# Patient Record
Sex: MALE | Race: WHITE | Employment: OTHER | URBAN - METROPOLITAN AREA
[De-identification: names, ages, dates, MRNs, and addresses within clinical notes are randomized per-mention and may not be internally consistent; named-entity substitution may affect disease eponyms.]

---

## 2023-08-16 ENCOUNTER — APPOINTMENT (OUTPATIENT)
Dept: GENERAL RADIOLOGY | Facility: HOSPITAL | Age: 73
End: 2023-08-16
Attending: EMERGENCY MEDICINE
Payer: MEDICARE

## 2023-08-16 ENCOUNTER — HOSPITAL ENCOUNTER (INPATIENT)
Facility: HOSPITAL | Age: 73
LOS: 9 days | Discharge: NURSING, REHAB OR LONG-TERM CARE | End: 2023-08-26
Attending: EMERGENCY MEDICINE | Admitting: INTERNAL MEDICINE
Payer: MEDICARE

## 2023-08-16 ENCOUNTER — APPOINTMENT (OUTPATIENT)
Dept: MRI IMAGING | Facility: HOSPITAL | Age: 73
End: 2023-08-16
Attending: EMERGENCY MEDICINE
Payer: MEDICARE

## 2023-08-16 DIAGNOSIS — Z98.890 S/P CRANIOTOMY: ICD-10-CM

## 2023-08-16 DIAGNOSIS — I62.03 CHRONIC SUBDURAL HEMATOMA (HCC): Primary | ICD-10-CM

## 2023-08-16 LAB
BASOPHILS # BLD AUTO: 0.04 X10(3) UL (ref 0–0.2)
BASOPHILS NFR BLD AUTO: 0.7 %
DEPRECATED RDW RBC AUTO: 55.2 FL (ref 35.1–46.3)
EOSINOPHIL # BLD AUTO: 0.28 X10(3) UL (ref 0–0.7)
EOSINOPHIL NFR BLD AUTO: 4.6 %
ERYTHROCYTE [DISTWIDTH] IN BLOOD BY AUTOMATED COUNT: 16 % (ref 11–15)
HCT VFR BLD AUTO: 27.5 %
HGB BLD-MCNC: 8.8 G/DL
IMM GRANULOCYTES # BLD AUTO: 0.06 X10(3) UL (ref 0–1)
IMM GRANULOCYTES NFR BLD: 1 %
LYMPHOCYTES # BLD AUTO: 1.79 X10(3) UL (ref 1–4)
LYMPHOCYTES NFR BLD AUTO: 29.2 %
MCH RBC QN AUTO: 30.6 PG (ref 26–34)
MCHC RBC AUTO-ENTMCNC: 32 G/DL (ref 31–37)
MCV RBC AUTO: 95.5 FL
MONOCYTES # BLD AUTO: 0.49 X10(3) UL (ref 0.1–1)
MONOCYTES NFR BLD AUTO: 8 %
NEUTROPHILS # BLD AUTO: 3.47 X10 (3) UL (ref 1.5–7.7)
NEUTROPHILS # BLD AUTO: 3.47 X10(3) UL (ref 1.5–7.7)
NEUTROPHILS NFR BLD AUTO: 56.5 %
PLATELET # BLD AUTO: 261 10(3)UL (ref 150–450)
RBC # BLD AUTO: 2.88 X10(6)UL
WBC # BLD AUTO: 6.1 X10(3) UL (ref 4–11)

## 2023-08-16 PROCEDURE — 71045 X-RAY EXAM CHEST 1 VIEW: CPT | Performed by: EMERGENCY MEDICINE

## 2023-08-16 PROCEDURE — 70551 MRI BRAIN STEM W/O DYE: CPT | Performed by: EMERGENCY MEDICINE

## 2023-08-17 ENCOUNTER — ANESTHESIA (OUTPATIENT)
Dept: SURGERY | Facility: HOSPITAL | Age: 73
End: 2023-08-17
Payer: MEDICARE

## 2023-08-17 ENCOUNTER — ANESTHESIA EVENT (OUTPATIENT)
Dept: SURGERY | Facility: HOSPITAL | Age: 73
End: 2023-08-17
Payer: MEDICARE

## 2023-08-17 ENCOUNTER — APPOINTMENT (OUTPATIENT)
Dept: CT IMAGING | Facility: HOSPITAL | Age: 73
End: 2023-08-17
Attending: EMERGENCY MEDICINE
Payer: MEDICARE

## 2023-08-17 PROBLEM — N18.4 ANEMIA IN STAGE 4 CHRONIC KIDNEY DISEASE  (HCC): Status: ACTIVE | Noted: 2023-08-17

## 2023-08-17 PROBLEM — I62.03 CHRONIC SUBDURAL HEMATOMA (HCC): Status: ACTIVE | Noted: 2023-08-17

## 2023-08-17 PROBLEM — D63.1 ANEMIA IN STAGE 4 CHRONIC KIDNEY DISEASE: Status: ACTIVE | Noted: 2023-08-17

## 2023-08-17 PROBLEM — N18.4 CKD (CHRONIC KIDNEY DISEASE) STAGE 4, GFR 15-29 ML/MIN (HCC): Status: ACTIVE | Noted: 2023-08-17

## 2023-08-17 PROBLEM — N18.4 ANEMIA IN STAGE 4 CHRONIC KIDNEY DISEASE (HCC): Status: ACTIVE | Noted: 2023-08-17

## 2023-08-17 PROBLEM — D63.1 ANEMIA IN STAGE 4 CHRONIC KIDNEY DISEASE  (HCC): Status: ACTIVE | Noted: 2023-08-17

## 2023-08-17 PROBLEM — N18.4 ANEMIA IN STAGE 4 CHRONIC KIDNEY DISEASE: Status: ACTIVE | Noted: 2023-08-17

## 2023-08-17 PROBLEM — D63.1 ANEMIA IN STAGE 4 CHRONIC KIDNEY DISEASE (HCC): Status: ACTIVE | Noted: 2023-08-17

## 2023-08-17 LAB
ALBUMIN SERPL-MCNC: 3 G/DL (ref 3.4–5)
ALP LIVER SERPL-CCNC: 85 U/L
ALT SERPL-CCNC: 24 U/L
AMMONIA PLAS-MCNC: 47 UMOL/L (ref 11–32)
ANION GAP SERPL CALC-SCNC: 10 MMOL/L (ref 0–18)
ANION GAP SERPL CALC-SCNC: 6 MMOL/L (ref 0–18)
ANTIBODY SCREEN: NEGATIVE
APTT PPP: 29.9 SECONDS (ref 23.3–35.6)
AST SERPL-CCNC: 29 U/L (ref 15–37)
ATRIAL RATE: 71 BPM
BASE EXCESS BLD CALC-SCNC: -0.7 MMOL/L (ref ?–2)
BASOPHILS # BLD AUTO: 0.05 X10(3) UL (ref 0–0.2)
BASOPHILS NFR BLD AUTO: 1 %
BILIRUB DIRECT SERPL-MCNC: <0.1 MG/DL (ref 0–0.2)
BILIRUB SERPL-MCNC: 0.5 MG/DL (ref 0.1–2)
BUN BLD-MCNC: 53 MG/DL (ref 7–18)
BUN BLD-MCNC: 53 MG/DL (ref 7–18)
BUN/CREAT SERPL: 15.5 (ref 10–20)
BUN/CREAT SERPL: 16.7 (ref 10–20)
CA-I BLD-SCNC: 1.08 MMOL/L (ref 0.95–1.32)
CALCIUM BLD-MCNC: 7.7 MG/DL (ref 8.5–10.1)
CALCIUM BLD-MCNC: 8.2 MG/DL (ref 8.5–10.1)
CHLORIDE SERPL-SCNC: 108 MMOL/L (ref 98–112)
CHLORIDE SERPL-SCNC: 113 MMOL/L (ref 98–112)
CO2 SERPL-SCNC: 23 MMOL/L (ref 21–32)
CO2 SERPL-SCNC: 26 MMOL/L (ref 21–32)
CREAT BLD-MCNC: 3.17 MG/DL
CREAT BLD-MCNC: 3.42 MG/DL
DEPRECATED RDW RBC AUTO: 55.1 FL (ref 35.1–46.3)
EGFRCR SERPLBLD CKD-EPI 2021: 18 ML/MIN/1.73M2 (ref 60–?)
EGFRCR SERPLBLD CKD-EPI 2021: 20 ML/MIN/1.73M2 (ref 60–?)
EOSINOPHIL # BLD AUTO: 0.29 X10(3) UL (ref 0–0.7)
EOSINOPHIL NFR BLD AUTO: 5.8 %
ERYTHROCYTE [DISTWIDTH] IN BLOOD BY AUTOMATED COUNT: 15.8 % (ref 11–15)
EST. AVERAGE GLUCOSE BLD GHB EST-MCNC: 166 MG/DL (ref 68–126)
GLUCOSE BLD-MCNC: 127 MG/DL (ref 70–99)
GLUCOSE BLD-MCNC: 241 MG/DL (ref 70–99)
GLUCOSE BLDC GLUCOMTR-MCNC: 100 MG/DL (ref 70–99)
GLUCOSE BLDC GLUCOMTR-MCNC: 122 MG/DL (ref 70–99)
GLUCOSE BLDC GLUCOMTR-MCNC: 124 MG/DL (ref 70–99)
GLUCOSE BLDC GLUCOMTR-MCNC: 159 MG/DL (ref 70–99)
GLUCOSE BLDC GLUCOMTR-MCNC: 217 MG/DL (ref 70–99)
GLUCOSE BLDC GLUCOMTR-MCNC: 99 MG/DL (ref 70–99)
HBA1C MFR BLD: 7.4 % (ref ?–5.7)
HCO3 BLDA-SCNC: 24.4 MEQ/L (ref 21–27)
HCT VFR BLD AUTO: 23.6 %
HGB BLD-MCNC: 6.8 G/DL
HGB BLD-MCNC: 7.5 G/DL
IMM GRANULOCYTES # BLD AUTO: 0.04 X10(3) UL (ref 0–1)
IMM GRANULOCYTES NFR BLD: 0.8 %
INR BLD: 0.99 (ref 0.85–1.16)
IRON SATN MFR SERPL: 19 %
IRON SERPL-MCNC: 43 UG/DL
LACTATE BLD-SCNC: 0.9 MMOL/L (ref 0.5–2)
LYMPHOCYTES # BLD AUTO: 1.65 X10(3) UL (ref 1–4)
LYMPHOCYTES NFR BLD AUTO: 33.1 %
MAGNESIUM SERPL-MCNC: 2.1 MG/DL (ref 1.6–2.6)
MCH RBC QN AUTO: 30.7 PG (ref 26–34)
MCHC RBC AUTO-ENTMCNC: 31.8 G/DL (ref 31–37)
MCV RBC AUTO: 96.7 FL
MONOCYTES # BLD AUTO: 0.51 X10(3) UL (ref 0.1–1)
MONOCYTES NFR BLD AUTO: 10.2 %
NEUTROPHILS # BLD AUTO: 2.45 X10 (3) UL (ref 1.5–7.7)
NEUTROPHILS # BLD AUTO: 2.45 X10(3) UL (ref 1.5–7.7)
NEUTROPHILS NFR BLD AUTO: 49.1 %
NT-PROBNP SERPL-MCNC: 1606 PG/ML (ref ?–125)
OSMOLALITY SERPL CALC.SUM OF ELEC: 314 MOSM/KG (ref 275–295)
OSMOLALITY SERPL CALC.SUM OF ELEC: 316 MOSM/KG (ref 275–295)
P AXIS: 62 DEGREES
P-R INTERVAL: 178 MS
PCO2 BLDA: 40 MM HG (ref 35–45)
PH BLDA: 7.39 [PH] (ref 7.35–7.45)
PLATELET # BLD AUTO: 214 10(3)UL (ref 150–450)
PO2 BLDA: 248 MM HG (ref 80–100)
POTASSIUM BLD-SCNC: 3.8 MMOL/L (ref 3.6–5.1)
POTASSIUM SERPL-SCNC: 3.8 MMOL/L (ref 3.5–5.1)
POTASSIUM SERPL-SCNC: 4.6 MMOL/L (ref 3.5–5.1)
PROT SERPL-MCNC: 7.3 G/DL (ref 6.4–8.2)
PROTHROMBIN TIME: 13 SECONDS (ref 11.6–14.8)
PUNCTURE CHARGE: NO
Q-T INTERVAL: 404 MS
QRS DURATION: 86 MS
QTC CALCULATION (BEZET): 439 MS
R AXIS: 8 DEGREES
RBC # BLD AUTO: 2.44 X10(6)UL
RH BLOOD TYPE: POSITIVE
RH BLOOD TYPE: POSITIVE
SODIUM BLD-SCNC: 140 MMOL/L (ref 135–145)
SODIUM SERPL-SCNC: 141 MMOL/L (ref 136–145)
SODIUM SERPL-SCNC: 145 MMOL/L (ref 136–145)
T AXIS: 36 DEGREES
TIBC SERPL-MCNC: 225 UG/DL (ref 240–450)
TRANSFERRIN SERPL-MCNC: 151 MG/DL (ref 200–360)
TROPONIN I HIGH SENSITIVITY: 32 NG/L
VALPROATE SERPL-MCNC: 85.6 UG/ML (ref 50–100)
VENTRICULAR RATE: 71 BPM
WBC # BLD AUTO: 5 X10(3) UL (ref 4–11)

## 2023-08-17 PROCEDURE — 36410 VNPNXR 3YR/> PHY/QHP DX/THER: CPT | Performed by: NURSE PRACTITIONER

## 2023-08-17 PROCEDURE — 76937 US GUIDE VASCULAR ACCESS: CPT | Performed by: NURSE PRACTITIONER

## 2023-08-17 PROCEDURE — 009400Z DRAINAGE OF INTRACRANIAL SUBDURAL SPACE WITH DRAINAGE DEVICE, OPEN APPROACH: ICD-10-PCS | Performed by: NEUROLOGICAL SURGERY

## 2023-08-17 PROCEDURE — 70450 CT HEAD/BRAIN W/O DYE: CPT | Performed by: EMERGENCY MEDICINE

## 2023-08-17 PROCEDURE — 99222 1ST HOSP IP/OBS MODERATE 55: CPT | Performed by: STUDENT IN AN ORGANIZED HEALTH CARE EDUCATION/TRAINING PROGRAM

## 2023-08-17 PROCEDURE — 99222 1ST HOSP IP/OBS MODERATE 55: CPT | Performed by: INTERNAL MEDICINE

## 2023-08-17 PROCEDURE — 36430 TRANSFUSION BLD/BLD COMPNT: CPT | Performed by: ANESTHESIOLOGY

## 2023-08-17 PROCEDURE — 99223 1ST HOSP IP/OBS HIGH 75: CPT | Performed by: INTERNAL MEDICINE

## 2023-08-17 PROCEDURE — 76942 ECHO GUIDE FOR BIOPSY: CPT | Performed by: ANESTHESIOLOGY

## 2023-08-17 RX ORDER — HYDROMORPHONE HYDROCHLORIDE 1 MG/ML
0.6 INJECTION, SOLUTION INTRAMUSCULAR; INTRAVENOUS; SUBCUTANEOUS EVERY 5 MIN PRN
Status: ACTIVE | OUTPATIENT
Start: 2023-08-17 | End: 2023-08-17

## 2023-08-17 RX ORDER — BISACODYL 10 MG
10 SUPPOSITORY, RECTAL RECTAL
Status: DISCONTINUED | OUTPATIENT
Start: 2023-08-17 | End: 2023-08-17 | Stop reason: SDUPTHER

## 2023-08-17 RX ORDER — LIDOCAINE HYDROCHLORIDE 10 MG/ML
INJECTION, SOLUTION EPIDURAL; INFILTRATION; INTRACAUDAL; PERINEURAL AS NEEDED
Status: DISCONTINUED | OUTPATIENT
Start: 2023-08-17 | End: 2023-08-17 | Stop reason: SURG

## 2023-08-17 RX ORDER — SODIUM CHLORIDE, SODIUM LACTATE, POTASSIUM CHLORIDE, CALCIUM CHLORIDE 600; 310; 30; 20 MG/100ML; MG/100ML; MG/100ML; MG/100ML
INJECTION, SOLUTION INTRAVENOUS CONTINUOUS PRN
Status: DISCONTINUED | OUTPATIENT
Start: 2023-08-17 | End: 2023-08-17 | Stop reason: SURG

## 2023-08-17 RX ORDER — ONDANSETRON 2 MG/ML
INJECTION INTRAMUSCULAR; INTRAVENOUS AS NEEDED
Status: DISCONTINUED | OUTPATIENT
Start: 2023-08-17 | End: 2023-08-17 | Stop reason: SURG

## 2023-08-17 RX ORDER — OXYCODONE HYDROCHLORIDE 5 MG/1
2.5 TABLET ORAL EVERY 4 HOURS PRN
Status: DISCONTINUED | OUTPATIENT
Start: 2023-08-17 | End: 2023-08-26

## 2023-08-17 RX ORDER — PHENYLEPHRINE HCL 10 MG/ML
VIAL (ML) INJECTION AS NEEDED
Status: DISCONTINUED | OUTPATIENT
Start: 2023-08-17 | End: 2023-08-17 | Stop reason: SURG

## 2023-08-17 RX ORDER — SENNOSIDES 8.6 MG
17.2 TABLET ORAL NIGHTLY PRN
Status: DISCONTINUED | OUTPATIENT
Start: 2023-08-17 | End: 2023-08-17

## 2023-08-17 RX ORDER — MORPHINE SULFATE 4 MG/ML
4 INJECTION, SOLUTION INTRAMUSCULAR; INTRAVENOUS EVERY 10 MIN PRN
Status: ACTIVE | OUTPATIENT
Start: 2023-08-17 | End: 2023-08-17

## 2023-08-17 RX ORDER — SODIUM CHLORIDE 9 MG/ML
INJECTION, SOLUTION INTRAVENOUS CONTINUOUS PRN
Status: DISCONTINUED | OUTPATIENT
Start: 2023-08-17 | End: 2023-08-17 | Stop reason: SURG

## 2023-08-17 RX ORDER — ONDANSETRON 2 MG/ML
4 INJECTION INTRAMUSCULAR; INTRAVENOUS EVERY 6 HOURS PRN
Status: DISCONTINUED | OUTPATIENT
Start: 2023-08-17 | End: 2023-08-26

## 2023-08-17 RX ORDER — DEXTROSE MONOHYDRATE 25 G/50ML
50 INJECTION, SOLUTION INTRAVENOUS
Status: DISCONTINUED | OUTPATIENT
Start: 2023-08-17 | End: 2023-08-26

## 2023-08-17 RX ORDER — BISACODYL 10 MG
10 SUPPOSITORY, RECTAL RECTAL
Status: DISCONTINUED | OUTPATIENT
Start: 2023-08-17 | End: 2023-08-26

## 2023-08-17 RX ORDER — NALOXONE HYDROCHLORIDE 0.4 MG/ML
80 INJECTION, SOLUTION INTRAMUSCULAR; INTRAVENOUS; SUBCUTANEOUS AS NEEDED
Status: DISCONTINUED | OUTPATIENT
Start: 2023-08-17 | End: 2023-08-18

## 2023-08-17 RX ORDER — HYDROMORPHONE HYDROCHLORIDE 1 MG/ML
0.4 INJECTION, SOLUTION INTRAMUSCULAR; INTRAVENOUS; SUBCUTANEOUS EVERY 5 MIN PRN
Status: DISCONTINUED | OUTPATIENT
Start: 2023-08-17 | End: 2023-08-23 | Stop reason: ALTCHOICE

## 2023-08-17 RX ORDER — PROCHLORPERAZINE EDISYLATE 5 MG/ML
5 INJECTION INTRAMUSCULAR; INTRAVENOUS ONCE AS NEEDED
Status: ACTIVE | OUTPATIENT
Start: 2023-08-17 | End: 2023-08-17

## 2023-08-17 RX ORDER — INSULIN GLARGINE 100 [IU]/ML
INJECTION, SOLUTION SUBCUTANEOUS NIGHTLY
COMMUNITY
End: 2023-08-26

## 2023-08-17 RX ORDER — HYDROMORPHONE HYDROCHLORIDE 1 MG/ML
0.4 INJECTION, SOLUTION INTRAMUSCULAR; INTRAVENOUS; SUBCUTANEOUS EVERY 2 HOUR PRN
Status: DISCONTINUED | OUTPATIENT
Start: 2023-08-17 | End: 2023-08-26

## 2023-08-17 RX ORDER — POLYETHYLENE GLYCOL 3350 17 G/17G
17 POWDER, FOR SOLUTION ORAL DAILY PRN
Status: DISCONTINUED | OUTPATIENT
Start: 2023-08-17 | End: 2023-08-23 | Stop reason: ALTCHOICE

## 2023-08-17 RX ORDER — SODIUM CHLORIDE, SODIUM LACTATE, POTASSIUM CHLORIDE, CALCIUM CHLORIDE 600; 310; 30; 20 MG/100ML; MG/100ML; MG/100ML; MG/100ML
INJECTION, SOLUTION INTRAVENOUS CONTINUOUS
Status: DISCONTINUED | OUTPATIENT
Start: 2023-08-17 | End: 2023-08-18

## 2023-08-17 RX ORDER — HALOPERIDOL 5 MG/ML
0.25 INJECTION INTRAMUSCULAR ONCE AS NEEDED
Status: ACTIVE | OUTPATIENT
Start: 2023-08-17 | End: 2023-08-17

## 2023-08-17 RX ORDER — ROCURONIUM BROMIDE 10 MG/ML
INJECTION, SOLUTION INTRAVENOUS AS NEEDED
Status: DISCONTINUED | OUTPATIENT
Start: 2023-08-17 | End: 2023-08-17 | Stop reason: SURG

## 2023-08-17 RX ORDER — GLYCOPYRROLATE 0.2 MG/ML
INJECTION, SOLUTION INTRAMUSCULAR; INTRAVENOUS AS NEEDED
Status: DISCONTINUED | OUTPATIENT
Start: 2023-08-17 | End: 2023-08-17 | Stop reason: SURG

## 2023-08-17 RX ORDER — LEVETIRACETAM 500 MG/5ML
500 INJECTION, SOLUTION, CONCENTRATE INTRAVENOUS EVERY 12 HOURS
Status: DISCONTINUED | OUTPATIENT
Start: 2023-08-17 | End: 2023-08-23

## 2023-08-17 RX ORDER — LIDOCAINE HYDROCHLORIDE AND EPINEPHRINE 10; 10 MG/ML; UG/ML
INJECTION, SOLUTION INFILTRATION; PERINEURAL AS NEEDED
Status: DISCONTINUED | OUTPATIENT
Start: 2023-08-17 | End: 2023-08-17 | Stop reason: HOSPADM

## 2023-08-17 RX ORDER — NICOTINE POLACRILEX 4 MG
30 LOZENGE BUCCAL
Status: DISCONTINUED | OUTPATIENT
Start: 2023-08-17 | End: 2023-08-26

## 2023-08-17 RX ORDER — NALOXONE HYDROCHLORIDE 0.4 MG/ML
80 INJECTION, SOLUTION INTRAMUSCULAR; INTRAVENOUS; SUBCUTANEOUS AS NEEDED
Status: DISCONTINUED | OUTPATIENT
Start: 2023-08-17 | End: 2023-08-17

## 2023-08-17 RX ORDER — SENNOSIDES 8.6 MG
17.2 TABLET ORAL NIGHTLY PRN
Status: DISCONTINUED | OUTPATIENT
Start: 2023-08-17 | End: 2023-08-26

## 2023-08-17 RX ORDER — FERROUS SULFATE TAB EC 324 MG (65 MG FE EQUIVALENT) 324 (65 FE) MG
1 TABLET DELAYED RESPONSE ORAL DAILY
COMMUNITY

## 2023-08-17 RX ORDER — NICOTINE POLACRILEX 4 MG
15 LOZENGE BUCCAL
Status: DISCONTINUED | OUTPATIENT
Start: 2023-08-17 | End: 2023-08-26

## 2023-08-17 RX ORDER — POLYETHYLENE GLYCOL 3350 17 G/17G
17 POWDER, FOR SOLUTION ORAL DAILY PRN
Status: DISCONTINUED | OUTPATIENT
Start: 2023-08-17 | End: 2023-08-26

## 2023-08-17 RX ORDER — HYDROMORPHONE HYDROCHLORIDE 1 MG/ML
0.2 INJECTION, SOLUTION INTRAMUSCULAR; INTRAVENOUS; SUBCUTANEOUS EVERY 5 MIN PRN
Status: ACTIVE | OUTPATIENT
Start: 2023-08-17 | End: 2023-08-17

## 2023-08-17 RX ORDER — SODIUM CHLORIDE 9 MG/ML
INJECTION, SOLUTION INTRAVENOUS CONTINUOUS
Status: DISCONTINUED | OUTPATIENT
Start: 2023-08-17 | End: 2023-08-18

## 2023-08-17 RX ORDER — MORPHINE SULFATE 2 MG/ML
2 INJECTION, SOLUTION INTRAMUSCULAR; INTRAVENOUS EVERY 10 MIN PRN
Status: ACTIVE | OUTPATIENT
Start: 2023-08-17 | End: 2023-08-17

## 2023-08-17 RX ORDER — HYDROMORPHONE HYDROCHLORIDE 1 MG/ML
0.2 INJECTION, SOLUTION INTRAMUSCULAR; INTRAVENOUS; SUBCUTANEOUS EVERY 5 MIN PRN
Status: DISCONTINUED | OUTPATIENT
Start: 2023-08-17 | End: 2023-08-23 | Stop reason: ALTCHOICE

## 2023-08-17 RX ORDER — HYDROMORPHONE HYDROCHLORIDE 1 MG/ML
0.4 INJECTION, SOLUTION INTRAMUSCULAR; INTRAVENOUS; SUBCUTANEOUS EVERY 5 MIN PRN
Status: ACTIVE | OUTPATIENT
Start: 2023-08-17 | End: 2023-08-17

## 2023-08-17 RX ORDER — LEVOCARNITINE 330 MG/1
330 TABLET ORAL 3 TIMES DAILY
COMMUNITY
End: 2023-08-26

## 2023-08-17 RX ORDER — LABETALOL HYDROCHLORIDE 5 MG/ML
10 INJECTION, SOLUTION INTRAVENOUS AS NEEDED
Status: DISCONTINUED | OUTPATIENT
Start: 2023-08-17 | End: 2023-08-26

## 2023-08-17 RX ORDER — CEFAZOLIN SODIUM/WATER 2 G/20 ML
SYRINGE (ML) INTRAVENOUS AS NEEDED
Status: DISCONTINUED | OUTPATIENT
Start: 2023-08-17 | End: 2023-08-17 | Stop reason: SURG

## 2023-08-17 RX ORDER — POTASSIUM CHLORIDE 14.9 MG/ML
20 INJECTION INTRAVENOUS ONCE
Status: COMPLETED | OUTPATIENT
Start: 2023-08-17 | End: 2023-08-17

## 2023-08-17 RX ORDER — MELATONIN
3 NIGHTLY PRN
Status: DISCONTINUED | OUTPATIENT
Start: 2023-08-17 | End: 2023-08-26

## 2023-08-17 RX ORDER — MORPHINE SULFATE 4 MG/ML
4 INJECTION, SOLUTION INTRAMUSCULAR; INTRAVENOUS EVERY 10 MIN PRN
Status: DISCONTINUED | OUTPATIENT
Start: 2023-08-17 | End: 2023-08-23 | Stop reason: ALTCHOICE

## 2023-08-17 RX ORDER — SENNOSIDES 8.6 MG
8.6 TABLET ORAL 2 TIMES DAILY
COMMUNITY

## 2023-08-17 RX ORDER — HYDROMORPHONE HYDROCHLORIDE 1 MG/ML
0.6 INJECTION, SOLUTION INTRAMUSCULAR; INTRAVENOUS; SUBCUTANEOUS EVERY 5 MIN PRN
Status: DISCONTINUED | OUTPATIENT
Start: 2023-08-17 | End: 2023-08-23 | Stop reason: ALTCHOICE

## 2023-08-17 RX ORDER — MORPHINE SULFATE 2 MG/ML
2 INJECTION, SOLUTION INTRAMUSCULAR; INTRAVENOUS EVERY 10 MIN PRN
Status: DISCONTINUED | OUTPATIENT
Start: 2023-08-17 | End: 2023-08-23 | Stop reason: ALTCHOICE

## 2023-08-17 RX ORDER — DIVALPROEX SODIUM 500 MG/1
500 TABLET, DELAYED RELEASE ORAL 3 TIMES DAILY
COMMUNITY

## 2023-08-17 RX ORDER — ERGOCALCIFEROL 1.25 MG/1
1 CAPSULE ORAL WEEKLY
COMMUNITY
End: 2023-08-26

## 2023-08-17 RX ORDER — ONDANSETRON 2 MG/ML
4 INJECTION INTRAMUSCULAR; INTRAVENOUS EVERY 6 HOURS PRN
Status: DISCONTINUED | OUTPATIENT
Start: 2023-08-17 | End: 2023-08-17 | Stop reason: SDUPTHER

## 2023-08-17 RX ORDER — METOCLOPRAMIDE HYDROCHLORIDE 5 MG/ML
5 INJECTION INTRAMUSCULAR; INTRAVENOUS EVERY 8 HOURS PRN
Status: DISCONTINUED | OUTPATIENT
Start: 2023-08-17 | End: 2023-08-26

## 2023-08-17 RX ORDER — ACETAMINOPHEN 500 MG
500 TABLET ORAL EVERY 4 HOURS PRN
Status: DISCONTINUED | OUTPATIENT
Start: 2023-08-17 | End: 2023-08-26

## 2023-08-17 RX ORDER — BENZONATATE 100 MG/1
200 CAPSULE ORAL 3 TIMES DAILY PRN
Status: DISCONTINUED | OUTPATIENT
Start: 2023-08-17 | End: 2023-08-26

## 2023-08-17 RX ORDER — ONDANSETRON 2 MG/ML
4 INJECTION INTRAMUSCULAR; INTRAVENOUS ONCE AS NEEDED
Status: ACTIVE | OUTPATIENT
Start: 2023-08-17 | End: 2023-08-17

## 2023-08-17 RX ORDER — MORPHINE SULFATE 10 MG/ML
6 INJECTION, SOLUTION INTRAMUSCULAR; INTRAVENOUS EVERY 10 MIN PRN
Status: DISCONTINUED | OUTPATIENT
Start: 2023-08-17 | End: 2023-08-23 | Stop reason: ALTCHOICE

## 2023-08-17 RX ORDER — OXYCODONE HYDROCHLORIDE 5 MG/1
5 TABLET ORAL EVERY 4 HOURS PRN
Status: DISCONTINUED | OUTPATIENT
Start: 2023-08-17 | End: 2023-08-26

## 2023-08-17 RX ORDER — FUROSEMIDE 40 MG/1
40 TABLET ORAL DAILY
COMMUNITY
End: 2023-08-26

## 2023-08-17 RX ORDER — EPHEDRINE SULFATE 50 MG/ML
INJECTION, SOLUTION INTRAVENOUS AS NEEDED
Status: DISCONTINUED | OUTPATIENT
Start: 2023-08-17 | End: 2023-08-17 | Stop reason: SURG

## 2023-08-17 RX ORDER — LIDOCAINE HYDROCHLORIDE 10 MG/ML
INJECTION, SOLUTION INFILTRATION; PERINEURAL
Status: COMPLETED | OUTPATIENT
Start: 2023-08-17 | End: 2023-08-17

## 2023-08-17 RX ORDER — HYDROMORPHONE HYDROCHLORIDE 1 MG/ML
0.2 INJECTION, SOLUTION INTRAMUSCULAR; INTRAVENOUS; SUBCUTANEOUS EVERY 2 HOUR PRN
Status: DISCONTINUED | OUTPATIENT
Start: 2023-08-17 | End: 2023-08-26

## 2023-08-17 RX ORDER — CALCITRIOL 0.25 UG/1
0.25 CAPSULE, LIQUID FILLED ORAL
COMMUNITY

## 2023-08-17 RX ORDER — MORPHINE SULFATE 4 MG/ML
6 INJECTION, SOLUTION INTRAMUSCULAR; INTRAVENOUS EVERY 10 MIN PRN
Status: ACTIVE | OUTPATIENT
Start: 2023-08-17 | End: 2023-08-17

## 2023-08-17 RX ADMIN — LIDOCAINE HYDROCHLORIDE 1 ML: 10 INJECTION, SOLUTION INFILTRATION; PERINEURAL at 14:25:00

## 2023-08-17 RX ADMIN — PHENYLEPHRINE HCL 100 MCG: 10 MG/ML VIAL (ML) INJECTION at 14:49:00

## 2023-08-17 RX ADMIN — ONDANSETRON 4 MG: 2 INJECTION INTRAMUSCULAR; INTRAVENOUS at 14:20:00

## 2023-08-17 RX ADMIN — PHENYLEPHRINE HCL 100 MCG: 10 MG/ML VIAL (ML) INJECTION at 15:08:00

## 2023-08-17 RX ADMIN — GLYCOPYRROLATE 0.2 MG: 0.2 INJECTION, SOLUTION INTRAMUSCULAR; INTRAVENOUS at 14:20:00

## 2023-08-17 RX ADMIN — EPHEDRINE SULFATE 5 MG: 50 INJECTION, SOLUTION INTRAVENOUS at 15:16:00

## 2023-08-17 RX ADMIN — ROCURONIUM BROMIDE 50 MG: 10 INJECTION, SOLUTION INTRAVENOUS at 14:27:00

## 2023-08-17 RX ADMIN — PHENYLEPHRINE HCL 100 MCG: 10 MG/ML VIAL (ML) INJECTION at 15:16:00

## 2023-08-17 RX ADMIN — SODIUM CHLORIDE: 9 INJECTION, SOLUTION INTRAVENOUS at 14:20:00

## 2023-08-17 RX ADMIN — LIDOCAINE HYDROCHLORIDE 50 MG: 10 INJECTION, SOLUTION EPIDURAL; INFILTRATION; INTRACAUDAL; PERINEURAL at 14:20:00

## 2023-08-17 RX ADMIN — SODIUM CHLORIDE, SODIUM LACTATE, POTASSIUM CHLORIDE, CALCIUM CHLORIDE: 600; 310; 30; 20 INJECTION, SOLUTION INTRAVENOUS at 14:20:00

## 2023-08-17 RX ADMIN — SODIUM CHLORIDE: 9 INJECTION, SOLUTION INTRAVENOUS at 16:12:00

## 2023-08-17 RX ADMIN — SODIUM CHLORIDE, SODIUM LACTATE, POTASSIUM CHLORIDE, CALCIUM CHLORIDE: 600; 310; 30; 20 INJECTION, SOLUTION INTRAVENOUS at 16:12:00

## 2023-08-17 RX ADMIN — EPHEDRINE SULFATE 10 MG: 50 INJECTION, SOLUTION INTRAVENOUS at 14:40:00

## 2023-08-17 RX ADMIN — CEFAZOLIN SODIUM/WATER 2 G: 2 G/20 ML SYRINGE (ML) INTRAVENOUS at 14:23:00

## 2023-08-17 NOTE — BRIEF OP NOTE
Pre-Operative Diagnosis: Subdural hematoma ([address] [S06. 5XAA]     Post-Operative Diagnosis: Subdural hematoma ([address] [S06. 5XAA]      Procedure Performed:   BILATERAL CRANIOTOMY, SUBDURAL HEMATOMA    Surgeon(s) and Role:     Zurdo Sexton MD - Primary    Assistant(s):  PA: Sabrina Garcia PA-C     Surgical Findings: see op report      Specimen: none     Estimated Blood Loss: 50 cc    Patient extubated in OR. Brought to ICU. Responding to questions and commands appropriately. Neurologic exam stable compared to preop.     Dorothy Lord PA-C  Physician Assistant- Neurosurgery   [employer]  8/17/2023, 4:18 PM

## 2023-08-17 NOTE — ED INITIAL ASSESSMENT (HPI)
Patient, with a hx of TBI in June 2023, brought in by family for concerns of tremors, R sided weakness that began today around 1230, tremors and confusion. Family states patient is at rehab three days a week, and states patient has been improving, but today was worst of his symptoms. Patient also experiencing generalizing edema on body, takes lasix daily. Denies CP/SOB.

## 2023-08-17 NOTE — ANESTHESIA PROCEDURE NOTES
Arterial Line    Date/Time: 8/17/2023 2:25 PM    Performed by: Barbara Clark MD  Authorized by: Barbara Clark MD    General Information and Staff    Procedure Start:  8/17/2023 2:25 PM  Procedure End:  8/17/2023 2:28 PM  Anesthesiologist:  Barbara Clark MD  Performed By:  Anesthesiologist  Patient Location:  OR  Indication: continuous blood pressure monitoring and blood sampling needed    Site Identification: real time ultrasound guided and surface landmarks    Preanesthetic Checklist: 2 patient identifiers, IV checked, risks and benefits discussed, monitors and equipment checked, pre-op evaluation, timeout performed, anesthesia consent and sterile technique used    Procedure Details    Catheter Size:  20 G  Catheter Length:  1 and 3/4 inch  Catheter Type:  Arrow  Seldinger Technique?: Yes    Laterality:  Left  Site:  Radial artery  Site Prep: chlorhexidine    Line Secured:  Wrist Brace, tape and Tegaderm    Assessment    Events: patient tolerated procedure well with no complications      Medications  8/17/2023 2:25 PM  lidocaine injection 1% - Intradermal   1 mL - 8/17/2023 2:25:00 PM    Additional Comments    First pass with ultrasound

## 2023-08-17 NOTE — PHYSICAL THERAPY NOTE
Therapy orders received, chart reviewed. Discussed with OT/RN who reports pt to OR this date per neurosurgery. Will discharge, when cleared by neurosurgery for activity please place new orders and activity orders as indicated. Thank you.        Jorge He, PT

## 2023-08-17 NOTE — ED QUICK NOTES
Orders for admission, patient is aware of plan and ready to go upstairs. Any questions, please call ED RN Matthias Bird at extension [phone]. Patient Covid vaccination status: Unvaccinated     COVID Test Ordered in ED: None    COVID Suspicion at Admission: N/A    Running Infusions:  None    Mental Status/LOC at time of transport: A&Ox4    Other pertinent information: hx of strokes and seizures with mild residual R side weakness, high fall risk.   CIWA score: N/A   NIH score:  N/A

## 2023-08-17 NOTE — OCCUPATIONAL THERAPY NOTE
OT orders rcvd from the hospitalist; pt presents with chronic SDH- per discussion with RN- pt is likely going to OR; pending neurosurgery consult. Will need new orders from neurosurgery and updated activity guidelines if applicable. Will continue to follow peripherally. If patient does not go to OR, please place new orders for therapy consult.      Thank you,    Ruthy Estevez, Occupational Therapist, OTR/L ext [phone]

## 2023-08-17 NOTE — OPERATIVE REPORT
Neurosurgery operative report        Preoperative diagnosis:  Bilateral subdural hematomas with brain compression      Postoperative diagnosis:  Bilateral subacute on chronic subdural hematomas with brain compression      Procedure performed:  1. Left craniotomy for evacuation of subdural hematoma  2. Right craniotomy for evacuation of subdural hematoma          Surgeon: Angie Sierra MD        Assistant: Geralynn Hamman, PA-C        Anesthesia: General endotracheal        Estimated blood loss: 50 cc        Indications for procedure:    Please also see the relevant progress notes for further information. Briefly, this patient was admitted immediately prior to the operation. He had a history of fall with head injury back in July. He had been treated nonoperatively and transferred to acute inpatient rehabilitation. During the week prior to this operation, the patient developed ongoing worsening symptoms, including fatigue and weakness. Imaging work-up was undertaken. This demonstrated substantial bilateral subdural hematomas. He was counseled regarding available surgical and nonsurgical treatment options. Following this discussion, he chose to proceed with this operation. Procedure in detail:    The patient was reevaluated in the preoperative holding area. The patient was reexamined. The operation was reviewed, including risks, benefits, and alternatives. Informed consent was verified. The patient was then brought back to the operating theater. A timeout was performed per protocol. General endotracheal anesthesia was induced. Lines and monitors were started by the anesthesiologist.    The patient was placed in supine position on a standard operating table. Hair was removed with surgical clippers. The scalp was cleansed with isopropyl alcohol. Skin incisions were marked out. DuraPrep was now applied followed by standard sterile surgical draping.     Lidocaine with epinephrine was injected. Another timeout was performed per protocol. Bilateral skin incisions were made with a #10 scalpel blade. Retractors were placed. Bovie electrocautery was used to maintain hemostasis and incised paracranium. Bur holes were placed bilaterally at the posterior aspect of the exposed bone. Craniotome was then used to fashion bilateral craniotomies. A #15 scalpel blade was used to open the dura in a cruciform fashion bilaterally. This led to the brisk return of subacute blood followed by more chronic appearing blood on both sides. Copious irrigation was used to evacuate the subdural hematomas. Hemostasis was assured with bipolar electrocautery. FREDDY drains were now brought out through separate stab incisions and placed in the subdural space bilaterally. Dura was reapproximated with interrupted 4-0 Nurolon sutures. The bone flaps were prefabricated at the back table. The bone flaps were then affixed the patient's skull once again. The incisions were irrigated once again. Avis Vermilion was closed with interrupted inverted 3-0 Vicryl. Skin was closed with staples. The drains were sutured in position. A dressing was applied. Drapes removed. The patient was now returned to a neutral, supine position. Disposition: Critical care unit    Condition: Stable, extubated, and neurologically at baseline    Counts: All needle, sponge, and cottonoid counts were reported correct at the end of the operation. Complications: None    Wound classification: 1, clean    Implants: John cranial plating system    Specimen: None          This report was dictated using voice recognition technology. Errors in syntax or recognition may occur, and should not be construed to change the meaning of a sentence.

## 2023-08-17 NOTE — PLAN OF CARE
Pt arrived to unit , family updated on plan of care, neurochecks checked q2 throughout shift .        Problem: Patient Centered Care  Goal: Patient preferences are identified and integrated in the patient's plan of care  Description: Interventions:  - What would you like us to know as we care for you?   - Provide timely, complete, and accurate information to patient/family  - Incorporate patient and family knowledge, values, beliefs, and cultural backgrounds into the planning and delivery of care  - Encourage patient/family to participate in care and decision-making at the level they choose  - Honor patient and family perspectives and choices  Outcome: Progressing     Problem: Patient/Family Goals  Goal: Patient/Family Long Term Goal  Description: Patient's Long Term Goal:     Interventions:  -   - See additional Care Plan goals for specific interventions  Outcome: Progressing  Goal: Patient/Family Short Term Goal  Description: Patient's Short Term Goal:     Interventions:   -  - See additional Care Plan goals for specific interventions  Outcome: Progressing

## 2023-08-17 NOTE — ANESTHESIA PROCEDURE NOTES
Airway  Date/Time: 8/17/2023 2:30 PM  Urgency: elective      General Information and Staff    Patient location during procedure: OR  Anesthesiologist: Guru Longoria MD  Performed: anesthesiologist   Performed by: Guru Longoria MD  Authorized by: Guru Longoria MD      Indications and Patient Condition  Indications for airway management: anesthesia  Sedation level: deep  Preoxygenated: yes  Patient position: sniffing  Mask difficulty assessment: 2 - vent by mask + OA or adjuvant +/- NMBA    Final Airway Details  Final airway type: endotracheal airway      Successful airway: ETT  Cuffed: yes   Successful intubation technique: Video laryngoscopy  Facilitating devices/methods: intubating stylet  Endotracheal tube insertion site: oral  Blade: GlideScope  Blade size: #3  ETT size (mm): 7.0    Cormack-Lehane Classification: grade I - full view of glottis  Placement verified by: capnometry   Measured from: lips  ETT to lips (cm): 21  Number of attempts at approach: 1    Additional Comments  Atraumatic x1, soft bite block  Mask with 90 OA

## 2023-08-18 ENCOUNTER — APPOINTMENT (OUTPATIENT)
Dept: CT IMAGING | Facility: HOSPITAL | Age: 73
End: 2023-08-18
Attending: STUDENT IN AN ORGANIZED HEALTH CARE EDUCATION/TRAINING PROGRAM
Payer: MEDICARE

## 2023-08-18 ENCOUNTER — APPOINTMENT (OUTPATIENT)
Dept: GENERAL RADIOLOGY | Facility: HOSPITAL | Age: 73
End: 2023-08-18
Payer: MEDICARE

## 2023-08-18 LAB
ALBUMIN SERPL-MCNC: 2.5 G/DL (ref 3.4–5)
ALBUMIN/GLOB SERPL: 0.8 {RATIO} (ref 1–2)
ALP LIVER SERPL-CCNC: 49 U/L
ALT SERPL-CCNC: 14 U/L
ANION GAP SERPL CALC-SCNC: 8 MMOL/L (ref 0–18)
AST SERPL-CCNC: 16 U/L (ref 15–37)
BILIRUB SERPL-MCNC: 0.2 MG/DL (ref 0.1–2)
BLOOD TYPE BARCODE: 5100
BUN BLD-MCNC: 51 MG/DL (ref 7–18)
BUN/CREAT SERPL: 17.1 (ref 10–20)
CALCIUM BLD-MCNC: 7.3 MG/DL (ref 8.5–10.1)
CHLORIDE SERPL-SCNC: 114 MMOL/L (ref 98–112)
CO2 SERPL-SCNC: 20 MMOL/L (ref 21–32)
CREAT BLD-MCNC: 2.99 MG/DL
DEPRECATED RDW RBC AUTO: 54.8 FL (ref 35.1–46.3)
EGFRCR SERPLBLD CKD-EPI 2021: 21 ML/MIN/1.73M2 (ref 60–?)
ERYTHROCYTE [DISTWIDTH] IN BLOOD BY AUTOMATED COUNT: 16.7 % (ref 11–15)
GLOBULIN PLAS-MCNC: 3.2 G/DL (ref 2.8–4.4)
GLUCOSE BLD-MCNC: 236 MG/DL (ref 70–99)
GLUCOSE BLDC GLUCOMTR-MCNC: 189 MG/DL (ref 70–99)
GLUCOSE BLDC GLUCOMTR-MCNC: 228 MG/DL (ref 70–99)
GLUCOSE BLDC GLUCOMTR-MCNC: 238 MG/DL (ref 70–99)
GLUCOSE BLDC GLUCOMTR-MCNC: 264 MG/DL (ref 70–99)
HCT VFR BLD AUTO: 30.6 %
HGB BLD-MCNC: 9.8 G/DL
MCH RBC QN AUTO: 29.1 PG (ref 26–34)
MCHC RBC AUTO-ENTMCNC: 32 G/DL (ref 31–37)
MCV RBC AUTO: 90.8 FL
OSMOLALITY SERPL CALC.SUM OF ELEC: 315 MOSM/KG (ref 275–295)
PHOSPHATE SERPL-MCNC: 4.5 MG/DL (ref 2.5–4.9)
PLATELET # BLD AUTO: 187 10(3)UL (ref 150–450)
POTASSIUM SERPL-SCNC: 5 MMOL/L (ref 3.5–5.1)
POTASSIUM SERPL-SCNC: 5.1 MMOL/L (ref 3.5–5.1)
PROT SERPL-MCNC: 5.7 G/DL (ref 6.4–8.2)
PTH-INTACT SERPL-MCNC: 223.9 PG/ML (ref 18.5–88)
RBC # BLD AUTO: 3.37 X10(6)UL
SODIUM SERPL-SCNC: 142 MMOL/L (ref 136–145)
UNIT VOLUME: 350 ML
VIT D+METAB SERPL-MCNC: 77.1 NG/ML (ref 30–100)
WBC # BLD AUTO: 9.5 X10(3) UL (ref 4–11)

## 2023-08-18 PROCEDURE — 71045 X-RAY EXAM CHEST 1 VIEW: CPT

## 2023-08-18 PROCEDURE — 99233 SBSQ HOSP IP/OBS HIGH 50: CPT | Performed by: INTERNAL MEDICINE

## 2023-08-18 PROCEDURE — 99232 SBSQ HOSP IP/OBS MODERATE 35: CPT | Performed by: INTERNAL MEDICINE

## 2023-08-18 PROCEDURE — 70450 CT HEAD/BRAIN W/O DYE: CPT | Performed by: STUDENT IN AN ORGANIZED HEALTH CARE EDUCATION/TRAINING PROGRAM

## 2023-08-18 PROCEDURE — 99223 1ST HOSP IP/OBS HIGH 75: CPT | Performed by: INTERNAL MEDICINE

## 2023-08-18 RX ORDER — ACETAMINOPHEN 10 MG/ML
1000 INJECTION, SOLUTION INTRAVENOUS EVERY 6 HOURS
Status: DISCONTINUED | OUTPATIENT
Start: 2023-08-18 | End: 2023-08-23

## 2023-08-18 RX ORDER — HYDRALAZINE HYDROCHLORIDE 20 MG/ML
10 INJECTION INTRAMUSCULAR; INTRAVENOUS EVERY 4 HOURS PRN
Status: DISCONTINUED | OUTPATIENT
Start: 2023-08-18 | End: 2023-08-26

## 2023-08-18 RX ORDER — MORPHINE SULFATE 2 MG/ML
2 INJECTION, SOLUTION INTRAMUSCULAR; INTRAVENOUS EVERY 2 HOUR PRN
Status: DISCONTINUED | OUTPATIENT
Start: 2023-08-18 | End: 2023-08-26

## 2023-08-18 NOTE — PLAN OF CARE
Problem: Patient Centered Care  Goal: Patient preferences are identified and integrated in the patient's plan of care  Description: Interventions:  - What would you like us to know as we care for you?   - Provide timely, complete, and accurate information to patient/family  - Incorporate patient and family knowledge, values, beliefs, and cultural backgrounds into the planning and delivery of care  - Encourage patient/family to participate in care and decision-making at the level they choose  - Honor patient and family perspectives and choices  Outcome: Progressing    Problem: Diabetes/Glucose Control  Goal: Glucose maintained within prescribed range  Description: INTERVENTIONS:  - Monitor Blood Glucose as ordered  - Assess for signs and symptoms of hyperglycemia and hypoglycemia  - Administer ordered medications to maintain glucose within target range  - Assess barriers to adequate nutritional intake and initiate nutrition consult as needed  - Instruct patient on self management of diabetes  Outcome: Progressing     Problem: RISK FOR INFECTION - ADULT  Goal: Absence of fever/infection during anticipated neutropenic period  Description: INTERVENTIONS  - Monitor WBC  - Administer growth factors as ordered  - Implement neutropenic guidelines  Outcome: Progressing     Problem: DISCHARGE PLANNING  Goal: Discharge to home or other facility with appropriate resources  Description: INTERVENTIONS:  - Identify barriers to discharge w/pt and caregiver  - Include patient/family/discharge partner in discharge planning  - Arrange for needed discharge resources and transportation as appropriate  - Identify discharge learning needs (meds, wound care, etc)  - Arrange for interpreters to assist at discharge as needed  - Consider post-discharge preferences of patient/family/discharge partner  - Complete POLST form as appropriate  - Assess patient's ability to be responsible for managing their own health  - Refer to Case Management Department for coordinating discharge planning if the patient needs post-hospital services based on physician/LIP order or complex needs related to functional status, cognitive ability or social support system  Outcome: Progressing     Problem: Altered Communication/Language Barrier  Goal: Patient/Family is able to understand and participate in their care  Description: Interventions:  - Assess communication ability and preferred communication style  - Implement communication aides and strategies  - Use visual cues when possible  - Listen attentively, be patient, do not interrupt  - Minimize distractions  - Allow time for understanding and response  - Establish method for patient to ask for assistance (call light)  - Provide an  as needed  - Communicate barriers and strategies to overcome with those who interact with patient  Outcome: Progressing      B/L craniotomies with Dr Vida Hernandez. Pt oriented x4. Neuro checks stable and vitals stable on cardene gtt. Repeat CT in the morning. Pt's family updated about plan of care.

## 2023-08-18 NOTE — PLAN OF CARE
Patient is A&Ox4, on 2L NC, HOB kept below 20 degrees, Q1H neurochecks done. Weaned off of cardene gtt. Both FREDDY drain site dressings has old and new drainage. Dr. Ankush Sue aware. Patient vomited x3, zofran given x1. Reglan given x1. Dilauded given x1 for pain. Bed is locked and in lowest position. Safety measures maintained. Call light is within reach.    Problem: Patient Centered Care  Goal: Patient preferences are identified and integrated in the patient's plan of care  Description: Interventions:  - What would you like us to know as we care for you?   - Provide timely, complete, and accurate information to patient/family  - Incorporate patient and family knowledge, values, beliefs, and cultural backgrounds into the planning and delivery of care  - Encourage patient/family to participate in care and decision-making at the level they choose  - Honor patient and family perspectives and choices  Outcome: Progressing     Problem: Patient/Family Goals  Goal: Patient/Family Long Term Goal  Description: Patient's Long Term Goal: to go home    Interventions:  - follow MD orders  - See additional Care Plan goals for specific interventions  Outcome: Progressing  Goal: Patient/Family Short Term Goal  Description: Patient's Short Term Goal: to be pain free    Interventions:   - follow medication regimen  - follow MD orders  - See additional Care Plan goals for specific interventions  Outcome: Progressing     Problem: Diabetes/Glucose Control  Goal: Glucose maintained within prescribed range  Description: INTERVENTIONS:  - Monitor Blood Glucose as ordered  - Assess for signs and symptoms of hyperglycemia and hypoglycemia  - Administer ordered medications to maintain glucose within target range  - Assess barriers to adequate nutritional intake and initiate nutrition consult as needed  - Instruct patient on self management of diabetes  Outcome: Progressing     Problem: RISK FOR INFECTION - ADULT  Goal: Absence of fever/infection during anticipated neutropenic period  Description: INTERVENTIONS  - Monitor WBC  - Administer growth factors as ordered  - Implement neutropenic guidelines  Outcome: Progressing     Problem: DISCHARGE PLANNING  Goal: Discharge to home or other facility with appropriate resources  Description: INTERVENTIONS:  - Identify barriers to discharge w/pt and caregiver  - Include patient/family/discharge partner in discharge planning  - Arrange for needed discharge resources and transportation as appropriate  - Identify discharge learning needs (meds, wound care, etc)  - Arrange for interpreters to assist at discharge as needed  - Consider post-discharge preferences of patient/family/discharge partner  - Complete POLST form as appropriate  - Assess patient's ability to be responsible for managing their own health  - Refer to Case Management Department for coordinating discharge planning if the patient needs post-hospital services based on physician/LIP order or complex needs related to functional status, cognitive ability or social support system  Outcome: Progressing     Problem: Altered Communication/Language Barrier  Goal: Patient/Family is able to understand and participate in their care  Description: Interventions:  - Assess communication ability and preferred communication style  - Implement communication aides and strategies  - Use visual cues when possible  - Listen attentively, be patient, do not interrupt  - Minimize distractions  - Allow time for understanding and response  - Establish method for patient to ask for assistance (call light)  - Provide an  as needed  - Communicate barriers and strategies to overcome with those who interact with patient  Outcome: Progressing

## 2023-08-18 NOTE — SLP NOTE
ADULT SWALLOWING EVALUATION    ASSESSMENT    ASSESSMENT/OVERALL IMPRESSION:    Proper PPE worn. Surgical mask and gloves. Hands sanitized upon entrance/exit Pt room. This BSE was ordered 2/2 Pt S/P (L) & (R) craniotomy for evacuation of subdural hematoma on 8/17/23. Pt admitted 8/16/23 with chronic subdural hematoma. Pt on solid/thin liquids at home, with spouse. PMH includes TBI, CKD Stage 4, HTN, DM. No PMH of dysphagia at [facility]. Pt denies any past swallowing difficulty. Current diet clear liquids. CT Brain 8/18/23:  CONCLUSION:    Postsurgical changes of bilateral frontal craniotomies with insertion of bilateral subdural drains. There is significant decrease in size of bilateral subdural hematomas on the left measuring 2.0 cm previously 2.5 cm and on the right measuring 1.0 cm previously 2.3 cm. New hyperdense blood products and a small volume of pneumocephalus presumably postsurgical.      Stable to mildly increased rightward midline shift measuring 4 mm. Question minimal subfalcine herniation. CXR 8/18/23:  CONCLUSION: No significant lung edema     Pt limited to HOB at 20 degrees in a.m. Received message from RN that [facility] to be raised to 45 degrees to complete swallow evaluation. Pt alert, on 2L/min 02 NC, afebrile and assessed sitting in bed at 45 degrees (after consulting with RN). Spouse present. Pt agreed to participate. Vocal quality/intensity weak. Volitional swallow and cough present; considered functional. Oral motor exam revealed grossly intact labial and lingual skills for rate, ROM and strength. Functional dentition. Pt was fed pureed (applesauce) and thin liquids via straw and pinched straw. Bilabial seal adequate; no anterior loss. Lingual skills adequate for bolus formation and preparation of both consistencies. Oral cavity clear post swallows.      Pharyngeal response appeared slightly delayed per hyolaryngeal elevation to completion (considered weak in strength/rise to palpation). No overt clinical signs/symptoms of aspiration on pureed nor thin liquids (no throat clearing, no coughing, no SOB and clear vocal quality), including swallows of thin liquids via straw/pinched straw. Overall, swallow function appeared coordinated. Sp02 ~97% during this assessment. IMPRESSIONS:    Pt presents with functional oral swallow (for consistencies assessed) and possible pharyngeal dysfunction. Collaborated with RN regarding Pt's swallowing plan of care. BSE results/recommendations discussed with Pt/spouse. Pt/spouse v/u; would benefit from additional reinforcement as diet is upgraded. Swallowing precautions written on white board in room for reinforcement/carry-over of skills for Pt, family and staff. Call light within Pt's reach upon SLP discharge from room. PLAN:    To f/u x3 sessions to ensure safe intake of diet and reinforce swallowing/aspiration precautions. To monitor for new CXR results. Diet upgrade as tolerated- with MD approval and HOB in upright position. Family education to be continued as available. RECOMMENDATIONS   Diet Recommendations - Solids:  (Pt on clear liquids)  Diet Recommendations - Liquids:  Thin Liquids    Compensatory Strategies Recommended: Pinched straw sips  Aspiration Precautions: Slow rate;Upright position (for HOB as permitted by MD)  Medication Administration Recommendations: Whole in puree  Treatment Plan/Recommendations: Aspiration precautions  Discharge Recommendations/Plan: Undetermined    HISTORY   MEDICAL HISTORY  Reason for Referral: Altered diet consistency (MD requesting be completed with HOB 45 degrees)    Problem List  Principal Problem:    Chronic subdural hematoma (HCC)  Active Problems:    CKD (chronic kidney disease) stage 4, GFR 15-29 ml/min (HCC)    Anemia in stage 4 chronic kidney disease       Past Medical History  Past Medical History:   Diagnosis Date    Chronic kidney disease, stage 4 (severe) ([address]     Diabetes ([address] Essential hypertension     TBI (traumatic brain injury) [address] [address] [facility]        Prior Living Situation: Home with spouse  Diet Prior to Admission: Regular; Thin liquids  Precautions: Aspiration    Patient/Family Goals: to eat    SWALLOWING HISTORY  Current Diet Consistency:  (clear liquids)    OBJECTIVE   ORAL MOTOR EXAMINATION  Dentition: Natural;Functional  Symmetry: Within Functional Limits  Strength: Within Functional Limits  Range of Motion: Within Functional Limits  Rate of Motion: Within Functional Limits    Voice Quality: Weak  Respiratory Status: Supplemental O2;Nasal cannula  Consistencies Trialed: Thin liquids;Puree  Method of Presentation: Staff/Clinician assistance;Cup;Straw;Single sips  Patient Positioning: Reclined (45 degrees)    Oral Phase of Swallow: Within Functional Limits (for consistencies assessed)  Pharyngeal Phase of Swallow: Impaired  Laryngeal Elevation Timing: Appears impaired    (Please note: Silent aspiration cannot be evaluated clinically. Videofluoroscopic Swallow Study is required to rule-out silent aspiration.)    GOALS  Goal #1 The patient will tolerate clear liquids  consistency without overt signs or symptoms of aspiration with 100 % accuracy over one session(s). In Progress   Goal #2 The patient will utilize compensatory strategies as outlined by  BSSE (clinical evaluation) including Slow rate, Small bites, Small sips, Upright 90 degrees (as approved by MD) with PRN feeding assistance 90 % of the time across 3 sessions. In Progress   Goal #3 The patient will tolerate trial upgrade of SOFT/SOLID (with MD approval) consistency and thin liquids without overt signs or symptoms of aspiration with 100 % accuracy over 2 session(s).   In Progress   FOLLOW UP  Treatment Plan/Recommendations: Aspiration precautions  Number of Visits to Meet Established Goals: 3  Follow Up Needed (Documentation Required): Yes  SLP Follow-up Date: 08/19/23    Thank you for your referral.   If you have any questions, please contact   Jorge A Parsons M.S. RENETTA/SLP  Speech-Language Pathologist  [facility]  #[phone]

## 2023-08-18 NOTE — PROGRESS NOTES
08/18/23 1100   VISIT TYPE   SLP Inpatient Visit Type (Documentation Required) Attempted Evaluation   FOLLOW UP/PLAN   Follow Up Needed (Documentation Required) Yes   SLP Follow-up Date 08/19/23     BSE orders received/acknowledged. At this time, HOB limited to 20 degrees. Pt considered unsafe for oral trials at this position. Will complete BSE on 8/19/23 if able to raise HOB. RN in agreement with this plan of care.

## 2023-08-18 NOTE — PROGRESS NOTES
08/17/23 1918   Clinical Encounter Type   Visited With Patient not available;Health care provider   Routine Visit Introduction   Continue Visiting Yes          responded to a consult for support. Pt wasn't available.     Amarilys Bradshaw, Chaplain Resident

## 2023-08-18 NOTE — CM/SW NOTE
PT/OT evaluations pending. Met with patient's brother [place] & wife at bedside, patient asleep. Patient and wife live in a 2 level home w/ 15 stairs. Patient is independent with ADLs, he does not own DME. Denies current home services, but goes to OP therapy. Patient went to [facility] following previous stroke 3 earlier this year. Discussed discharge plan. Family open to team recommendations but hopeful patient can return home w/ continued OP therapy.     Bushra Christina, [address]

## 2023-08-19 ENCOUNTER — APPOINTMENT (OUTPATIENT)
Dept: CT IMAGING | Facility: HOSPITAL | Age: 73
End: 2023-08-19
Attending: STUDENT IN AN ORGANIZED HEALTH CARE EDUCATION/TRAINING PROGRAM
Payer: MEDICARE

## 2023-08-19 ENCOUNTER — APPOINTMENT (OUTPATIENT)
Dept: CV DIAGNOSTICS | Facility: HOSPITAL | Age: 73
End: 2023-08-19
Attending: NURSE PRACTITIONER
Payer: MEDICARE

## 2023-08-19 LAB
ANION GAP SERPL CALC-SCNC: 7 MMOL/L (ref 0–18)
BASOPHILS # BLD AUTO: 0.01 X10(3) UL (ref 0–0.2)
BASOPHILS NFR BLD AUTO: 0.1 %
BUN BLD-MCNC: 48 MG/DL (ref 7–18)
BUN/CREAT SERPL: 17.2 (ref 10–20)
CALCIUM BLD-MCNC: 7.4 MG/DL (ref 8.5–10.1)
CHLORIDE SERPL-SCNC: 117 MMOL/L (ref 98–112)
CO2 SERPL-SCNC: 21 MMOL/L (ref 21–32)
CREAT BLD-MCNC: 2.79 MG/DL
DEPRECATED RDW RBC AUTO: 58.5 FL (ref 35.1–46.3)
EGFRCR SERPLBLD CKD-EPI 2021: 23 ML/MIN/1.73M2 (ref 60–?)
EOSINOPHIL # BLD AUTO: 0.01 X10(3) UL (ref 0–0.7)
EOSINOPHIL NFR BLD AUTO: 0.1 %
ERYTHROCYTE [DISTWIDTH] IN BLOOD BY AUTOMATED COUNT: 17.7 % (ref 11–15)
GLUCOSE BLD-MCNC: 188 MG/DL (ref 70–99)
GLUCOSE BLDC GLUCOMTR-MCNC: 184 MG/DL (ref 70–99)
GLUCOSE BLDC GLUCOMTR-MCNC: 191 MG/DL (ref 70–99)
GLUCOSE BLDC GLUCOMTR-MCNC: 194 MG/DL (ref 70–99)
HCT VFR BLD AUTO: 29.6 %
HGB BLD-MCNC: 9.4 G/DL
IMM GRANULOCYTES # BLD AUTO: 0.09 X10(3) UL (ref 0–1)
IMM GRANULOCYTES NFR BLD: 0.9 %
LYMPHOCYTES # BLD AUTO: 0.96 X10(3) UL (ref 1–4)
LYMPHOCYTES NFR BLD AUTO: 9.1 %
MCH RBC QN AUTO: 29.1 PG (ref 26–34)
MCHC RBC AUTO-ENTMCNC: 31.8 G/DL (ref 31–37)
MCV RBC AUTO: 91.6 FL
MONOCYTES # BLD AUTO: 0.84 X10(3) UL (ref 0.1–1)
MONOCYTES NFR BLD AUTO: 8 %
NEUTROPHILS # BLD AUTO: 8.61 X10 (3) UL (ref 1.5–7.7)
NEUTROPHILS # BLD AUTO: 8.61 X10(3) UL (ref 1.5–7.7)
NEUTROPHILS NFR BLD AUTO: 81.8 %
OSMOLALITY SERPL CALC.SUM OF ELEC: 318 MOSM/KG (ref 275–295)
PLATELET # BLD AUTO: 184 10(3)UL (ref 150–450)
POTASSIUM SERPL-SCNC: 4.7 MMOL/L (ref 3.5–5.1)
Q-T INTERVAL: 264 MS
QRS DURATION: 72 MS
QTC CALCULATION (BEZET): 398 MS
R AXIS: 7 DEGREES
RBC # BLD AUTO: 3.23 X10(6)UL
SODIUM SERPL-SCNC: 145 MMOL/L (ref 136–145)
T AXIS: 180 DEGREES
VENTRICULAR RATE: 137 BPM
WBC # BLD AUTO: 10.5 X10(3) UL (ref 4–11)

## 2023-08-19 PROCEDURE — 99233 SBSQ HOSP IP/OBS HIGH 50: CPT | Performed by: INTERNAL MEDICINE

## 2023-08-19 PROCEDURE — 99232 SBSQ HOSP IP/OBS MODERATE 35: CPT | Performed by: INTERNAL MEDICINE

## 2023-08-19 PROCEDURE — 93306 TTE W/DOPPLER COMPLETE: CPT | Performed by: NURSE PRACTITIONER

## 2023-08-19 PROCEDURE — 70450 CT HEAD/BRAIN W/O DYE: CPT | Performed by: STUDENT IN AN ORGANIZED HEALTH CARE EDUCATION/TRAINING PROGRAM

## 2023-08-19 RX ORDER — PROCHLORPERAZINE EDISYLATE 5 MG/ML
10 INJECTION INTRAMUSCULAR; INTRAVENOUS EVERY 6 HOURS PRN
Status: DISCONTINUED | OUTPATIENT
Start: 2023-08-19 | End: 2023-08-26

## 2023-08-19 RX ORDER — HALOPERIDOL 5 MG/ML
INJECTION INTRAMUSCULAR
Status: COMPLETED
Start: 2023-08-19 | End: 2023-08-19

## 2023-08-19 RX ORDER — HALOPERIDOL 5 MG/ML
1 INJECTION INTRAMUSCULAR EVERY 6 HOURS PRN
Status: DISCONTINUED | OUTPATIENT
Start: 2023-08-19 | End: 2023-08-26

## 2023-08-19 RX ORDER — HALOPERIDOL 2 MG/ML
2 SOLUTION ORAL EVERY 6 HOURS PRN
Status: DISCONTINUED | OUTPATIENT
Start: 2023-08-19 | End: 2023-08-19

## 2023-08-19 NOTE — PLAN OF CARE
CT of the head this am, see results. Q1 neuro checks implemented, see flowsheets. X1 episode of emisis w/ complaints of nausea, attending and neurosurgery PA notified, no new interventions implemented, see manage orders for in place orders for interventions. Frequent monitoring implemented. Bed placed in lowest position with call light within reach. Pt's x3 daughters and wife at bedside, updated on plan of care.       Problem: Diabetes/Glucose Control  Goal: Glucose maintained within prescribed range  Description: INTERVENTIONS:  - Monitor Blood Glucose as ordered  - Assess for signs and symptoms of hyperglycemia and hypoglycemia  - Administer ordered medications to maintain glucose within target range  - Assess barriers to adequate nutritional intake and initiate nutrition consult as needed  - Instruct patient on self management of diabetes  Outcome: Not Progressing     Problem: GASTROINTESTINAL - ADULT  Goal: Minimal or absence of nausea and vomiting  Description: INTERVENTIONS:  - Maintain adequate hydration with IV or PO as ordered and tolerated  - Nasogastric tube to low intermittent suction as ordered  - Evaluate effectiveness of ordered antiemetic medications  - Provide nonpharmacologic comfort measures as appropriate  - Advance diet as tolerated, if ordered  - Obtain nutritional consult as needed  - Evaluate fluid balance  Outcome: Not Progressing     Problem: METABOLIC/FLUID AND ELECTROLYTES - ADULT  Goal: Glucose maintained within prescribed range  Description: INTERVENTIONS:  - Monitor Blood Glucose as ordered  - Assess for signs and symptoms of hyperglycemia and hypoglycemia  - Administer ordered medications to maintain glucose within target range  - Assess barriers to adequate nutritional intake and initiate nutrition consult as needed  - Instruct patient on self management of diabetes  Outcome: Not Progressing     Problem: RISK FOR INFECTION - ADULT  Goal: Absence of fever/infection during anticipated neutropenic period  Description: INTERVENTIONS  - Monitor WBC  - Administer growth factors as ordered  - Implement neutropenic guidelines  Outcome: Progressing     Problem: METABOLIC/FLUID AND ELECTROLYTES - ADULT  Goal: Electrolytes maintained within normal limits  Description: INTERVENTIONS:  - Monitor labs and rhythm and assess patient for signs and symptoms of electrolyte imbalances  - Administer electrolyte replacement as ordered  - Monitor response to electrolyte replacements, including rhythm and repeat lab results as appropriate  - Fluid restriction as ordered  - Instruct patient on fluid and nutrition restrictions as appropriate  Outcome: Progressing     Problem: HEMATOLOGIC - ADULT  Goal: Maintains hematologic stability  Description: INTERVENTIONS  - Assess for signs and symptoms of bleeding or hemorrhage  - Monitor labs and vital signs for trends  - Administer supportive blood products/factors, fluids and medications as ordered and appropriate  - Administer supportive blood products/factors as ordered and appropriate  Outcome: Progressing  Goal: Free from bleeding injury  Description: (Example usage: patient with low platelets)  INTERVENTIONS:  - Avoid intramuscular injections, enemas and rectal medication administration  - Ensure safe mobilization of patient  - Hold pressure on venipuncture sites to achieve adequate hemostasis  - Assess for signs and symptoms of internal bleeding  - Monitor lab trends  - Patient is to report abnormal signs of bleeding to staff  - Avoid use of toothpicks and dental floss  - Use electric shaver for shaving  - Use soft bristle tooth brush  - Limit straining and forceful nose blowing  Outcome: Progressing     Problem: NEUROLOGICAL - ADULT  Goal: Achieves stable or improved neurological status  Description: INTERVENTIONS  - Assess for and report changes in neurological status  - Initiate measures to prevent increased intracranial pressure  - Maintain blood pressure and fluid volume within ordered parameters to optimize cerebral perfusion and minimize risk of hemorrhage  - Monitor temperature, glucose, and sodium.  Initiate appropriate interventions as ordered  Outcome: Progressing  Goal: Absence of seizures  Description: INTERVENTIONS  - Monitor for seizure activity  - Administer anti-seizure medications as ordered  - Monitor neurological status  Outcome: Progressing     Problem: Impaired Swallowing  Goal: Minimize aspiration risk  Description: Interventions:  - Patient should be alert and upright for all feedings (90 degrees preferred)  - Offer food and liquids at a slow rate  - No straws  - Encourage small bites of food and small sips of liquid  - Offer pills one at a time, crush or deliver with applesauce as needed  - Discontinue feeding and notify MD (or speech pathologist) if coughing or persistent throat clearing or wet/gurgly vocal quality is noted  Outcome: Progressing

## 2023-08-20 ENCOUNTER — APPOINTMENT (OUTPATIENT)
Dept: CT IMAGING | Facility: HOSPITAL | Age: 73
End: 2023-08-20
Attending: NEUROLOGICAL SURGERY
Payer: MEDICARE

## 2023-08-20 LAB
ANION GAP SERPL CALC-SCNC: 7 MMOL/L (ref 0–18)
BASOPHILS # BLD AUTO: 0.02 X10(3) UL (ref 0–0.2)
BASOPHILS NFR BLD AUTO: 0.2 %
BLOOD TYPE BARCODE: 5100
BUN BLD-MCNC: 53 MG/DL (ref 7–18)
BUN/CREAT SERPL: 16.3 (ref 10–20)
CALCIUM BLD-MCNC: 7.6 MG/DL (ref 8.5–10.1)
CHLORIDE SERPL-SCNC: 117 MMOL/L (ref 98–112)
CO2 SERPL-SCNC: 21 MMOL/L (ref 21–32)
CREAT BLD-MCNC: 3.26 MG/DL
DEPRECATED RDW RBC AUTO: 61.1 FL (ref 35.1–46.3)
EGFRCR SERPLBLD CKD-EPI 2021: 19 ML/MIN/1.73M2 (ref 60–?)
EOSINOPHIL # BLD AUTO: 0.01 X10(3) UL (ref 0–0.7)
EOSINOPHIL NFR BLD AUTO: 0.1 %
ERYTHROCYTE [DISTWIDTH] IN BLOOD BY AUTOMATED COUNT: 18.4 % (ref 11–15)
GLUCOSE BLD-MCNC: 208 MG/DL (ref 70–99)
GLUCOSE BLDC GLUCOMTR-MCNC: 142 MG/DL (ref 70–99)
GLUCOSE BLDC GLUCOMTR-MCNC: 149 MG/DL (ref 70–99)
GLUCOSE BLDC GLUCOMTR-MCNC: 180 MG/DL (ref 70–99)
GLUCOSE BLDC GLUCOMTR-MCNC: 204 MG/DL (ref 70–99)
HCT VFR BLD AUTO: 29.4 %
HGB BLD-MCNC: 9.2 G/DL
IMM GRANULOCYTES # BLD AUTO: 0.11 X10(3) UL (ref 0–1)
IMM GRANULOCYTES NFR BLD: 0.8 %
LYMPHOCYTES # BLD AUTO: 0.78 X10(3) UL (ref 1–4)
LYMPHOCYTES NFR BLD AUTO: 6 %
MCH RBC QN AUTO: 29 PG (ref 26–34)
MCHC RBC AUTO-ENTMCNC: 31.3 G/DL (ref 31–37)
MCV RBC AUTO: 92.7 FL
MONOCYTES # BLD AUTO: 1.29 X10(3) UL (ref 0.1–1)
MONOCYTES NFR BLD AUTO: 10 %
NEUTROPHILS # BLD AUTO: 10.75 X10 (3) UL (ref 1.5–7.7)
NEUTROPHILS # BLD AUTO: 10.75 X10(3) UL (ref 1.5–7.7)
NEUTROPHILS NFR BLD AUTO: 82.9 %
OSMOLALITY SERPL CALC.SUM OF ELEC: 320 MOSM/KG (ref 275–295)
PLATELET # BLD AUTO: 210 10(3)UL (ref 150–450)
POTASSIUM SERPL-SCNC: 4.7 MMOL/L (ref 3.5–5.1)
RBC # BLD AUTO: 3.17 X10(6)UL
SODIUM SERPL-SCNC: 145 MMOL/L (ref 136–145)
UNIT VOLUME: 350 ML
WBC # BLD AUTO: 13 X10(3) UL (ref 4–11)

## 2023-08-20 PROCEDURE — 99233 SBSQ HOSP IP/OBS HIGH 50: CPT | Performed by: HOSPITALIST

## 2023-08-20 PROCEDURE — 99232 SBSQ HOSP IP/OBS MODERATE 35: CPT | Performed by: INTERNAL MEDICINE

## 2023-08-20 PROCEDURE — 99231 SBSQ HOSP IP/OBS SF/LOW 25: CPT | Performed by: NEUROLOGICAL SURGERY

## 2023-08-20 PROCEDURE — 70450 CT HEAD/BRAIN W/O DYE: CPT | Performed by: NEUROLOGICAL SURGERY

## 2023-08-20 NOTE — PROGRESS NOTES
At 0000 neuro check pt was having expressive aphasia. Delayed response. Hard time responding orientation questions. Unsure why he was at hospital at moment. Also, stated \" I am having a hard time to express HIMSELF\" took pt to [facility] sooner. Results reviewed no changes. 0200:  pt speech more clear. Intermittently expressive aphasia. Slightly slow response. Drowsy but Oriented x4.     FREDDY L > Rt output charted. Serosanguineous. Sexton in place. Cont neuro chack q1h.

## 2023-08-21 LAB
BASOPHILS # BLD AUTO: 0.02 X10(3) UL (ref 0–0.2)
BASOPHILS NFR BLD AUTO: 0.2 %
DEPRECATED RDW RBC AUTO: 62.5 FL (ref 35.1–46.3)
EOSINOPHIL # BLD AUTO: 0.03 X10(3) UL (ref 0–0.7)
EOSINOPHIL NFR BLD AUTO: 0.2 %
ERYTHROCYTE [DISTWIDTH] IN BLOOD BY AUTOMATED COUNT: 18.1 % (ref 11–15)
GLUCOSE BLDC GLUCOMTR-MCNC: 150 MG/DL (ref 70–99)
GLUCOSE BLDC GLUCOMTR-MCNC: 209 MG/DL (ref 70–99)
GLUCOSE BLDC GLUCOMTR-MCNC: 219 MG/DL (ref 70–99)
GLUCOSE BLDC GLUCOMTR-MCNC: 235 MG/DL (ref 70–99)
HCT VFR BLD AUTO: 28 %
HGB BLD-MCNC: 8.6 G/DL
IMM GRANULOCYTES # BLD AUTO: 0.07 X10(3) UL (ref 0–1)
IMM GRANULOCYTES NFR BLD: 0.6 %
LYMPHOCYTES # BLD AUTO: 1.01 X10(3) UL (ref 1–4)
LYMPHOCYTES NFR BLD AUTO: 8.3 %
MAGNESIUM SERPL-MCNC: 2.7 MG/DL (ref 1.6–2.6)
MCH RBC QN AUTO: 29.1 PG (ref 26–34)
MCHC RBC AUTO-ENTMCNC: 30.7 G/DL (ref 31–37)
MCV RBC AUTO: 94.6 FL
MONOCYTES # BLD AUTO: 0.82 X10(3) UL (ref 0.1–1)
MONOCYTES NFR BLD AUTO: 6.8 %
NEUTROPHILS # BLD AUTO: 10.19 X10 (3) UL (ref 1.5–7.7)
NEUTROPHILS # BLD AUTO: 10.19 X10(3) UL (ref 1.5–7.7)
NEUTROPHILS NFR BLD AUTO: 83.9 %
PHOSPHATE SERPL-MCNC: 3.1 MG/DL (ref 2.5–4.9)
PLATELET # BLD AUTO: 192 10(3)UL (ref 150–450)
POTASSIUM SERPL-SCNC: 4.8 MMOL/L (ref 3.5–5.1)
RBC # BLD AUTO: 2.96 X10(6)UL
WBC # BLD AUTO: 12.1 X10(3) UL (ref 4–11)

## 2023-08-21 PROCEDURE — 99233 SBSQ HOSP IP/OBS HIGH 50: CPT | Performed by: HOSPITALIST

## 2023-08-21 PROCEDURE — 99233 SBSQ HOSP IP/OBS HIGH 50: CPT | Performed by: INTERNAL MEDICINE

## 2023-08-21 RX ORDER — AMLODIPINE BESYLATE 5 MG/1
5 TABLET ORAL DAILY
Status: DISCONTINUED | OUTPATIENT
Start: 2023-08-21 | End: 2023-08-24

## 2023-08-21 RX ORDER — HYDRALAZINE HYDROCHLORIDE 10 MG/1
10 TABLET, FILM COATED ORAL EVERY 8 HOURS SCHEDULED
Status: DISCONTINUED | OUTPATIENT
Start: 2023-08-21 | End: 2023-08-25

## 2023-08-21 RX ORDER — AMIODARONE HYDROCHLORIDE 200 MG/1
400 TABLET ORAL 2 TIMES DAILY WITH MEALS
Status: COMPLETED | OUTPATIENT
Start: 2023-08-21 | End: 2023-08-25

## 2023-08-21 NOTE — PLAN OF CARE
Problem: Patient Centered Care  Goal: Patient preferences are identified and integrated in the patient's plan of care  Description: Interventions:  - What would you like us to know as we care for you? I need help to eat.  Please note patient needs set up and assist with eating   - Provide timely, complete, and accurate information to patient/family  - Incorporate patient and family knowledge, values, beliefs, and cultural backgrounds into the planning and delivery of care  - Encourage patient/family to participate in care and decision-making at the level they choose  - Honor patient and family perspectives and choices  Outcome: Progressing     Problem: Patient/Family Goals  Goal: Patient/Family Long Term Goal  Description: Patient's Long Term Goal: to be discharged home    Interventions:  - See additional Care Plan goals for specific interventions  Outcome: Progressing  Goal: Patient/Family Short Term Goal  Description: Patient's Short Term Goal: to have less pain    Interventions:   - Monitor/assess pain Q4HR and PRN  - Administer pain medications, provide teaching on nonpharmacologic pain methods, relaxation techniques  - See additional Care Plan goals for specific interventions  Outcome: Progressing     Problem: Diabetes/Glucose Control  Goal: Glucose maintained within prescribed range  Description: INTERVENTIONS:  - Monitor Blood Glucose as ordered  - Assess for signs and symptoms of hyperglycemia and hypoglycemia  - Administer ordered medications to maintain glucose within target range  - Assess barriers to adequate nutritional intake and initiate nutrition consult as needed  - Instruct patient on self management of diabetes  Outcome: Progressing     Problem: RISK FOR INFECTION - ADULT  Goal: Absence of fever/infection during anticipated neutropenic period  Description: INTERVENTIONS  - Monitor WBC  - Administer growth factors as ordered  - Implement neutropenic guidelines  Outcome: Progressing     Problem: DISCHARGE PLANNING  Goal: Discharge to home or other facility with appropriate resources  Description: INTERVENTIONS:  - Identify barriers to discharge w/pt and caregiver  - Include patient/family/discharge partner in discharge planning  - Arrange for needed discharge resources and transportation as appropriate  - Identify discharge learning needs (meds, wound care, etc)  - Arrange for interpreters to assist at discharge as needed  - Consider post-discharge preferences of patient/family/discharge partner  - Complete POLST form as appropriate  - Assess patient's ability to be responsible for managing their own health  - Refer to Case Management Department for coordinating discharge planning if the patient needs post-hospital services based on physician/LIP order or complex needs related to functional status, cognitive ability or social support system  Outcome: Progressing     Problem: Altered Communication/Language Barrier  Goal: Patient/Family is able to understand and participate in their care  Description: Interventions:  - Assess communication ability and preferred communication style  - Implement communication aides and strategies  - Use visual cues when possible  - Listen attentively, be patient, do not interrupt  - Minimize distractions  - Allow time for understanding and response  - Establish method for patient to ask for assistance (call light)  - Provide an  as needed  - Communicate barriers and strategies to overcome with those who interact with patient  Outcome: Progressing     Problem: GASTROINTESTINAL - ADULT  Goal: Minimal or absence of nausea and vomiting  Description: INTERVENTIONS:  - Maintain adequate hydration with IV or PO as ordered and tolerated  - Nasogastric tube to low intermittent suction as ordered  - Evaluate effectiveness of ordered antiemetic medications  - Provide nonpharmacologic comfort measures as appropriate  - Advance diet as tolerated, if ordered  - Obtain nutritional consult as needed  - Evaluate fluid balance  Outcome: Progressing  Goal: Maintains or returns to baseline bowel function  Description: INTERVENTIONS:  - Assess bowel function  - Maintain adequate hydration with IV or PO as ordered and tolerated  - Evaluate effectiveness of GI medications  - Encourage mobilization and activity  - Obtain nutritional consult as needed  - Establish a toileting routine/schedule  - Consider collaborating with pharmacy to review patient's medication profile  Outcome: Progressing     Problem: SKIN/TISSUE INTEGRITY - ADULT  Goal: Skin integrity remains intact  Description: INTERVENTIONS  - Assess and document risk factors for pressure ulcer development  - Assess and document skin integrity  - Monitor for areas of redness and/or skin breakdown  - Initiate interventions, skin care algorithm/standards of care as needed  Outcome: Progressing  Goal: Incision(s), wounds(s) or drain site(s) healing without S/S of infection  Description: INTERVENTIONS:  - Assess and document risk factors for pressure ulcer development  - Assess and document skin integrity  - Assess and document dressing/incision, wound bed, drain sites and surrounding tissue  - Implement wound care per orders  - Initiate isolation precautions as appropriate  - Initiate Pressure Ulcer prevention bundle as indicated  Outcome: Progressing     Problem: HEMATOLOGIC - ADULT  Goal: Maintains hematologic stability  Description: INTERVENTIONS  - Assess for signs and symptoms of bleeding or hemorrhage  - Monitor labs and vital signs for trends  - Administer supportive blood products/factors, fluids and medications as ordered and appropriate  - Administer supportive blood products/factors as ordered and appropriate  Outcome: Progressing  Goal: Free from bleeding injury  Description: (Example usage: patient with low platelets)  INTERVENTIONS:  - Avoid intramuscular injections, enemas and rectal medication administration  - Ensure safe mobilization of patient  - Hold pressure on venipuncture sites to achieve adequate hemostasis  - Assess for signs and symptoms of internal bleeding  - Monitor lab trends  - Patient is to report abnormal signs of bleeding to staff  - Avoid use of toothpicks and dental floss  - Use electric shaver for shaving  - Use soft bristle tooth brush  - Limit straining and forceful nose blowing  Outcome: Progressing     Problem: NEUROLOGICAL - ADULT  Goal: Achieves stable or improved neurological status  Description: INTERVENTIONS  - Assess for and report changes in neurological status  - Initiate measures to prevent increased intracranial pressure  - Maintain blood pressure and fluid volume within ordered parameters to optimize cerebral perfusion and minimize risk of hemorrhage  - Monitor temperature, glucose, and sodium.  Initiate appropriate interventions as ordered  Outcome: Progressing  Goal: Absence of seizures  Description: INTERVENTIONS  - Monitor for seizure activity  - Administer anti-seizure medications as ordered  - Monitor neurological status  Outcome: Progressing  Goal: Remains free of injury related to seizure activity  Description: INTERVENTIONS:  - Maintain airway, patient safety  and administer oxygen as ordered  - Monitor patient for seizure activity, document and report duration and description of seizure to MD/LIP  - If seizure occurs, turn patient to side and suction secretions as needed  - Reorient patient post seizure  - Seizure pads on all 4 side rails  - Instruct patient/family to notify RN of any seizure activity  - Instruct patient/family to call for assistance with activity based on assessment  Outcome: Progressing     Problem: Impaired Swallowing  Goal: Minimize aspiration risk  Description: Interventions:  - Patient should be alert and upright for all feedings (90 degrees preferred)  - Offer food and liquids at a slow rate  - No straws  - Encourage small bites of food and small sips of liquid  - Offer pills one at a time, crush or deliver with applesauce as needed  - Discontinue feeding and notify MD (or speech pathologist) if coughing or persistent throat clearing or wet/gurgly vocal quality is noted  Outcome: Progressing

## 2023-08-21 NOTE — OCCUPATIONAL THERAPY NOTE
Attempt made for occupational therapy treatment. Spoke with ERYN Hernandez. Patient is not medically appropriate to participate in skilled occupational therapy evaluation. ERYN Bhardwaj reported that patient is pending further neurosurgery plan. Also, updated neurosurgery note from BOWEN Camacho from this AM states \"continue head of bed restrictions. HOB 30 degrees or less\". Will continue to follow.

## 2023-08-21 NOTE — SLP NOTE
SPEECH DAILY NOTE - INPATIENT    ASSESSMENT & PLAN   ASSESSMENT  PPE REQUIRED. THIS THERAPIST WORE GLOVES, DROPLET MASK, AND GOGGLES FOR DURATION OF EVALUATION. HANDS WASHED UPON ENTRANCE/EXIT. SLP f/u for ongoing dysphagia tx/meal assessment per recommendations of CLD (thin liquids) per BSE. RN reports pt upgraded to regular/thin consistencies today by MD. Pt denies any swallowing challenges, however, pt with intermittent LINNEA. Pt positioned upright in bed (cleared to [facility] by RN for solids trials). Pt afebrile, tolerating 4L/Min O2NC with oxygen status 93 prior to the start of oral trials. SLP reviewed aspiration precautions and safe swallowing compensatory strategies with the patient. Safe swallow guidelines remain written on the white board in purple. Diet recommendations remain on the whiteboard in the room. Patient v/u but would benefit from continued education due to fluctuating LINNEA. Provided 1:1 feed assistance, pt tolerates thin liquids via pinched straws with no overt clinical signs/symptoms of aspiration. Pt trialed with puree and soft solids. Pt with intact bite but increased SARAH on advanced solids. No CSA observed. Oxygen status remained >91 t/o the entire session. Respiratory Rate WFL. Oral/buccal cavities clear of residue following liquid rinse. MOST RECENT CXR 8/18/23:  CONCLUSION: No significant lung edema      Dictated by (CST): Mary Sims MD on 8/18/2023 at 2:02 PM       Finalized by (CST): Mary Sims MD on 8/18/2023 at 2:02 PM     PLAN: SLP to f/u x2 meal assessments, monitor CXR, and VFSS if clinically warranted. Diet Recommendations - Solids: Mechanical soft ground/ Minced & Moist  Diet Recommendations - Liquids:  Thin Liquids    Compensatory Strategies Recommended: Pinched straw sips  Aspiration Precautions: Slow rate;Upright position (for HOB as permitted by MD)  Medication Administration Recommendations: Whole in puree    Patient Experiencing Pain: No      Discharge Recommendations  Discharge Recommendations/Plan: Undetermined    Treatment Plan  Treatment Plan/Recommendations: Aspiration precautions    Interdisciplinary Communication: Discussed with RN  Recommendations posted at bedside            GOALS  Goal #1 The patient will tolerate clear liquids  consistency without overt signs or symptoms of aspiration with 100 % accuracy over one session(s). Pt tolerates thin liquids with no CSA observed. Upgraded to Minced and moist and thin liquids via pinched straws. The patient will tolerate minced and moist  consistency without overt signs or symptoms of aspiration with 100 % accuracy over one session(s). Initiated 8/21   Goal met/modified   Goal #2 The patient will utilize compensatory strategies as outlined by  BSSE (clinical evaluation) including Slow rate, Small bites, Small sips, Upright 90 degrees (as approved by MD) with PRN feeding assistance 90 % of the time across 3 sessions. Pt upright in bed (cleared by RN for meals) for slow/small bites/sips with 1:1 feed by SLP. Pinched straws utilized. In Progress   Goal #3 The patient will tolerate trial upgrade of SOFT/SOLID (with MD approval) consistency and thin liquids without overt signs or symptoms of aspiration with 100 % accuracy over 2 session(s). MD advanced pt to regular/thin consistencies. Pt not safe for regular solids and LINNEA fluctuating and increased SARAH. Recommend MINCED AND MOIST solids. In Progress     FOLLOW UP  Follow Up Needed (Documentation Required): Yes  SLP Follow-up Date: 08/22/23  Number of Visits to Meet Established Goals: 2    Session: 1 following BSE    If you have any questions, please contact Jazmin Gan, CHARLENE Farooq  Speech Language Pathologist  Phone Number Ext. [phone]

## 2023-08-21 NOTE — PROGRESS NOTES
PT evaluation orders received and chart reviewed. Per RN- hold therapy at this time until medical  care plan in place. Possible transfer? Will re-attempt this p.m.  2nd attempt in p.m. Updated neurosurgery note from BOWEN Guerrero from this AM states \"continue head of bed restrictions. HOB 30 degrees or less\".      Thank you,  Karina Tamez, PT, DPT

## 2023-08-22 LAB
ANION GAP SERPL CALC-SCNC: 6 MMOL/L (ref 0–18)
BASOPHILS # BLD AUTO: 0.03 X10(3) UL (ref 0–0.2)
BASOPHILS NFR BLD AUTO: 0.3 %
BUN BLD-MCNC: 50 MG/DL (ref 7–18)
BUN/CREAT SERPL: 16.1 (ref 10–20)
C DIFF TOX B STL QL: POSITIVE
CALCIUM BLD-MCNC: 7.4 MG/DL (ref 8.5–10.1)
CHLORIDE SERPL-SCNC: 114 MMOL/L (ref 98–112)
CO2 SERPL-SCNC: 21 MMOL/L (ref 21–32)
CREAT BLD-MCNC: 3.11 MG/DL
DEPRECATED RDW RBC AUTO: 58 FL (ref 35.1–46.3)
EGFRCR SERPLBLD CKD-EPI 2021: 20 ML/MIN/1.73M2 (ref 60–?)
EOSINOPHIL # BLD AUTO: 0.08 X10(3) UL (ref 0–0.7)
EOSINOPHIL NFR BLD AUTO: 0.7 %
ERYTHROCYTE [DISTWIDTH] IN BLOOD BY AUTOMATED COUNT: 17.5 % (ref 11–15)
GLUCOSE BLD-MCNC: 222 MG/DL (ref 70–99)
GLUCOSE BLDC GLUCOMTR-MCNC: 171 MG/DL (ref 70–99)
GLUCOSE BLDC GLUCOMTR-MCNC: 187 MG/DL (ref 70–99)
GLUCOSE BLDC GLUCOMTR-MCNC: 202 MG/DL (ref 70–99)
HCT VFR BLD AUTO: 29.6 %
HGB BLD-MCNC: 9.4 G/DL
IMM GRANULOCYTES # BLD AUTO: 0.05 X10(3) UL (ref 0–1)
IMM GRANULOCYTES NFR BLD: 0.4 %
LYMPHOCYTES # BLD AUTO: 0.93 X10(3) UL (ref 1–4)
LYMPHOCYTES NFR BLD AUTO: 8.1 %
MAGNESIUM SERPL-MCNC: 2.5 MG/DL (ref 1.6–2.6)
MCH RBC QN AUTO: 29.4 PG (ref 26–34)
MCHC RBC AUTO-ENTMCNC: 31.8 G/DL (ref 31–37)
MCV RBC AUTO: 92.5 FL
MONOCYTES # BLD AUTO: 1.13 X10(3) UL (ref 0.1–1)
MONOCYTES NFR BLD AUTO: 9.9 %
NEUTROPHILS # BLD AUTO: 9.25 X10 (3) UL (ref 1.5–7.7)
NEUTROPHILS # BLD AUTO: 9.25 X10(3) UL (ref 1.5–7.7)
NEUTROPHILS NFR BLD AUTO: 80.6 %
OSMOLALITY SERPL CALC.SUM OF ELEC: 312 MOSM/KG (ref 275–295)
PHOSPHATE SERPL-MCNC: 2.9 MG/DL (ref 2.5–4.9)
PLATELET # BLD AUTO: 196 10(3)UL (ref 150–450)
POTASSIUM SERPL-SCNC: 4.8 MMOL/L (ref 3.5–5.1)
RBC # BLD AUTO: 3.2 X10(6)UL
SODIUM SERPL-SCNC: 141 MMOL/L (ref 136–145)
WBC # BLD AUTO: 11.5 X10(3) UL (ref 4–11)

## 2023-08-22 PROCEDURE — 99233 SBSQ HOSP IP/OBS HIGH 50: CPT | Performed by: HOSPITALIST

## 2023-08-22 PROCEDURE — 99233 SBSQ HOSP IP/OBS HIGH 50: CPT | Performed by: INTERNAL MEDICINE

## 2023-08-22 RX ORDER — CALCITRIOL 0.25 UG/1
0.25 CAPSULE, LIQUID FILLED ORAL DAILY
Status: DISCONTINUED | OUTPATIENT
Start: 2023-08-22 | End: 2023-08-26

## 2023-08-22 NOTE — OCCUPATIONAL THERAPY NOTE
Orders received, chart reviewed for OT evaluation. Communicated via EPIC secure chat with neuro PA Bailee Metz - to please place patient on hold as pt continues to be maintained on bed rest. Plan is for neurosx team to re-order therapy when drains are out. Will place on hold.

## 2023-08-22 NOTE — PROGRESS NOTES
Neurosurgery Update:   Right subdural drain removed without complication. Drain intact. Drain stitch tied securely to patient's scalp. Asked patient to cough multiple times for valsalva. No appreciable drainage from drain site with valsalva. Patient tolerated removal well. Continue plan of care as outlined in my progress note from this morning. Discussed with RN. Asked RN to monitor for drainage from drain site. If present, advised her to contact our team as a staple may need to be placed at the drain site if the stitch is not sufficient. Reviewed the plan of care with Anastasiya Patel, daughter at bedside.     Amanda Hobbs PA-C  Physician Assistant- Neurosurgery   [address] [facility]  8/22/2023, 1:03 PM

## 2023-08-22 NOTE — CM/SW NOTE
08/22/23 1500   CM/SW Referral Data   Referral Source    Reason for Referral Discharge planning   Informant Daughter   Medical Hx   Does patient have an established PCP? Yes  (Susan Carrillo- Dr. Kezia Rajput)   Significant Past Medical/Mental Health Hx recent hospitalization and rehab stay   Patient Info   Advanced directives? Yes   Patient's [address]   Number of Stair in Home 13 stairs to bedroom   Patient lives with Spouse/Significant other  (live house next daughter)   Patient Status Prior to Admission   Services in place prior to admission Other (comment)  (day rehab at [facility] after completing IP stay)   Discharge Needs   Anticipated D/C needs   (TBD)     Spoke to [employer] daughter today re: FMLA and dc planning needs. Patient was in day rehab at [facility] site prior to his admission. Carmita Ding had completed a prior IP acute stay. Described process of obtaining therapy recommendations prior to sending referrals. Pt and spouse live in a house next door to their daughter. Discussed HCPOA- CM will place order for spiritual care to meet with Carmita Ding on [facility] choice. PLAN:  DC plan still to be determined. Awaiting PT/OT recommendations. Yomi Dennis, RN Case Manager.

## 2023-08-23 ENCOUNTER — APPOINTMENT (OUTPATIENT)
Dept: GENERAL RADIOLOGY | Facility: HOSPITAL | Age: 73
End: 2023-08-23
Attending: INTERNAL MEDICINE
Payer: MEDICARE

## 2023-08-23 LAB
ANION GAP SERPL CALC-SCNC: 5 MMOL/L (ref 0–18)
BASOPHILS # BLD AUTO: 0.03 X10(3) UL (ref 0–0.2)
BASOPHILS NFR BLD AUTO: 0.3 %
BUN BLD-MCNC: 54 MG/DL (ref 7–18)
BUN/CREAT SERPL: 16.4 (ref 10–20)
C DIFF GDH + TOXINS A+B STL QL IA.RAPID: DETECTED
CALCIUM BLD-MCNC: 7.5 MG/DL (ref 8.5–10.1)
CHLORIDE SERPL-SCNC: 115 MMOL/L (ref 98–112)
CO2 SERPL-SCNC: 21 MMOL/L (ref 21–32)
CREAT BLD-MCNC: 3.3 MG/DL
DEPRECATED RDW RBC AUTO: 58.7 FL (ref 35.1–46.3)
EGFRCR SERPLBLD CKD-EPI 2021: 19 ML/MIN/1.73M2 (ref 60–?)
EOSINOPHIL # BLD AUTO: 0.16 X10(3) UL (ref 0–0.7)
EOSINOPHIL NFR BLD AUTO: 1.7 %
ERYTHROCYTE [DISTWIDTH] IN BLOOD BY AUTOMATED COUNT: 17.3 % (ref 11–15)
GLUCOSE BLD-MCNC: 103 MG/DL (ref 70–99)
GLUCOSE BLDC GLUCOMTR-MCNC: 133 MG/DL (ref 70–99)
GLUCOSE BLDC GLUCOMTR-MCNC: 143 MG/DL (ref 70–99)
GLUCOSE BLDC GLUCOMTR-MCNC: 155 MG/DL (ref 70–99)
GLUCOSE BLDC GLUCOMTR-MCNC: 159 MG/DL (ref 70–99)
GLUCOSE BLDC GLUCOMTR-MCNC: 92 MG/DL (ref 70–99)
HCT VFR BLD AUTO: 27.7 %
HGB BLD-MCNC: 8.6 G/DL
IMM GRANULOCYTES # BLD AUTO: 0.04 X10(3) UL (ref 0–1)
IMM GRANULOCYTES NFR BLD: 0.4 %
LYMPHOCYTES # BLD AUTO: 1.22 X10(3) UL (ref 1–4)
LYMPHOCYTES NFR BLD AUTO: 12.9 %
MAGNESIUM SERPL-MCNC: 2.7 MG/DL (ref 1.6–2.6)
MCH RBC QN AUTO: 28.9 PG (ref 26–34)
MCHC RBC AUTO-ENTMCNC: 31 G/DL (ref 31–37)
MCV RBC AUTO: 93 FL
MONOCYTES # BLD AUTO: 1.09 X10(3) UL (ref 0.1–1)
MONOCYTES NFR BLD AUTO: 11.5 %
NEUTROPHILS # BLD AUTO: 6.93 X10 (3) UL (ref 1.5–7.7)
NEUTROPHILS # BLD AUTO: 6.93 X10(3) UL (ref 1.5–7.7)
NEUTROPHILS NFR BLD AUTO: 73.2 %
OSMOLALITY SERPL CALC.SUM OF ELEC: 307 MOSM/KG (ref 275–295)
PHOSPHATE SERPL-MCNC: 2.7 MG/DL (ref 2.5–4.9)
PLATELET # BLD AUTO: 212 10(3)UL (ref 150–450)
POTASSIUM SERPL-SCNC: 4.8 MMOL/L (ref 3.5–5.1)
RBC # BLD AUTO: 2.98 X10(6)UL
SODIUM SERPL-SCNC: 141 MMOL/L (ref 136–145)
WBC # BLD AUTO: 9.5 X10(3) UL (ref 4–11)

## 2023-08-23 PROCEDURE — 99233 SBSQ HOSP IP/OBS HIGH 50: CPT | Performed by: INTERNAL MEDICINE

## 2023-08-23 PROCEDURE — 71045 X-RAY EXAM CHEST 1 VIEW: CPT | Performed by: INTERNAL MEDICINE

## 2023-08-23 RX ORDER — LEVETIRACETAM 500 MG/1
500 TABLET ORAL 2 TIMES DAILY
Status: DISCONTINUED | OUTPATIENT
Start: 2023-08-24 | End: 2023-08-26

## 2023-08-23 RX ORDER — VANCOMYCIN HYDROCHLORIDE 50 MG/ML
125 KIT ORAL 4 TIMES DAILY
Status: DISCONTINUED | OUTPATIENT
Start: 2023-08-23 | End: 2023-08-26

## 2023-08-23 RX ORDER — ACETAMINOPHEN 500 MG
1000 TABLET ORAL EVERY 6 HOURS
Status: DISCONTINUED | OUTPATIENT
Start: 2023-08-23 | End: 2023-08-26

## 2023-08-23 NOTE — CM/SW NOTE
Per Gordo Simpson, Dr. Sanchez Latif is unable to give recommendations for acute rehab at this time. She wishes to continue to follow [facility]. Request is for a psych consult, VENANCIO to reach out to Dr. Oscar Hess to see if he is in agreement for this request.    PLAN:  DC plan still to be determined. Dr. Sanchez Latif to continue to follow. CM to start acute rehab referrals.     Maxime Pike MBA BSN RN [address]  RN Case Manager  [phone]

## 2023-08-23 NOTE — PROGRESS NOTES
[place] [facility] - [facility]     Hospitalist Progress Note     Torrey Ornelas Patient Status:  Inpatient    3/21/1950 MRN [id number]   Location [facility] 2W/SW Attending Prema Hale MD   Hosp Day # 6 PCP No primary care provider on file. Chief Complaint: Patient presents with:  Edema  Altered Mental Status  Weakness       Subjective:     Patient seen lying in bed. No family at bedside. Patient denies acute events overnight. Patient reports being able to sleep better. Denies new changes, headaches or vision changes. Objective:      Vital signs:   23  0700 23  0800 23  0900 23  1000   BP: 119/54 124/64 130/55 123/68   BP Location: Right arm Right arm Left arm Left arm   Pulse: 68 69 80 78   Resp:    Temp:       TempSrc:       SpO2: 90% 95% 94% 92%   Weight:       Height:           Intake/Output Summary (Last 24 hours) at 2023 1123  Last data filed at 2023 6586  Gross per 24 hour   Intake 2077.1 ml   Output 2005 ml   Net 72.1 ml           Physical Exam:    GENERAL:  Awake and alert, in no acute distress. HEENT: Left-sided drain in place. HEART:  S1 and S2 heard. RRR   LUNGS:  Air entry was good. No crackles or wheezes   ABDOMEN: Soft and non-tender. PSYCHIATRIC: Normal mood    Diagnostic Data:    Labs:    Recent Labs   Lab 23  2332 23  0350 23  0449 23  0341 23  0421   WBC 6.1   < > 12.1* 11.5* 9.5   HGB 8.8*   < > 8.6* 9.4* 8.6*   MCV 95.5   < > 94.6 92.5 93.0   .0   < > 192.0 196.0 212.0   INR 0.99  --   --   --   --     < > = values in this interval not displayed.        Recent Labs   Lab 23  2332 23  0350 23  0519 23  0550 23  0538 23  0449 23  0341 23  0421   *   < > 236*   < > 208*  --  222* 103*   BUN 53*   < > 51*   < > 53*  --  50* 54*   CREATSERUM 3.42*   < > 2.99*   < > 3.26*  --  3.11* 3.30*   CA 8.2*   < > 7.3*   < > 7.6*  --  7.4* 7.5* ALB 3.0*  --  2.5*  --   --   --   --   --       < > 142   < > 145  --  141 141   K 4.6   < > 5.1   < > 4.7 4.8 4.8 4.8      < > 114*   < > 117*  --  114* 115*   CO2 23.0   < > 20.0*   < > 21.0  --  21.0 21.0   ALKPHO 85  --  49  --   --   --   --   --    AST 29  --  16  --   --   --   --   --    ALT 24  --  14*  --   --   --   --   --    BILT 0.5  --  0.2  --   --   --   --   --    TP 7.3  --  5.7*  --   --   --   --   --     < > = values in this interval not displayed. Estimated Creatinine Clearance: 15.2 mL/min (A) (based on SCr of 3.3 mg/dL (H)). Recent Labs   Lab 08/16/23 2332   PTP 13.0   INR 0.99            COVID-19  No results found for: COVID19    Pro-Calcitonin  No results for input(s): PCT in the last 168 hours. Cardiac  Recent Labs   Lab 08/16/23 2332   PBNP 1,606*       Inflammatory Markers  No results for input(s): CRP, AMADOR, LDH, DDIMER in the last 168 hours. Culture:  No results found for this visit on 08/16/23. XR CHEST AP PORTABLE  (CPT=71045)    Result Date: 8/23/2023  CONCLUSION: Clear lungs    Dictated by (CST): Anatoliy Barnes MD on 8/23/2023 at 7:14 AM     Finalized by (CST): Anatoliy Barnes MD on 8/23/2023 at 7:16 AM                 Medications:    insulin detemir  8 Units Subcutaneous Noon    calcitriol  0.25 mcg Oral Daily    amiodarone  400 mg Oral BID with meals    amLODIPine  5 mg Oral Daily    hydrALAZINE  10 mg Oral Q8H [address]    acetaminophen  1,000 mg Intravenous Q6H    epoetin manasa  5,000 Units Subcutaneous Once per day on Mon Wed Fri    insulin aspart  1-7 Units Subcutaneous TID CC    levETIRAcetam  500 mg Intravenous Q12H       Assessment & Plan:      Acute on chronic subdural hematoma ([address]  -recent traumatic brain injury and episode of expressive aphasia  -Neurosurgery surgery consulted, s/p b/l craniotomy with drain placement on 8/17.   -appreciate further neurosurgery recs.    -repeat CT head (-)   -Continue avoiding anticoagulation  -Continue BP control.    -drain management per neurosurgery.  -Continue Keppra for seizure prophylaxis   -PT/OT as allowed per neurosurgery     New onset Afib with RVR  -In setting of acute illness as above  -Spontaneous converted to sinus rhythm  -currently remains in sinus rhythm  -Continue on amiodarone  -Neurology on consult, appreciate further recommendations  -will monitor closely     Acute renal failure on CKD (chronic kidney disease) stage 4, GFR 15-29 ml/min ([facility])  -Creatinine stabilizing  - Avoiding Nephrotoxic agents  -Nephrology on consult, appreciate further recommendations  - Cont to monitor        Hyperglycemia  -will monitor  -continue accuchecks qachs  -supplement with sliding scale. Anemia due to CKD  -currently hgb is stable.  -will monitor. Plan of care discussed with patient at bedside and with Rn. Discussed management/test result(s) with neurosurgery consultant    Quality:  DVT Prophylaxis: SCDs  CODE status: Full  Estimated date of discharge: TBD  Discharge is dependent on: clinical stability      Alonso Zafar MD              This note was prepared using [employer] voice recognition dictation software. As a result errors may occur. When identified these errors have been corrected.  While every attempt is made to correct errors during dictation discrepancies may still exist

## 2023-08-23 NOTE — PROGRESS NOTES
Left subdural drain removed without complication. Catheter tip intact. Drain stitch tied securely to the patient's scalp. Patient asked to cough for Valsalva. No apparent drainage from the drain site. Patient tolerated well. Discussed with RN. Monitor for drainage at the incision site. If present, please advise our neurosurgical team.  Stable will be placed. As drain has been discontinued, no restrictions on head of bed. Patient may be up and out of bed. May work with PT/OT. Plan to transfer to the floor tomorrow. Dragon speech recognition software was used to prepare this note. If a word or phrase is confusing, it is likely due to a failure of recognition. Please contact me with any questions or clarifications.

## 2023-08-23 NOTE — PROGRESS NOTES
[place] [facility] - [facility]    Progress Note    Bert Mata Patient Status:  Inpatient    3/21/1950 MRN [id number]   Location [facility] 2W/SW Attending Tommy Zaragoza MD   Hosp Day # 6 PCP No primary care provider on file.        Subjective:   Bert Mata is a(n) 75year old male     ROS:     Constitutional: Comfortable  ENMT:  Negative for ear drainage, hearing loss and nasal drainage  Eyes:  Negative for eye discharge and vision loss  Cardiovascular:  Negative for chest pain, sob, irregular heartbeat/palpitations  Respiratory:  Negative for cough, dyspnea and wheezing  Gastrointestinal:  Negative for abdominal pain, constipation, decreased appetite, diarrhea and vomiting  Genitourinary:  Negative for dysuria and hematuria  Endocrine:  Negative for abnormal sleep patterns, increased activity, polydipsia and polyphagia  Hema/Lymph:  Negative for easy bleeding and easy bruising  Integumentary:  Negative for pruritus and rash  Musculoskeletal:  Negative for bone/joint symptoms  Neurological:  Negative for gait disturbance  Psychiatric:  Negative for inappropriate interaction and psychiatric symptoms       23  1600   BP: 145/60   Pulse: 74   Resp: 15   Temp:            PHYSICAL EXAM:   Constitutional: appears well hydrated alert and responsive no acute distress noted  Head/Face: normocephalic  Eyes/Vision: normal extraocular motion is intact  Nose/Mouth/Throat:mucous membranes are moist and no oral lesions are noted  Neck/Thyroid: neck is supple without adenopathy  Lymphatic: no abnormal cervical, supraclavicular adenopathy is noted  Respiratory:  lungs are clear to auscultation bilaterally, normal respiratory effort  Cardiovascular: regular rate and rhythm no murmurs, gallups, or rubs  Abdomen: soft, non-tender, non-distended, BS normal  Vascular: well perfused femoral, and pedal pulses normal  Skin/Hair: no unusual rashes present, no abnormal bruising noted  Back/Spine: no abnormalities noted  Musculoskeletal: full ROM all extremities good strength  no deformities  Extremities: Trace edema  Neurological:  Grossly normal    Results:     Laboratory Data:  Lab Results   Component Value Date    WBC 9.5 08/23/2023    HGB 8.6 (L) 08/23/2023    HCT 27.7 (L) 08/23/2023    .0 08/23/2023    CREATSERUM 3.30 (H) 08/23/2023    BUN 54 (H) 08/23/2023     08/23/2023    K 4.8 08/23/2023     (H) 08/23/2023    CO2 21.0 08/23/2023     (H) 08/23/2023    CA 7.5 (L) 08/23/2023    ALB 2.5 (L) 08/18/2023    ALKPHO 49 08/18/2023    BILT 0.2 08/18/2023    TP 5.7 (L) 08/18/2023    AST 16 08/18/2023    ALT 14 (L) 08/18/2023    PTT 29.9 08/16/2023    INR 0.99 08/16/2023    MG 2.7 (H) 08/23/2023    PHOS 2.7 08/23/2023       Imaging:  [unfilled]   XR CHEST AP PORTABLE  (CPT=71045)    Result Date: 8/23/2023  CONCLUSION: Clear lungs    Dictated by (CST): Sonia Chambers MD on 8/23/2023 at 7:14 AM     Finalized by (CST): Sonia Chambers MD on 8/23/2023 at 7:16 AM             ASSESSMENT/PLAN:   Assessment       #1 subdural bleed still has 1 drain in doing okay    #2 CKD 4 creatinine up a little bit today 3.3    #3 anemia on Epogen     #4 C. difficile on oral Vanco  Continue present management discussed with daughter  8/23/2023  Marlon Chi.  Shweta Cordova MD

## 2023-08-23 NOTE — PROGRESS NOTES
08/23/23 1848   Clinical Encounter Type   Visited With Health care provider   Routine Visit Introduction   Continue Visiting No         PRN Pt not decisional to complete a POA at this time.  arrived and Pt was sleeping.    Rodriguez Pool, [place] Resident

## 2023-08-23 NOTE — PHYSICAL THERAPY NOTE
PHYSICAL THERAPY EVALUATION - INPATIENT     Room Number: 202/202-A  Evaluation Date: 8/23/2023  Type of Evaluation: Initial   Physician Order: PT Eval and Treat    Presenting Problem: s/p left and right craniotomy for evacuation of SDH 8/17/23, drain removed 8/23/23 1318. Co-Morbidities : TBI 6/2023, seizures, ckd, htn  Reason for Therapy: Mobility Dysfunction and Discharge Planning    PHYSICAL THERAPY ASSESSMENT     Patient is a 75year old male admitted 8/16/2023 for weakness, tremors, diagnosed with bilat SHD. Pt is s/p craniotomy for evacuation of SDH 8/17/23, drain removed this date 5. Patient's current impairments include dec strength, dec balance, impaired coordination, impaired safety awareness, impaired insight into his deficits, with functional deficits including impaired bed mobility, transfers, ambulation and stair negotiation, which are below the patient's pre-admission status. Patient will benefit from continued inpatient physical therapy to address above issues so that patient may achieve highest functional mobility level. The patient's Approx Degree of Impairment: 54.16% has been calculated based on documentation in the [facility] '6 clicks' Inpatient Basic Mobility Short Form. Research supports that patients with this level of impairment may benefit from [address] [place] [address] Dr. Pt is able to follow commands, is able to actively participate with therapies, and has good family support, all making him an excellent acute rehab candidate. Per notes in EMR and confirmed by patient, he went to [facility] in [facility] following his TBI, and just prior to admit had been going to day rehab, pt reports 3 hours/day, 3 days/week. Pt ok to see per rn, drain removed and pt no longer on bedrest. Pt recd in his bathroom with pct, rn reports patient had attempted to get out of bed by himself, Rn and pct present. Pt educated in role of PT, goals for session.      Pt is alert, able to follow all commands, but demonstrates impaired safety awareness. Pt also presents with flat affect throughout session. Pt stood from toilet with min a of two, gait training with bilat hand held assist of two. Pt ambulation is ataxic with narrow THIEN, cueing to improve. Pt appeared unaware of this when discussed. Pt returned to supine at rn request due to his recent attempt at getting out of bed alone and high fall risk. Pt returned to supine with min a of two, bed alarm activated, all 4 bed rails up per rn. Discussed with pt POC, dc recommendations, with extensive education in call light use, high fall risk. Pt expressed good understanding. DISCHARGE RECOMMENDATIONS  PT Discharge Recommendations: Acute rehabilitation    PLAN  PT Treatment Plan: Endurance; Energy conservation;Patient education;Gait training;Balance training;Transfer training  Rehab Potential : Good  Frequency (Obs): 5x/week       PHYSICAL THERAPY MEDICAL/SOCIAL HISTORY        Problem List  Principal Problem:    Chronic subdural hematoma (HCC)  Active Problems:    CKD (chronic kidney disease) stage 4, GFR 15-29 ml/min (HCC)    Anemia in stage 4 chronic kidney disease       HOME SITUATION  Home Situation  Type of Home: House  Home Layout: Two level  Stairs to Bedroom: 16  Railing: Yes  Lives With: Spouse  Drives: No  Patient Owned Equipment: None  Patient Regularly Uses: None     Prior Level of [place]: Pt reports he lives with spouse in two level home, did not use assistive device pta, he states that he went to [facility] following his TBI 6/2023, and just prior to admit had been going to day rehab, pt reports 3 hours/day, 3 days/week. Pt reports he is a retired . PHYSICAL THERAPY EXAMINATION     OBJECTIVE  Precautions: Bed/chair alarm;Seizure  Fall Risk: High fall risk    WEIGHT BEARING RESTRICTION                PAIN ASSESSMENT  Rating: Unable to rate  Location: surgical  Management Techniques:  Body mechanics    COGNITION  Orientation Level:  oriented to situation and oriented to person  Following Commands:  follows one step commands consistently  Safety Judgement:  decreased awareness of need for assistance and decreased awareness of need for safety  Awareness of Errors:  decreased awareness of errors   Awareness of Deficits:  not aware of deficits    RANGE OF MOTION AND STRENGTH ASSESSMENT  Upper extremity ROM and strength are within functional limits   Lower extremity ROM is within functional limits   Lower extremity strength is within functional limits     BALANCE  Static Sitting: Good  Dynamic Sitting: Fair  Static Standing: Poor +  Dynamic Standing: Poor      NEUROLOGICAL FINDINGS     Coordination - Heel to [place]: Left decreased speed;Left decreased accuracy; Right decreased speed;Right decreased accuracy                ACTIVITY TOLERANCE  Pulse: 84  Heart Rate Source: Monitor     BP: 145/66  BP Location: Right arm  BP Method: Automatic  Patient Position: Lying    O2 WALK  Oxygen Therapy  SPO2% on Room Air at Rest: 95    AM-PAC '6-Clicks' INPATIENT SHORT FORM - BASIC MOBILITY  How much difficulty does the patient currently have. .. Patient Difficulty: Turning over in bed (including adjusting bedclothes, sheets and blankets)?: A Little   Patient Difficulty: Sitting down on and standing up from a chair with arms (e.g., wheelchair, bedside commode, etc.): A Little   Patient Difficulty: Moving from lying on back to sitting on the side of the bed?: A Little   How much help from another person does the patient currently need. ..    Help from Another: Moving to and from a bed to a chair (including a wheelchair)?: A Little   Help from Another: Need to walk in hospital room?: A Lot   Help from Another: Climbing 3-5 steps with a railing?: A Lot     AM-PAC Score:  Raw Score: 16   Approx Degree of Impairment: 54.16%   Standardized Score (AM-PAC Scale): 40.78   CMS Modifier (G-Code): CK    FUNCTIONAL ABILITY STATUS  Functional Mobility/Gait Assessment  Gait Assistance: Moderate assistance  Distance (ft): 10  Assistive Device: Other (Comment) (bilat hand held assist)  Pattern: Ataxic (narrow THIEN)      Exercise/Education Provided:  Bed mobility  Energy conservation  Functional activity tolerated  Gait training  Posture    Patient End of Session: In bed;Needs met;Call light within reach;RN aware of session/findings; All patient questions and concerns addressed; Alarm set (all 4 bed rails up with bed alarm per rn who was present)    CURRENT GOALS    Goals to be met by: 8/31/23  Patient Goal Patient's self-stated goal is: pt in agreement with Acute rehab   Goal #1 Patient is able to demonstrate supine - sit EOB @ level: supervision     Goal #1   Current Status    Goal #2 Patient is able to demonstrate transfers Sit to/from Stand at assistance level: supervision with walker - rolling     Goal #2  Current Status    Goal #3 Patient is able to ambulate 100 feet with assist device: walker - rolling at assistance level: minimum assistance   Goal #3   Current Status    Goal #4 Patient will negotiate  stairs/one curb w/ assistive device and supervision   Goal #4   Current Status    Goal #5 Patient to demonstrate independence with home activity/exercise instructions provided to patient in preparation for discharge.    Goal #5   Current Status    Goal #6    Goal #6  Current Status    Patient Evaluation Complexity Level:  History Moderate - 1 or 2 personal factors and/or co-morbidities   Examination of body systems Moderate - addressing a total of 3 or more elements   Clinical Presentation Moderate - Evolving   Clinical Decision Making Moderate Complexity       Gait Training: 10 minutes  Therapeutic Activity: 12 minutes

## 2023-08-24 LAB
ANION GAP SERPL CALC-SCNC: 4 MMOL/L (ref 0–18)
BASOPHILS # BLD AUTO: 0.04 X10(3) UL (ref 0–0.2)
BASOPHILS NFR BLD AUTO: 0.4 %
BUN BLD-MCNC: 58 MG/DL (ref 7–18)
BUN/CREAT SERPL: 16.8 (ref 10–20)
CALCIUM BLD-MCNC: 7.7 MG/DL (ref 8.5–10.1)
CHLORIDE SERPL-SCNC: 115 MMOL/L (ref 98–112)
CO2 SERPL-SCNC: 21 MMOL/L (ref 21–32)
CREAT BLD-MCNC: 3.46 MG/DL
DEPRECATED RDW RBC AUTO: 56.6 FL (ref 35.1–46.3)
EGFRCR SERPLBLD CKD-EPI 2021: 18 ML/MIN/1.73M2 (ref 60–?)
EOSINOPHIL # BLD AUTO: 0.22 X10(3) UL (ref 0–0.7)
EOSINOPHIL NFR BLD AUTO: 2.2 %
ERYTHROCYTE [DISTWIDTH] IN BLOOD BY AUTOMATED COUNT: 17.1 % (ref 11–15)
GLUCOSE BLD-MCNC: 93 MG/DL (ref 70–99)
GLUCOSE BLDC GLUCOMTR-MCNC: 144 MG/DL (ref 70–99)
GLUCOSE BLDC GLUCOMTR-MCNC: 183 MG/DL (ref 70–99)
GLUCOSE BLDC GLUCOMTR-MCNC: 188 MG/DL (ref 70–99)
GLUCOSE BLDC GLUCOMTR-MCNC: 76 MG/DL (ref 70–99)
HCT VFR BLD AUTO: 28 %
HGB BLD-MCNC: 8.8 G/DL
IMM GRANULOCYTES # BLD AUTO: 0.04 X10(3) UL (ref 0–1)
IMM GRANULOCYTES NFR BLD: 0.4 %
LYMPHOCYTES # BLD AUTO: 1.24 X10(3) UL (ref 1–4)
LYMPHOCYTES NFR BLD AUTO: 12.5 %
MCH RBC QN AUTO: 28.6 PG (ref 26–34)
MCHC RBC AUTO-ENTMCNC: 31.4 G/DL (ref 31–37)
MCV RBC AUTO: 90.9 FL
MONOCYTES # BLD AUTO: 0.97 X10(3) UL (ref 0.1–1)
MONOCYTES NFR BLD AUTO: 9.8 %
NEUTROPHILS # BLD AUTO: 7.4 X10 (3) UL (ref 1.5–7.7)
NEUTROPHILS # BLD AUTO: 7.4 X10(3) UL (ref 1.5–7.7)
NEUTROPHILS NFR BLD AUTO: 74.7 %
OSMOLALITY SERPL CALC.SUM OF ELEC: 306 MOSM/KG (ref 275–295)
PLATELET # BLD AUTO: 220 10(3)UL (ref 150–450)
POTASSIUM SERPL-SCNC: 4.7 MMOL/L (ref 3.5–5.1)
RBC # BLD AUTO: 3.08 X10(6)UL
SODIUM SERPL-SCNC: 140 MMOL/L (ref 136–145)
WBC # BLD AUTO: 9.9 X10(3) UL (ref 4–11)

## 2023-08-24 PROCEDURE — 99233 SBSQ HOSP IP/OBS HIGH 50: CPT | Performed by: INTERNAL MEDICINE

## 2023-08-24 RX ORDER — AMIODARONE HYDROCHLORIDE 200 MG/1
200 TABLET ORAL DAILY
Status: DISCONTINUED | OUTPATIENT
Start: 2023-08-26 | End: 2023-08-26

## 2023-08-24 RX ORDER — IBUPROFEN 200 MG
CAPSULE ORAL 2 TIMES DAILY
Status: DISCONTINUED | OUTPATIENT
Start: 2023-08-24 | End: 2023-08-26

## 2023-08-24 RX ORDER — AMLODIPINE BESYLATE 10 MG/1
10 TABLET ORAL DAILY
Status: DISCONTINUED | OUTPATIENT
Start: 2023-08-25 | End: 2023-08-26

## 2023-08-24 RX ORDER — SODIUM CHLORIDE 9 MG/ML
INJECTION, SOLUTION INTRAVENOUS CONTINUOUS
Status: DISCONTINUED | OUTPATIENT
Start: 2023-08-24 | End: 2023-08-26

## 2023-08-24 NOTE — CM/SW NOTE
Abi Plaza was previously at [facility]. PT is recommending acute rehab. Daughter prefers [facility] for [employer]. Referrals started in [place]. Will need PMR order to be placed.     Sigifredo JAYA BSN RN [address]  RN Case Manager  [phone]

## 2023-08-24 NOTE — PROGRESS NOTES
[place] [facility] - [facility]     Hospitalist Progress Note     Dallas Norman Patient Status:  Inpatient    3/21/1950 MRN [id number]   Location [facility] 2W/SW Attending Sal Kirkpatrick MD   Hosp Day # 7 PCP No primary care provider on file. Chief Complaint: Patient presents with:  Edema  Altered Mental Status  Weakness       Subjective:     Patient seen lying in bed. Family at bedside. Patient denies acute events overnight. Patient reports Denies new weakness, headaches or vision changes. Denies further diarrhea    Objective:      Vital signs:   23  0800 23  0858 23  0900 23  1000   BP: 128/58 154/61 154/61 141/72   BP Location:       Pulse: 69 83 81 96   Resp: 13  18 25   Temp:       TempSrc:       SpO2: 97%  93% 97%   Weight:       Height:           Intake/Output Summary (Last 24 hours) at 2023 1113  Last data filed at 2023 0930  Gross per 24 hour   Intake 1399 ml   Output 1255 ml   Net 144 ml             Physical Exam:    GENERAL:  Awake and alert, in no acute distress. HEENT: drain site dressing in place  HEART:  S1 and S2 heard. RRR   LUNGS:  Air entry was good. No crackles or wheezes   ABDOMEN: Soft and non-tender.     PSYCHIATRIC: Normal mood    Diagnostic Data:    Labs:    Recent Labs   Lab 23  0341 23  0421 23  0343   WBC 11.5* 9.5 9.9   HGB 9.4* 8.6* 8.8*   MCV 92.5 93.0 90.9   .0 212.0 220.0         Recent Labs   Lab 23  0519 23  0550 23  0341 23  0421 23  0343   *   < > 222* 103* 93   BUN 51*   < > 50* 54* 58*   CREATSERUM 2.99*   < > 3.11* 3.30* 3.46*   CA 7.3*   < > 7.4* 7.5* 7.7*   ALB 2.5*  --   --   --   --       < > 141 141 140   K 5.1   < > 4.8 4.8 4.7   *   < > 114* 115* 115*   CO2 20.0*   < > 21.0 21.0 21.0   ALKPHO 49  --   --   --   --    AST 16  --   --   --   --    ALT 14*  --   --   --   --    BILT 0.2  --   --   --   --    TP 5.7*  --   --   --   --     < > = values in this interval not displayed. Estimated Creatinine Clearance: 14.5 mL/min (A) (based on SCr of 3.46 mg/dL (H)). No results for input(s): PTP, INR in the last 168 hours. COVID-19  No results found for: COVID19    Pro-Calcitonin  No results for input(s): PCT in the last 168 hours. Cardiac  No results for input(s): TROP, PBNP in the last 168 hours. Inflammatory Markers  No results for input(s): CRP, AMADOR, LDH, DDIMER in the last 168 hours. Culture:  No results found for this visit on 08/16/23. XR CHEST AP PORTABLE  (CPT=71045)    Result Date: 8/23/2023  CONCLUSION: Clear lungs    Dictated by (CST): Lanette Hill MD on 8/23/2023 at 7:14 AM     Finalized by (CST): Lanette Hill MD on 8/23/2023 at 7:16 AM                 Medications:    neomycin-bacitracin-polymyxin   Topical BID    [START ON 8/26/2023] amiodarone  200 mg Oral Daily    vancomycin  125 mg Oral QID    insulin detemir  6 Units Subcutaneous Noon    acetaminophen  1,000 mg Oral q6h    levETIRAcetam  500 mg Oral BID    calcitriol  0.25 mcg Oral Daily    amiodarone  400 mg Oral BID with meals    amLODIPine  5 mg Oral Daily    hydrALAZINE  10 mg Oral Q8H [address]    epoetin manasa  5,000 Units Subcutaneous Once per day on Mon Wed Fri    insulin aspart  1-7 Units Subcutaneous TID CC       Assessment & Plan:      Acute on chronic subdural hematoma ([facility] [address]  -recent traumatic brain injury and episode of expressive aphasia  -Neurosurgery surgery consulted, s/p b/l craniotomy with drain placement on 8/17.   -appreciate further neurosurgery recs.    -repeat CT head (-)   -Continue avoiding anticoagulation  -Continue BP control.    -drain removed per neurosurgery.   -Continue Keppra for seizure prophylaxis   -PT/OT as allowed per neurosurgery     New onset Afib with RVR  -In setting of acute illness as above  -Spontaneous converted to sinus rhythm  -currently remains in sinus rhythm  -Continue on amiodarone  -Neurology on consult, appreciate further recommendations  -will monitor closely     Acute renal failure on CKD (chronic kidney disease) stage 4, GFR 15-29 ml/min (HCC)  -Creatinine with mild increase noted  - Avoiding Nephrotoxic agents  -Nephrology on consult, appreciate further recommendations  - Cont to monitor     Cdiff Diarrhea  -cdiff positive  -Diarrhea resolving  -Continue PO Vancomycin QID     Hyperglycemia  - Monitoring Blood glucose with POC checks  - SSI to cover hyperglycemia  - Hypoglycemia protocol  - Will monitor and adjust agents as needed. Anemia due to CKD  -hgb is stable. -Cont EPO per Nephrology  -will monitor. Plan of care discussed with patient and family at bedside. Discussed plan with 2nd daughter via telephone. Discussed management/test result(s) with RN    Quality:  DVT Prophylaxis: SCDs  CODE status: Full  Estimated date of discharge: TBD  Discharge is dependent on: clinical stability    54 minutes spent discussing with other providers, examining patient, obtaining history, reviewing medical records, interpreting and communicating test results/imaging, ordering tests/medications, discussing plan of care and documenting information. Gabriela Jeffries MD              This note was prepared using [employer] voice recognition dictation software. As a result errors may occur. When identified these errors have been corrected.  While every attempt is made to correct errors during dictation discrepancies may still exist

## 2023-08-24 NOTE — CM/SW NOTE
Met with patient, daughter Latasha Grimes, and daughter Jenny Baca was on speaker phone. Family and patient very interested in [place] consult pending. Per Coirna Estevez, patient will need B LE US if accepted. SW to f/u pending PMR recs.     Franklyn Favre, [address]

## 2023-08-24 NOTE — PROGRESS NOTES
[facility] Pharmacy Note: Route Optimization for Acetaminophen (OFIRMEV)    Patient is currently on Acetaminophen (OFIRMEV) 1000 mg IV every 6 hours. The patient meets the criteria to convert to the oral equivalent as established by the IV to Oral conversion protocol approved by the P&T committee. Medication was changed from IV formulation to Acetaminophen 1000 mg PO every 6 hours per protocol. [facility] Pharmacy Note: Route Optimization for Levetiracetam (KEPPRA)    Patient is currently on Levetiracetam (KEPPRA) 500 mg IV every 12 hours. The patient meets the criteria to convert to the oral equivalent as established by the IV to Oral conversion protocol approved by the P&T committee. Medication was changed from IV formulation to Levetiracetam (KEPPRA)  500 mg PO every 12 hours per protocol.       José Bro, PharmD  8/23/2023,  7:03 PM

## 2023-08-25 ENCOUNTER — APPOINTMENT (OUTPATIENT)
Dept: ULTRASOUND IMAGING | Facility: HOSPITAL | Age: 73
End: 2023-08-25
Attending: STUDENT IN AN ORGANIZED HEALTH CARE EDUCATION/TRAINING PROGRAM
Payer: MEDICARE

## 2023-08-25 LAB
ANION GAP SERPL CALC-SCNC: 7 MMOL/L (ref 0–18)
BASOPHILS # BLD AUTO: 0.03 X10(3) UL (ref 0–0.2)
BASOPHILS NFR BLD AUTO: 0.3 %
BUN BLD-MCNC: 60 MG/DL (ref 7–18)
BUN/CREAT SERPL: 16.7 (ref 10–20)
CALCIUM BLD-MCNC: 7.9 MG/DL (ref 8.5–10.1)
CHLORIDE SERPL-SCNC: 113 MMOL/L (ref 98–112)
CO2 SERPL-SCNC: 19 MMOL/L (ref 21–32)
CREAT BLD-MCNC: 3.6 MG/DL
DEPRECATED RDW RBC AUTO: 56.9 FL (ref 35.1–46.3)
EGFRCR SERPLBLD CKD-EPI 2021: 17 ML/MIN/1.73M2 (ref 60–?)
EOSINOPHIL # BLD AUTO: 0.16 X10(3) UL (ref 0–0.7)
EOSINOPHIL NFR BLD AUTO: 1.6 %
ERYTHROCYTE [DISTWIDTH] IN BLOOD BY AUTOMATED COUNT: 16.9 % (ref 11–15)
GLUCOSE BLD-MCNC: 130 MG/DL (ref 70–99)
GLUCOSE BLDC GLUCOMTR-MCNC: 164 MG/DL (ref 70–99)
GLUCOSE BLDC GLUCOMTR-MCNC: 171 MG/DL (ref 70–99)
GLUCOSE BLDC GLUCOMTR-MCNC: 179 MG/DL (ref 70–99)
GLUCOSE BLDC GLUCOMTR-MCNC: 181 MG/DL (ref 70–99)
HCT VFR BLD AUTO: 27.6 %
HGB BLD-MCNC: 8.8 G/DL
IMM GRANULOCYTES # BLD AUTO: 0.04 X10(3) UL (ref 0–1)
IMM GRANULOCYTES NFR BLD: 0.4 %
LYMPHOCYTES # BLD AUTO: 1.38 X10(3) UL (ref 1–4)
LYMPHOCYTES NFR BLD AUTO: 13.8 %
MCH RBC QN AUTO: 29.2 PG (ref 26–34)
MCHC RBC AUTO-ENTMCNC: 31.9 G/DL (ref 31–37)
MCV RBC AUTO: 91.7 FL
MONOCYTES # BLD AUTO: 0.88 X10(3) UL (ref 0.1–1)
MONOCYTES NFR BLD AUTO: 8.8 %
NEUTROPHILS # BLD AUTO: 7.54 X10 (3) UL (ref 1.5–7.7)
NEUTROPHILS # BLD AUTO: 7.54 X10(3) UL (ref 1.5–7.7)
NEUTROPHILS NFR BLD AUTO: 75.1 %
OSMOLALITY SERPL CALC.SUM OF ELEC: 307 MOSM/KG (ref 275–295)
PLATELET # BLD AUTO: 288 10(3)UL (ref 150–450)
POTASSIUM SERPL-SCNC: 4.7 MMOL/L (ref 3.5–5.1)
RBC # BLD AUTO: 3.01 X10(6)UL
SODIUM SERPL-SCNC: 139 MMOL/L (ref 136–145)
WBC # BLD AUTO: 10 X10(3) UL (ref 4–11)

## 2023-08-25 PROCEDURE — 93970 EXTREMITY STUDY: CPT | Performed by: STUDENT IN AN ORGANIZED HEALTH CARE EDUCATION/TRAINING PROGRAM

## 2023-08-25 PROCEDURE — 99232 SBSQ HOSP IP/OBS MODERATE 35: CPT | Performed by: INTERNAL MEDICINE

## 2023-08-25 PROCEDURE — 99233 SBSQ HOSP IP/OBS HIGH 50: CPT | Performed by: INTERNAL MEDICINE

## 2023-08-25 RX ORDER — ACETAMINOPHEN 500 MG
500 TABLET ORAL EVERY 4 HOURS PRN
Qty: 120 TABLET | Refills: 0 | Status: SHIPPED | OUTPATIENT
Start: 2023-08-25

## 2023-08-25 RX ORDER — NALOXONE HYDROCHLORIDE 4 MG/.1ML
4 SPRAY NASAL AS NEEDED
Qty: 1 KIT | Refills: 0 | Status: SHIPPED | OUTPATIENT
Start: 2023-08-25

## 2023-08-25 RX ORDER — HYDRALAZINE HYDROCHLORIDE 50 MG/1
50 TABLET, FILM COATED ORAL EVERY 8 HOURS SCHEDULED
Status: DISCONTINUED | OUTPATIENT
Start: 2023-08-25 | End: 2023-08-26

## 2023-08-25 RX ORDER — OXYCODONE HYDROCHLORIDE 5 MG/1
5 TABLET ORAL EVERY 4 HOURS PRN
Qty: 18 TABLET | Refills: 0 | Status: SHIPPED | OUTPATIENT
Start: 2023-08-25

## 2023-08-25 NOTE — PLAN OF CARE
Pt remains resting in bed, alert and oriented. Q4 neuros done throughout the night. Scheduled tylenol continued throughout the night. Frequent rounds made for patient safety. Bed locked, call light within reach.        Problem: Patient Centered Care  Goal: Patient preferences are identified and integrated in the patient's plan of care  Description: Interventions:  - What would you like us to know as we care for you?   - Provide timely, complete, and accurate information to patient/family  - Incorporate patient and family knowledge, values, beliefs, and cultural backgrounds into the planning and delivery of care  - Encourage patient/family to participate in care and decision-making at the level they choose  - Honor patient and family perspectives and choices  Outcome: Progressing     Problem: Patient/Family Goals  Goal: Patient/Family Long Term Goal  Description: Patient's Long Term Goal: to be discharged home    Interventions:  - See additional Care Plan goals for specific interventions  Outcome: Progressing  Goal: Patient/Family Short Term Goal  Description: Patient's Short Term Goal: to have less pain    Interventions:   - Monitor/assess pain Q4HR and PRN  - Administer pain medications, provide teaching on nonpharmacologic pain methods, relaxation techniques  - See additional Care Plan goals for specific interventions  Outcome: Progressing     Problem: Diabetes/Glucose Control  Goal: Glucose maintained within prescribed range  Description: INTERVENTIONS:  - Monitor Blood Glucose as ordered  - Assess for signs and symptoms of hyperglycemia and hypoglycemia  - Administer ordered medications to maintain glucose within target range  - Assess barriers to adequate nutritional intake and initiate nutrition consult as needed  - Instruct patient on self management of diabetes  Outcome: Progressing     Problem: RISK FOR INFECTION - ADULT  Goal: Absence of fever/infection during anticipated neutropenic period  Description: INTERVENTIONS  - Monitor WBC  - Administer growth factors as ordered  - Implement neutropenic guidelines  Outcome: Progressing     Problem: DISCHARGE PLANNING  Goal: Discharge to home or other facility with appropriate resources  Description: INTERVENTIONS:  - Identify barriers to discharge w/pt and caregiver  - Include patient/family/discharge partner in discharge planning  - Arrange for needed discharge resources and transportation as appropriate  - Identify discharge learning needs (meds, wound care, etc)  - Arrange for interpreters to assist at discharge as needed  - Consider post-discharge preferences of patient/family/discharge partner  - Complete POLST form as appropriate  - Assess patient's ability to be responsible for managing their own health  - Refer to Case Management Department for coordinating discharge planning if the patient needs post-hospital services based on physician/LIP order or complex needs related to functional status, cognitive ability or social support system  Outcome: Progressing     Problem: Altered Communication/Language Barrier  Goal: Patient/Family is able to understand and participate in their care  Description: Interventions:  - Assess communication ability and preferred communication style  - Implement communication aides and strategies  - Use visual cues when possible  - Listen attentively, be patient, do not interrupt  - Minimize distractions  - Allow time for understanding and response  - Establish method for patient to ask for assistance (call light)  - Provide an  as needed  - Communicate barriers and strategies to overcome with those who interact with patient  Outcome: Progressing     Problem: GASTROINTESTINAL - ADULT  Goal: Minimal or absence of nausea and vomiting  Description: INTERVENTIONS:  - Maintain adequate hydration with IV or PO as ordered and tolerated  - Nasogastric tube to low intermittent suction as ordered  - Evaluate effectiveness of ordered antiemetic medications  - Provide nonpharmacologic comfort measures as appropriate  - Advance diet as tolerated, if ordered  - Obtain nutritional consult as needed  - Evaluate fluid balance  Outcome: Progressing  Goal: Maintains or returns to baseline bowel function  Description: INTERVENTIONS:  - Assess bowel function  - Maintain adequate hydration with IV or PO as ordered and tolerated  - Evaluate effectiveness of GI medications  - Encourage mobilization and activity  - Obtain nutritional consult as needed  - Establish a toileting routine/schedule  - Consider collaborating with pharmacy to review patient's medication profile  Outcome: Progressing     Problem: SKIN/TISSUE INTEGRITY - ADULT  Goal: Skin integrity remains intact  Description: INTERVENTIONS  - Assess and document risk factors for pressure ulcer development  - Assess and document skin integrity  - Monitor for areas of redness and/or skin breakdown  - Initiate interventions, skin care algorithm/standards of care as needed  Outcome: Progressing  Goal: Incision(s), wounds(s) or drain site(s) healing without S/S of infection  Description: INTERVENTIONS:  - Assess and document risk factors for pressure ulcer development  - Assess and document skin integrity  - Assess and document dressing/incision, wound bed, drain sites and surrounding tissue  - Implement wound care per orders  - Initiate isolation precautions as appropriate  - Initiate Pressure Ulcer prevention bundle as indicated  Outcome: Progressing

## 2023-08-25 NOTE — PROGRESS NOTES
[facility]    Progress Note    Dallas Norman Patient Status:  Inpatient    3/21/1950 MRN [id number]   Location [facility] 2W/SW Attending Sal Kirkpatrick MD   Hosp Day # 7 PCP No primary care provider on file.        Subjective:   Dallas Norman is a(n) 75year old male     ROS:     Constitutional: Feels about the same  ENMT:  Negative for ear drainage, hearing loss and nasal drainage  Eyes:  Negative for eye discharge and vision loss  Cardiovascular:  Negative for chest pain, sob, irregular heartbeat/palpitations  Respiratory:  Negative for cough, dyspnea and wheezing  Gastrointestinal:  Negative for abdominal pain, constipation, decreased appetite, diarrhea and vomiting  Genitourinary:  Negative for dysuria and hematuria  Endocrine:  Negative for abnormal sleep patterns, increased activity, polydipsia and polyphagia  Hema/Lymph:  Negative for easy bleeding and easy bruising  Integumentary:  Negative for pruritus and rash  Musculoskeletal:  Negative for bone/joint symptoms  Neurological:  Negative for gait disturbance  Psychiatric:  Negative for inappropriate interaction and psychiatric symptoms       23  1800   BP: 133/60   Pulse: 61   Resp: 22   Temp:            PHYSICAL EXAM:   Constitutional: appears well hydrated alert and responsive no acute distress noted  Head/Face: normocephalic  Eyes/Vision: normal extraocular motion is intact  Nose/Mouth/Throat:mucous membranes are moist and no oral lesions are noted  Neck/Thyroid: neck is supple without adenopathy  Lymphatic: no abnormal cervical, supraclavicular adenopathy is noted  Respiratory:  lungs are clear to auscultation bilaterally, normal respiratory effort  Cardiovascular: regular rate and rhythm no murmurs, gallups, or rubs  Abdomen: soft, non-tender, non-distended, BS normal  Vascular: well perfused femoral, and pedal pulses normal  Skin/Hair: no unusual rashes present, no abnormal bruising noted  Back/Spine: no abnormalities noted  Musculoskeletal: full ROM all extremities good strength  no deformities  Extremities: no edema, cyanosis  Neurological:  Grossly normal    Results:     Laboratory Data:  Lab Results   Component Value Date    WBC 9.9 08/24/2023    HGB 8.8 (L) 08/24/2023    HCT 28.0 (L) 08/24/2023    .0 08/24/2023    CREATSERUM 3.46 (H) 08/24/2023    BUN 58 (H) 08/24/2023     08/24/2023    K 4.7 08/24/2023     (H) 08/24/2023    CO2 21.0 08/24/2023    GLU 93 08/24/2023    CA 7.7 (L) 08/24/2023    ALB 2.5 (L) 08/18/2023    ALKPHO 49 08/18/2023    BILT 0.2 08/18/2023    TP 5.7 (L) 08/18/2023    AST 16 08/18/2023    ALT 14 (L) 08/18/2023    PTT 29.9 08/16/2023    INR 0.99 08/16/2023    MG 2.7 (H) 08/23/2023    PHOS 2.7 08/23/2023       Imaging:  [unfilled]   XR CHEST AP PORTABLE  (CPT=71045)    Result Date: 8/23/2023  CONCLUSION: Clear lungs    Dictated by (CST): Lloyd Go MD on 8/23/2023 at 7:14 AM     Finalized by (CST): Lloyd Go MD on 8/23/2023 at 7:16 AM             ASSESSMENT/PLAN:   Assessment       CKD 4 creatinine a little bit  Will start a little bit IV fluids    #2 subdural bleed stable doing better    3. On Epogen  #4 C. difficile on oral Vanco         8/24/2023  Yong Lundberg MD

## 2023-08-25 NOTE — CM/SW NOTE
1508:  Fariba venous dopplers are neg. [facility] plan as requested by [facility] completed. Awaiting bed availability at [facility] will need to be off IV meds prior to transfer.  left for daughter Sergei Olmstead. CM will be here this weekend and will be working with [employer] on bed availability. PMR is recommending acute. CM spoke with Mikayla from [facility], facility will need to know [facility] plan prior to transfer. If remains off of AC, they are requesting fariba LE venous dopplers. IP attending, RN and Neurosurgeon notified of this request.    PLAN:  Will transfer to [facility] at [facility] for acute rehabilitation once medically cleared. / to remain available for support and/or discharge planning.       Lonzie Sandhoff MBA BSN RN [address]  RN Case Manager  [phone]

## 2023-08-25 NOTE — PROGRESS NOTES
[facility]    Progress Note    Johnna Keith Patient Status:  Inpatient    3/21/1950 MRN [id number]   Location [facility] 2W/SW Attending Eleazar Jefferson MD   Hosp Day # 8 PCP No primary care provider on file.        Subjective:   Johnna Keith is a(n) 75year old male     ROS:     Constitutional:  Negative for decreased activity, fever, irritability and lethargy  ENMT:  Negative for ear drainage, hearing loss and nasal drainage  Eyes:  Negative for eye discharge and vision loss  Cardiovascular:  Negative for chest pain, sob, irregular heartbeat/palpitations  Respiratory:  Negative for cough, dyspnea and wheezing  Gastrointestinal:  Negative for abdominal pain, constipation, decreased appetite, diarrhea and vomiting  Genitourinary:  Negative for dysuria and hematuria  Endocrine:  Negative for abnormal sleep patterns, increased activity, polydipsia and polyphagia  Hema/Lymph:  Negative for easy bleeding and easy bruising  Integumentary:  Negative for pruritus and rash  Musculoskeletal:  Negative for bone/joint symptoms  Neurological:  Negative for gait disturbance  Psychiatric:  Negative for inappropriate interaction and psychiatric symptoms       23  1500   BP: 143/59   Pulse: 75   Resp: 18   Temp:            PHYSICAL EXAM:   Constitutional: appears well hydrated alert and responsive no acute distress noted  Head/Face: normocephalic  Eyes/Vision: normal extraocular motion is intact  Nose/Mouth/Throat:mucous membranes are moist and no oral lesions are noted  Neck/Thyroid: neck is supple without adenopathy  Lymphatic: no abnormal cervical, supraclavicular adenopathy is noted  Respiratory:  lungs are clear to auscultation bilaterally, normal respiratory effort  Cardiovascular: regular rate and rhythm no murmurs, gallups, or rubs  Abdomen: soft, non-tender, non-distended, BS normal  Vascular: well perfused femoral, and pedal pulses normal  Skin/Hair: no unusual rashes present, no abnormal bruising noted  Back/Spine: no abnormalities noted  Musculoskeletal: full ROM all extremities good strength  no deformities  Extremities: no edema, cyanosis  Neurological:  Grossly normal    Results:     Laboratory Data:  Lab Results   Component Value Date    WBC 10.0 08/25/2023    HGB 8.8 (L) 08/25/2023    HCT 27.6 (L) 08/25/2023    .0 08/25/2023    CREATSERUM 3.60 (H) 08/25/2023    BUN 60 (H) 08/25/2023     08/25/2023    K 4.7 08/25/2023     (H) 08/25/2023    CO2 19.0 (L) 08/25/2023     (H) 08/25/2023    CA 7.9 (L) 08/25/2023    ALB 2.5 (L) 08/18/2023    ALKPHO 49 08/18/2023    BILT 0.2 08/18/2023    TP 5.7 (L) 08/18/2023    AST 16 08/18/2023    ALT 14 (L) 08/18/2023    PTT 29.9 08/16/2023    INR 0.99 08/16/2023    MG 2.7 (H) 08/23/2023    PHOS 2.7 08/23/2023       Imaging:  [unfilled]    VENOUS DOPPLER LEG BILAT - DIAG IMG (CPT=93970)    Result Date: 8/25/2023  CONCLUSION:  1. Normal bilateral leg venous duplex exam. 2. No deep venous thrombosis    Dictated by (CST): Jaspreet Garcia MD on 8/25/2023 at 10:40 AM     Finalized by (CST): Jaspreet Garcia MD on 8/25/2023 at 10:42 AM             ASSESSMENT/PLAN:   Assessment       #1 subdural bleed doing okay neurologically    #2 CKD 4 creatinine is still up a little bit on IV fluids we will check bladder scan  #3 anemia on Epogen    #4 weaknes hopefully going to acute rehab         8/25/2023  Yong Pride MD

## 2023-08-26 VITALS
HEIGHT: 62 IN | SYSTOLIC BLOOD PRESSURE: 118 MMHG | HEART RATE: 54 BPM | DIASTOLIC BLOOD PRESSURE: 46 MMHG | WEIGHT: 192.44 LBS | TEMPERATURE: 99 F | OXYGEN SATURATION: 87 % | RESPIRATION RATE: 15 BRPM | BODY MASS INDEX: 35.41 KG/M2

## 2023-08-26 LAB
ANION GAP SERPL CALC-SCNC: 6 MMOL/L (ref 0–18)
BASOPHILS # BLD AUTO: 0.02 X10(3) UL (ref 0–0.2)
BASOPHILS NFR BLD AUTO: 0.2 %
BUN BLD-MCNC: 58 MG/DL (ref 7–18)
BUN/CREAT SERPL: 16.7 (ref 10–20)
CALCIUM BLD-MCNC: 7.7 MG/DL (ref 8.5–10.1)
CHLORIDE SERPL-SCNC: 113 MMOL/L (ref 98–112)
CO2 SERPL-SCNC: 20 MMOL/L (ref 21–32)
CREAT BLD-MCNC: 3.47 MG/DL
DEPRECATED RDW RBC AUTO: 57.6 FL (ref 35.1–46.3)
EGFRCR SERPLBLD CKD-EPI 2021: 18 ML/MIN/1.73M2 (ref 60–?)
EOSINOPHIL # BLD AUTO: 0.15 X10(3) UL (ref 0–0.7)
EOSINOPHIL NFR BLD AUTO: 1.5 %
ERYTHROCYTE [DISTWIDTH] IN BLOOD BY AUTOMATED COUNT: 17.1 % (ref 11–15)
GLUCOSE BLD-MCNC: 130 MG/DL (ref 70–99)
GLUCOSE BLDC GLUCOMTR-MCNC: 117 MG/DL (ref 70–99)
HCT VFR BLD AUTO: 29.5 %
HGB BLD-MCNC: 9.3 G/DL
IMM GRANULOCYTES # BLD AUTO: 0.05 X10(3) UL (ref 0–1)
IMM GRANULOCYTES NFR BLD: 0.5 %
LYMPHOCYTES # BLD AUTO: 1.43 X10(3) UL (ref 1–4)
LYMPHOCYTES NFR BLD AUTO: 14.3 %
MCH RBC QN AUTO: 29 PG (ref 26–34)
MCHC RBC AUTO-ENTMCNC: 31.5 G/DL (ref 31–37)
MCV RBC AUTO: 91.9 FL
MONOCYTES # BLD AUTO: 0.78 X10(3) UL (ref 0.1–1)
MONOCYTES NFR BLD AUTO: 7.8 %
NEUTROPHILS # BLD AUTO: 7.56 X10 (3) UL (ref 1.5–7.7)
NEUTROPHILS # BLD AUTO: 7.56 X10(3) UL (ref 1.5–7.7)
NEUTROPHILS NFR BLD AUTO: 75.7 %
OSMOLALITY SERPL CALC.SUM OF ELEC: 306 MOSM/KG (ref 275–295)
PLATELET # BLD AUTO: 314 10(3)UL (ref 150–450)
POTASSIUM SERPL-SCNC: 4.6 MMOL/L (ref 3.5–5.1)
RBC # BLD AUTO: 3.21 X10(6)UL
SODIUM SERPL-SCNC: 139 MMOL/L (ref 136–145)
WBC # BLD AUTO: 10 X10(3) UL (ref 4–11)

## 2023-08-26 PROCEDURE — 99232 SBSQ HOSP IP/OBS MODERATE 35: CPT | Performed by: INTERNAL MEDICINE

## 2023-08-26 PROCEDURE — 99239 HOSP IP/OBS DSCHRG MGMT >30: CPT | Performed by: INTERNAL MEDICINE

## 2023-08-26 RX ORDER — AMLODIPINE BESYLATE 10 MG/1
10 TABLET ORAL DAILY
Qty: 30 TABLET | Refills: 0 | Status: SHIPPED | OUTPATIENT
Start: 2023-08-27

## 2023-08-26 RX ORDER — LEVETIRACETAM 500 MG/1
500 TABLET ORAL 2 TIMES DAILY
Qty: 60 TABLET | Refills: 0 | Status: SHIPPED | OUTPATIENT
Start: 2023-08-26 | End: 2023-09-25

## 2023-08-26 RX ORDER — AMIODARONE HYDROCHLORIDE 200 MG/1
200 TABLET ORAL DAILY
Qty: 14 TABLET | Refills: 0 | Status: SHIPPED | OUTPATIENT
Start: 2023-08-27 | End: 2023-09-10

## 2023-08-26 RX ORDER — HYDRALAZINE HYDROCHLORIDE 50 MG/1
50 TABLET, FILM COATED ORAL EVERY 8 HOURS SCHEDULED
Qty: 90 TABLET | Refills: 0 | Status: SHIPPED | OUTPATIENT
Start: 2023-08-26 | End: 2023-09-25

## 2023-08-26 RX ORDER — VANCOMYCIN HYDROCHLORIDE 50 MG/ML
125 KIT ORAL 4 TIMES DAILY
Qty: 105 ML | Refills: 0 | Status: SHIPPED | OUTPATIENT
Start: 2023-08-26

## 2023-08-26 NOTE — CM/SW NOTE
08/26/23 1001   Discharge disposition   Expected discharge disposition [facility] [address] [facility]  (Eric Zhao)   Discharge transportation [address]     Discharging today to [facility] in [facility]. Ambulance reserved for 1pm- Emeli Martins is impulsive and has poor safety awareness. Daughter Lucina Olguin notified of dc time. / to remain available for support and/or discharge planning.       Maeola Bence MBA BSN RN [address]  RN Case Manager  [phone]

## 2023-08-26 NOTE — PLAN OF CARE
Report given to [place]. Discharge information provided to pt and family.        Problem: DISCHARGE PLANNING  Goal: Discharge to home or other facility with appropriate resources  Description: INTERVENTIONS:  - Identify barriers to discharge w/pt and caregiver  - Include patient/family/discharge partner in discharge planning  - Arrange for needed discharge resources and transportation as appropriate  - Identify discharge learning needs (meds, wound care, etc)  - Arrange for interpreters to assist at discharge as needed  - Consider post-discharge preferences of patient/family/discharge partner  - Complete POLST form as appropriate  - Assess patient's ability to be responsible for managing their own health  - Refer to Case Management Department for coordinating discharge planning if the patient needs post-hospital services based on physician/LIP order or complex needs related to functional status, cognitive ability or social support system  Outcome: Adequate for Discharge

## 2023-08-26 NOTE — SLP NOTE
SPEECH DAILY NOTE - INPATIENT    ASSESSMENT & PLAN   ASSESSMENT    Proper PPE worn. Hands sanitized upon entrance/exit Pt room. Pt alert, afebrile and on room air. Pt seen for swallow analysis per treatment recommendations (after consulting with RN). Pt agreed to participate. Pt seen upright in bed with current diet of minced & moist/thin liquids (breakfast tray) for monitoring diet tolerance. Additionally, Pt seen with trials of solids for candidacy of upgrade of diet. Swallowing precautions/strategies discussed; minimal cues needed for execution. Functional bite reflex/mastication/lingual skills on solids with slightly prolonged duration. Minimal to mild oral solid retention cleared with cued liquid wash. Pharyngeal response appeared to trigger within 1-2 sec per hyolaryngeal elevation to completion (functional rise/strength per palpation). No overt clinical signs of aspiration on swallows of solid nor thin liquids (no coughing, no throat clearing, clear vocal quality and no SOB). Rec upgrade diet to SOFT EASY to CHEW/thin liquids. Collaborated with RN regarding Pt's swallowing plan of care. Per RN, Pt with good tolerance of current diet. No report of difficulty taking meds. Sp02 ~93% during this session. Call light within Pt's reach upon SLP discharge from room. CXR 8/23/23:  CONCLUSION: Clear lungs       PLAN:    To f/u x1 session to ensure safe intake of NEW UPGRADED diet and reinforce swallowing/aspiration precautions. Family education to be continued as available. Diet Recommendations - Solids: Soft/ Easy to chew  Diet Recommendations - Liquids:  Thin Liquids    Compensatory Strategies Recommended: Pinched straw sips  Aspiration Precautions: Slow rate;Upright position (for HOB as permitted by MD)  Medication Administration Recommendations: Whole in puree    Patient Experiencing Pain: No  Discharge Recommendations  Discharge Recommendations/Plan: Undetermined  Treatment Plan  Treatment Plan/Recommendations: Aspiration precautions    Interdisciplinary Communication: Discussed with RN      GOALS  Goal #1 The patient will tolerate clear liquids  consistency without overt signs or symptoms of aspiration with 100 % accuracy over one session(s). Pt tolerates thin liquids with no CSA observed. Upgraded to Minced and moist and thin liquids via pinched straws. The patient will tolerate minced and moist  consistency without overt signs or symptoms of aspiration with 100 % accuracy over one session(s). Initiated 8/21   GOAL MET     Goal #2 The patient will utilize compensatory strategies as outlined by  BSSE (clinical evaluation) including Slow rate, Small bites, Small sips, Upright 90 degrees (as approved by MD) with PRN feeding assistance 90 % of the time across 3 sessions. Pt upright in bed, self-fed oral trials. Swallowing precautions discussed/trained. Cues for alternating consistencies and controlled amounts. No straws used. Pt would benefit from additional reinforcement of aspiration precautions on NEW UPGRADED diet. In Progress   Goal #3 The patient will tolerate trial upgrade of SOFT/SOLID (with MD approval) consistency and thin liquids without overt signs or symptoms of aspiration with 100 % accuracy over 2 session(s). Pt trialed with solids. Increased mastication time. No CSA on thin liquids nor solids. Rec upgrade to SOFT SOLID. To f/u x1 for tolerance.       In Progress     FOLLOW UP  Follow Up Needed (Documentation Required): Yes  SLP Follow-up Date: 08/27/23  Number of Visits to Meet Established Goals: 2-3    Session: 2    If you have any questions, please contact   RENEE Ledesma  [facility] [place]  #[phone]

## 2023-08-31 ENCOUNTER — TELEPHONE (OUTPATIENT)
Dept: SURGERY | Facility: CLINIC | Age: 73
End: 2023-08-31

## 2023-08-31 NOTE — TELEPHONE ENCOUNTER
Noted the patient underwent BILATERAL CRANIOTOMY, SUBDURAL HEMATOMA with Dr. Lilian Graham on 8/17/2023    Noted the last inpt Dieudonne Ramirez on 8/26/2023 with Dr. Acacia Bryant:  \"  Assessment:   Jerome Felton in room 202/202-[facility]ANCE, is a 75year old male, Hospital Day ( LOS: 9 days ) hospitalized for Chronic subdural hematoma ([facility] [address] 9 Days Post-Op s/p Procedure(s):  BILATERAL CRANIOTOMY, SUBDURAL HEMATOMA       The patient's other hospital problems include: Active Hospital Problems    *Chronic subdural hematoma (HCC)       CKD (chronic kidney disease) stage 4, GFR 15-29 ml/min (HCC)       Anemia in stage 4 chronic kidney disease         Plan:   -Okay to downgrade from a neurosurgical standpoint  -Every 4 neurochecks  -No head of bed restrictions  -PT/OT  -Hold anticoagulation/antiplatelet therapy until resolution of hemorrhages  -No further neurosurgical imaging indicated at this time. Repeat CT brain without contrast if neurologic condition changes/declines. -Bilateral lower extremity Dopplers requested by [facility] given patient has not been on anticoagulation postsurgery due to intracranial hemorrhages  -Patient is appropriate for discharge from a neurosurgical standpoint once medically cleared  -Neurosurgery follow-up and discharge instructions documented  -Staple and drain stitch removal on 9/7/2023 for 2-week postoperative appointment. If able to be removed at rehab, then okay to do so from a neurosurgical standpoint. \"         Routed to the providers for review and provide feedback

## 2023-08-31 NOTE — TELEPHONE ENCOUNTER
Acknowledged the providers feedback listed below. Returned [facility] call   Infromed of the providers feedback listed below. Cher Paez states that the pt will be scheduled to have staples, drain and sutures removed on 9/7/2023 as well as completing CT head W/O Contrast prior to discharge. Pt to follow up in [facility] on 9/13/2023 per the providers feedback listed below. Routed to the front office to R/S pt.    Routed to the provider to be informed

## 2023-08-31 NOTE — TELEPHONE ENCOUNTER
Okay to reschedule, please review plan as outlined below:    -Staple and drain stitch removal on 9/7/2023 for 2-week postoperative appointment. If able to be removed at rehab, then okay to do so from a neurosurgical standpoint. Please have patient schedule CT head. Must be completed prior to follow up. I recommend a few days prior. Please advise.

## 2023-08-31 NOTE — TELEPHONE ENCOUNTER
Alonso Duarte from [facility] to r/s post op visit with [facility] for 9/13/2023. Stated pt will be discharged from Rehab facility on 9/8/2023. Alonso Duarte would like to verify no imaging is needed prior to post op appt.

## 2023-09-06 ENCOUNTER — TELEPHONE (OUTPATIENT)
Dept: SURGERY | Facility: CLINIC | Age: 73
End: 2023-09-06

## 2023-09-06 NOTE — TELEPHONE ENCOUNTER
Dr Annemarie Mullins from [facility], calling to speak to Dr. Catarino Moya regarding second opinion on pt's CT scan.  Call was transferred to [facility].

## 2023-09-06 NOTE — TELEPHONE ENCOUNTER
Spoke with Dr. Sherrill Rivas, resident at [facility] taking care of the patient. CT was obtained at their facility, to establish baseline. He sent me a video of the images. On my review, this appears to be an evolution of the known left-sided residual subdural hematoma. It appears slightly smaller, and more mature, when compared with the most recent study at our facility. Patient will follow-up with us next week as scheduled, unless he develops new symptoms.

## 2023-09-06 NOTE — TELEPHONE ENCOUNTER
Spoke to Dr Vinicius Cummins. Patient had a repeat CT scan as ordered for his follow up appt on 9-13-23 done at [facility]. Dr Vinicius Cummins states the CT is stable but there looks to be a small, new bleed. Dr Paxton Hooker was unavailable when Dr Vinicius Cummins called. He is requesting a call back to discuss further and plan of care. Cell [phone]. MD Informed.

## 2023-09-10 ENCOUNTER — PATIENT MESSAGE (OUTPATIENT)
Dept: SURGERY | Facility: CLINIC | Age: 73
End: 2023-09-10

## 2023-09-11 ENCOUNTER — TELEPHONE (OUTPATIENT)
Dept: SURGERY | Facility: CLINIC | Age: 73
End: 2023-09-11

## 2023-09-11 NOTE — TELEPHONE ENCOUNTER
To ED 8.7.23:  \"Patient presents with:  Edema  Altered Mental Status  Weakness\"  *Found to have bilateral acute on chronic subdural hematomas    Neurosx Consult 8.17.23 JANET Irby:  \"PLAN:  -Bilateral lucrecia holes versus small craniotomies for subdural hematoma evacuation today with Dr. Kristen Osorio  -Preoperative labs ordered  -Appreciate medical optimization from critical care team in the interim  -Every hour neurochecks  -SBP less than 140; Cardene drip as needed  -N.p.o.  -Hold anticoagulation/antiplatelet therapy  -Bedrest  -Verify informed consent order placed\"    *8.17.23 w/ Dr. Kristen Osorio:  \"Procedure performed:  1. Left craniotomy for evacuation of subdural hematoma  2. Right craniotomy for evacuation of subdural hematoma\"    MyChart message sent to pt answering questions they had.  Informed pt to obtain disc of CT scan

## 2023-09-11 NOTE — TELEPHONE ENCOUNTER
Spoke with pt daughter over the phone informed her to bring in all imaging dics to be uploaded to our PACS system on the date of appointment with BOWEN Torres on 9/13/2023.

## 2023-09-13 ENCOUNTER — TELEPHONE (OUTPATIENT)
Dept: SURGERY | Facility: CLINIC | Age: 73
End: 2023-09-13

## 2023-09-13 ENCOUNTER — OFFICE VISIT (OUTPATIENT)
Dept: SURGERY | Facility: CLINIC | Age: 73
End: 2023-09-13
Payer: MEDICARE

## 2023-09-13 VITALS
SYSTOLIC BLOOD PRESSURE: 138 MMHG | DIASTOLIC BLOOD PRESSURE: 76 MMHG | BODY MASS INDEX: 32.6 KG/M2 | WEIGHT: 184 LBS | HEIGHT: 63 IN

## 2023-09-13 DIAGNOSIS — Z98.890 S/P CRANIOTOMY: ICD-10-CM

## 2023-09-13 DIAGNOSIS — Z98.890 POST-OPERATIVE STATE: ICD-10-CM

## 2023-09-13 DIAGNOSIS — S06.5XAA SUBDURAL HEMATOMA (HCC): Primary | ICD-10-CM

## 2023-09-13 PROCEDURE — 99024 POSTOP FOLLOW-UP VISIT: CPT | Performed by: PHYSICIAN ASSISTANT

## 2023-09-22 ENCOUNTER — HOSPITAL ENCOUNTER (OUTPATIENT)
Dept: CT IMAGING | Facility: HOSPITAL | Age: 73
Discharge: HOME | End: 2023-09-22
Attending: PHYSICIAN ASSISTANT
Payer: MEDICARE

## 2023-09-22 DIAGNOSIS — Z98.890 S/P CRANIOTOMY: ICD-10-CM

## 2023-09-22 DIAGNOSIS — I62.03 CHRONIC SUBDURAL HEMATOMA (HCC): ICD-10-CM

## 2023-09-22 PROCEDURE — 70450 CT HEAD/BRAIN W/O DYE: CPT | Performed by: PHYSICIAN ASSISTANT

## 2023-09-27 ENCOUNTER — OFFICE VISIT (OUTPATIENT)
Dept: SURGERY | Facility: CLINIC | Age: 73
End: 2023-09-27
Payer: MEDICARE

## 2023-09-27 VITALS — HEIGHT: 63 IN | BODY MASS INDEX: 33.31 KG/M2 | WEIGHT: 188 LBS

## 2023-09-27 DIAGNOSIS — Z98.890 S/P CRANIOTOMY: ICD-10-CM

## 2023-09-27 DIAGNOSIS — Z98.890 POST-OPERATIVE STATE: ICD-10-CM

## 2023-09-27 DIAGNOSIS — S06.5XAA SUBDURAL HEMATOMA (HCC): Primary | ICD-10-CM

## 2023-09-27 PROCEDURE — 99024 POSTOP FOLLOW-UP VISIT: CPT | Performed by: PHYSICIAN ASSISTANT

## 2023-09-27 NOTE — PROGRESS NOTES
Patient: Padmini Montalvo  Medical Record Number: [id number]  YOB: 1950  PCP: No primary care provider on file. Reason for visit: Post op visit, s/p crani for SDH    HISTORY OF CHIEF COMPLAINT:    Padmini Montalvo is a very pleasant 75year old male who presents today for: Post op visit, s/p crani for SDH  S/p 8/17/23: Dr. Duc Brooks: Bilateral craniotomy for SDH      Last hx: 9/13/23  Padmini Montalvo is a very pleasant 75year old male who presents today for: S/p bilateral craniotomy, SDH  S/p 8/17/23: Dr. Duc Brooks: Bilateral craniotomy, SDH  Dr. Duc Brooks was contacted by a Dr. Rodrigo Bajwa from [facility]. Request Dr. Duc Brooks to review CT that was completed. Please see TE from 9/6/2023. Dr. Duc Brooks notes evolution of the known left-sided residual subdural hematoma. Appears slightly smaller, and more mature when compared to most recent study at [facility] facility. The patient presents for postoperative visit and states he is doing very well. Daughter endorses some mild memory changes. This is not worsening. It appears stable. Onset after surgical intervention. Patient endorses no speech issues, altered mental status, visual complaints or headaches. No numbness, tingling or weakness. He is pleased with his progress. No incisional issues. He begins outpatient therapy tomorrow. Past Medical History:   Diagnosis Date    Chronic kidney disease, stage 4 (severe) (HCC)     Diabetes ([facility]     Essential hypertension     TBI (traumatic brain injury) ([facility]       Past Surgical History:   Procedure Laterality Date    BRAIN SURGERY        No family history on file.    Social History    Socioeconomic History      Marital status:     Tobacco Use      Smoking status: Never      Smokeless tobacco: Never    Vaping Use      Vaping Use: Never used    Substance and Sexual Activity      Alcohol use: Not Currently      Drug use: Never       Penicillins             UNKNOWN   Current Medications:  Current Outpatient Medications Medication Sig Dispense Refill    amLODIPine 10 MG Oral Tab Take 1 tablet (10 mg total) by mouth daily. 30 tablet 0    vancomycin 50 mg/mL Oral Recon Soln Take 2.5 mL (125 mg total) by mouth 4 (four) times daily. 105 mL 0    acetaminophen 500 MG Oral Tab Take 1 tablet (500 mg total) by mouth every 4 (four) hours as needed for Fever or Pain. 120 tablet 0    oxyCODONE 5 MG Oral Tab Take 1 tablet (5 mg total) by mouth every 4 (four) hours as needed. 18 tablet 0    Naloxone HCl 4 MG/0.1ML Nasal Liquid 4 mg by Nasal route as needed. If patient remains unresponsive, repeat dose in other nostril 2-5 minutes after first dose. 1 kit 0    calcitriol 0.25 MCG Oral Cap Take 1 capsule (0.25 mcg total) by mouth Every Monday, Wednesday, and Friday. divalproex  MG Oral Tab EC DR tab Take 1 tablet (500 mg total) by mouth 3 (three) times daily. Ferrous Sulfate 324 (65 Fe) MG Oral Tab EC Take 1 tablet by mouth daily. sennosides 8.6 MG Oral Tab Take 1 tablet (8.6 mg total) by mouth 2 (two) times daily. REVIEW OF SYSTEMS   Comprehensive review of systems done. Negative except what is outlined in the above HPI. PHYSICAL EXAMIMATION    vitals were not taken for this visit. GENERAL: Very pleasant patient is in no apparent distress. Sitting comfortably in the examination chair. HEENT: Normocephalic, atraumatic. RESPIRATORY RATE: Easy and Even   SKIN: Warm and dry  NEURO: Awake, alert and orientated. Speech fluent, comprehension intact, answering questions appropriately. SPINE:  Gait/Coordination: Gait deferred.    Upper Extremity Strength:     Deltoid  Biceps  Triceps    Intrinsics      Right 5 5 5 5 5     Left 5 5 5 5 5   Lower Extremity Strength:     Iliopsoas    D-flexion   Right       5         5   Left       5         5     No clonus  Incision: CDI without edema, erythema or discharge    DATA:   None    IMAGING:   Study Result    Narrative   PROCEDURE: CT BRAIN OR HEAD (CPT=70450) COMPARISON: [facility], CT BRAIN OR HEAD (CPT=70450), 8/20/2023, 0:54 AM.  [facility], CT BRAIN OR HEAD (CPT=70450), 8/19/2023, 8:17 AM.  [facility], CT BRAIN OR HEAD (CPT=70450), 8/18/2023, 4:41 AM.     INDICATIONS: Z98.890 S/P craniotomy I62.03 Chronic subdural hematoma (HCC)     TECHNIQUE: CT images were obtained without contrast material.  Automated exposure control for dose reduction was used. Dose information is transmitted to the [facility]) NRDR ([address]) which includes the  Dose Index Registry. FINDINGS:     There postoperative changes from bilateral frontoparietal craniotomies along the coronal sutures. The bilateral subdural drainage catheters have been removed. There is dural thickening underlying the craniotomy sites. The intracranial pneumocephalus  has resolved. There has been marked interval decrease in size and density of the bilateral subdural hematomas. The hematomas are now predominantly hypodense to isodense with scattered areas of internal hyperdensity. The component of the subdural collection overlying   the lateral left frontal lobe measures 1.0 cm in thickness (5:31), previously 1.6 cm in thickness and has decreased in density in the associated mass effect has decreased. The mixed density subdural collections measure approximately 0.9 cm in thickness  overlying the bilateral frontal lobes (2:19). There is decreased size and density of the subdural collection along the left aspect of the falx cerebri that now measures 8 mm in thickness (2:24), previously 1.2 when measured similarly. The bilateral  subdural hygromas along the falx cerebri have resolved and the blood products along the right anteroinferior falx cerebri have almost entirely resolved.   There is unchanged mild CSF density prominence of the extra-axial spaces overlying the bilateral  anterolateral cerebellar hemispheres (2:11). There is a thin hyperdensity along the right aspect of the falx cerebri posteriorly and superior aspect of the right tentorial leaflet that measures 0.3 cm in thickness (5:44), which appears similar when  compared to the prior study. The hemorrhage posterior to the cerebellar hemispheres has resolved. No intraparenchymal or subarachnoid hemorrhage. The parenchymal volume is unchanged. The ventricular system is slightly increased in size when compared to 08/20/2023, which is favored to be secondary to decreasing mass effect from the subdural hematomas rather than hydrocephalus. There is no midline   shift. The basal cisterns are intact. There is atherosclerotic calcification of the carotid siphons. Redemonstrated enlarged and empty sella. There are unchanged scattered foci and patchy areas of hypoattenuation involving the periventricular and subcortical white matter, compatible with mild chronic microvascular ischemic changes. No new area of parenchymal hypoattenuation. The gray-white  differentiation is maintained. No displaced calvarial fracture. There is unchanged complete opacification of the left mastoid air cells and middle ear cavity. The right mastoid air cells are well aerated. There is partially visualized mild bilateral maxillary sinus mucosal  thickening. Impression   CONCLUSION:     Redemonstrated postoperative changes from prior bilateral frontoparietal craniotomies with interval decrease in size and density of the multifocal subdural hematomas as well as decrease in the local mass effect, described in detail above. Recommend  continued attention on follow-up imaging     Complete opacification of the left mastoid air cells and middle ear cavity, which may be sequela of chronic otomastoiditis. Lesser incidental findings described above.        Dictated by (CST): Brian Oliva MD on 9/22/2023 at 2:43 PM      Finalized by (CST): Wilfred Beverly Car MD on 9/22/2023 at 3:06 PM       MEDICAL DECISION MAKING:     ASSESSMENT and PLAN:  (S06.5XAA) Subdural hematoma (HCC)  (primary encounter diagnosis)     (W63.447) Post-operative state     (G37.367) S/P craniotomy  S/p 8/17/23: Dr. Sundeep Ordaz: Bilateral craniotomy, SDH    PLAN:   1. Medication: None prescribed  2. Imaging:    - Reviewed today:    - CT head:     - Agree with radiology, interval decrease of SDHs   - Ordered today:    - CT head:     - Complete in   3. Follow up in  or call or follow up sooner or go to the ED for any new, worsening or concerning signs or symptoms     Dr. Sundeep Ordaz and I reviewed imaging and discussed the plan. Dr. Sundeep Ordaz agrees with the plan. Dr. Sundeep Ordaz and I reviewed imaging and discussed the plan with the patient. The patient agrees with the plan, verbalized understanding and is appreciative. All questions were sought out and thoroughly answered to satisfaction. Total visit time: 15 min  More than 50% spent coordinating care, providing patient education, reviewing imaging, discussing further imaging and counseling. [facility] NARCISA Whiting M.S., CONSTANTINO GAITAN [facility]  [address] [facility], [address] [place] [address] [facility]  [place], [address] [place] [address]  [phone]  9/27/2023 10:19 AM    Dragon speech recognition software was used to prepare this note. If a word or phrase is confusing, it is likely due to a failure of recognition. Please contact me with any questions or clarifications.

## 2023-09-27 NOTE — PROGRESS NOTES
Patient: Torrey Ornelas  Medical Record Number: [id number]  YOB: 1950  PCP: No primary care provider on file. Reason for visit: S/p bilateral craniotomy, SDH     HISTORY OF CHIEF COMPLAINT:    Torrey Ornelas is a very pleasant 75year old male who presents today for: S/p bilateral craniotomy, SDH   S/p 8/17/23: Dr. Nain Singer: Bilateral craniotomy, SDH  The patient is doing very well. He endorses no headaches, visual disturbance, speech issues or altered mental status. No extremity numbness, tingling or weakness. He is very pleased with his progress. His right hand tremors are gradually improving. Last hx: 9/13/23  Torrey Ornelas is a very pleasant 75year old male who presents today for: S/p bilateral craniotomy, SDH  S/p 8/17/23: Dr. Nain Singer: Bilateral craniotomy, SDH  Dr. Nain Singer was contacted by a Dr. Joo Block from [facility]. Request Dr. Nain Singer to review CT that was completed. Please see TE from 9/6/2023. Dr. Nain Singer notes evolution of the known left-sided residual subdural hematoma. Appears slightly smaller, and more mature when compared to most recent study at [facility]. The patient presents for postoperative visit and states he is doing very well. Daughter endorses some mild memory changes. This is not worsening. It appears stable. Onset after surgical intervention. Patient endorses no speech issues, altered mental status, visual complaints or headaches. No numbness, tingling or weakness. He is pleased with his progress. No incisional issues. He begins outpatient therapy tomorrow. Past Medical History:   Diagnosis Date    Chronic kidney disease, stage 4 (severe) (HCC)     Diabetes ([address]     Essential hypertension     TBI (traumatic brain injury) ([address]       Past Surgical History:   Procedure Laterality Date    BRAIN SURGERY        No family history on file.    Social History    Socioeconomic History      Marital status:     Tobacco Use      Smoking status: Never Smokeless tobacco: Never    Vaping Use      Vaping Use: Never used    Substance and Sexual Activity      Alcohol use: Not Currently      Drug use: Never       Penicillins             UNKNOWN   Current Medications:  Current Outpatient Medications   Medication Sig Dispense Refill    amLODIPine 10 MG Oral Tab Take 1 tablet (10 mg total) by mouth daily. 30 tablet 0    vancomycin 50 mg/mL Oral Recon Soln Take 2.5 mL (125 mg total) by mouth 4 (four) times daily. 105 mL 0    acetaminophen 500 MG Oral Tab Take 1 tablet (500 mg total) by mouth every 4 (four) hours as needed for Fever or Pain. 120 tablet 0    oxyCODONE 5 MG Oral Tab Take 1 tablet (5 mg total) by mouth every 4 (four) hours as needed. 18 tablet 0    Naloxone HCl 4 MG/0.1ML Nasal Liquid 4 mg by Nasal route as needed. If patient remains unresponsive, repeat dose in other nostril 2-5 minutes after first dose. 1 kit 0    calcitriol 0.25 MCG Oral Cap Take 1 capsule (0.25 mcg total) by mouth Every Monday, Wednesday, and Friday. divalproex  MG Oral Tab EC DR tab Take 1 tablet (500 mg total) by mouth 3 (three) times daily. Ferrous Sulfate 324 (65 Fe) MG Oral Tab EC Take 1 tablet by mouth daily. sennosides 8.6 MG Oral Tab Take 1 tablet (8.6 mg total) by mouth 2 (two) times daily. REVIEW OF SYSTEMS   Comprehensive review of systems done. Negative except what is outlined in the above HPI. PHYSICAL EXAMIMATION    vitals were not taken for this visit. GENERAL: Very pleasant patient is in no apparent distress. Sitting comfortably in the examination chair. HEENT: Normocephalic, atraumatic. RESPIRATORY RATE: Easy and Even   SKIN: Warm and dry  NEURO: Awake, alert and orientated. Speech fluent, comprehension intact, answering questions appropriately. Follows commands easily. Face appears symmetric. Tongue midline. EOMs intact. PERRL. Able to shrug shoulders. Negative drift. Finger-nose intact.   Rapid altering movements are intact. SPINE:  Gait/Coordination: Gait deferred  Upper Extremity Strength:     Deltoid  Biceps  Triceps    Intrinsics      Right 5 5 5 5 5     Left 5 5 5 5 5   Lower Extremity Strength:     Iliopsoas    D-flexion   Right       5         5   Left       5         5     No clonus    DATA:   None    IMAGING:   Study Result    Narrative   PROCEDURE: CT BRAIN OR HEAD (CPT=70450)     COMPARISON: [facility], CT BRAIN OR HEAD (CPT=70450), 8/20/2023, 0:54 AM.  [facility], CT BRAIN OR HEAD (CPT=70450), 8/19/2023, 8:17 AM.  [facility], CT BRAIN OR HEAD (CPT=70450), 8/18/2023, 4:41 AM.     INDICATIONS: Z98.890 S/P craniotomy I62.03 Chronic subdural hematoma (HCC)     TECHNIQUE: CT images were obtained without contrast material.  Automated exposure control for dose reduction was used. Dose information is transmitted to the [facility]) NRDR ([address] [place] [address] Rd) which includes the  Dose Index Registry. FINDINGS:     There postoperative changes from bilateral frontoparietal craniotomies along the coronal sutures. The bilateral subdural drainage catheters have been removed. There is dural thickening underlying the craniotomy sites. The intracranial pneumocephalus  has resolved. There has been marked interval decrease in size and density of the bilateral subdural hematomas. The hematomas are now predominantly hypodense to isodense with scattered areas of internal hyperdensity. The component of the subdural collection overlying   the lateral left frontal lobe measures 1.0 cm in thickness (5:31), previously 1.6 cm in thickness and has decreased in density in the associated mass effect has decreased. The mixed density subdural collections measure approximately 0.9 cm in thickness  overlying the bilateral frontal lobes (2:19).   There is decreased size and density of the subdural collection along the left aspect of the falx cerebri that now measures 8 mm in thickness (2:24), previously 1.2 when measured similarly. The bilateral  subdural hygromas along the falx cerebri have resolved and the blood products along the right anteroinferior falx cerebri have almost entirely resolved. There is unchanged mild CSF density prominence of the extra-axial spaces overlying the bilateral  anterolateral cerebellar hemispheres (2:11). There is a thin hyperdensity along the right aspect of the falx cerebri posteriorly and superior aspect of the right tentorial leaflet that measures 0.3 cm in thickness (5:44), which appears similar when  compared to the prior study. The hemorrhage posterior to the cerebellar hemispheres has resolved. No intraparenchymal or subarachnoid hemorrhage. The parenchymal volume is unchanged. The ventricular system is slightly increased in size when compared to 08/20/2023, which is favored to be secondary to decreasing mass effect from the subdural hematomas rather than hydrocephalus. There is no midline   shift. The basal cisterns are intact. There is atherosclerotic calcification of the carotid siphons. Redemonstrated enlarged and empty sella. There are unchanged scattered foci and patchy areas of hypoattenuation involving the periventricular and subcortical white matter, compatible with mild chronic microvascular ischemic changes. No new area of parenchymal hypoattenuation. The gray-white  differentiation is maintained. No displaced calvarial fracture. There is unchanged complete opacification of the left mastoid air cells and middle ear cavity. The right mastoid air cells are well aerated. There is partially visualized mild bilateral maxillary sinus mucosal  thickening.                Impression   CONCLUSION:     Redemonstrated postoperative changes from prior bilateral frontoparietal craniotomies with interval decrease in size and density of the multifocal subdural hematomas as well as decrease in the local mass effect, described in detail above. Recommend  continued attention on follow-up imaging     Complete opacification of the left mastoid air cells and middle ear cavity, which may be sequela of chronic otomastoiditis. Lesser incidental findings described above. Dictated by (CST): Titus Holloway MD on 9/22/2023 at 2:43 PM      Finalized by (CST): Titus Holloway MD on 9/22/2023 at 3:06 PM       MEDICAL DECISION MAKING:     ASSESSMENT and PLAN:  (S06.5XAA) Subdural hematoma ([address]  (primary encounter diagnosis)     (C18.566) Post-operative state     (Z98.890) S/P craniotomy  S/p 8/17/23: Dr. Gretel Ramirez: Bilateral craniotomy, SDH    PLAN:   1. Medication: None prescribed  2. Imaging:    - Reviewed today:    - CT head:      - Agree with radiology, interval decrease of SDH   - Ordered today:    - CT head:     - Complete in 2 months, monitor SDH  3. Follow up in 2 months or call or follow up sooner or go to the ED for any new, worsening or concerning signs or symptoms     Dr. Gretel Ramirez and I reviewed imaging and discussed the plan. Dr. Gretel Ramirez agrees with the plan. I reviewed imaging and discussed the plan with the patient. The patient agrees with the plan, verbalized understanding and is appreciative. All questions were sought out and thoroughly answered to satisfaction. Total visit time: 15 min  More than 50% spent coordinating care, providing patient education, reviewing imaging, discussing further imaging and counseling. [facility] NARCISA Dillon M.S., CONSTANTINO GAITAN [address], [address] [place] [address] Matilde Padilla, [address] [place] [address]  [phone]  9/27/2023 10:48 AM    Dragon speech recognition software was used to prepare this note. If a word or phrase is confusing, it is likely due to a failure of recognition. Please contact me with any questions or clarifications.

## 2023-11-06 ENCOUNTER — HOSPITAL ENCOUNTER (OUTPATIENT)
Dept: CT IMAGING | Facility: HOSPITAL | Age: 73
Discharge: HOME | End: 2023-11-06
Attending: PHYSICIAN ASSISTANT
Payer: MEDICARE

## 2023-11-06 DIAGNOSIS — Z98.890 POST-OPERATIVE STATE: ICD-10-CM

## 2023-11-06 DIAGNOSIS — S06.5XAA SUBDURAL HEMATOMA (HCC): ICD-10-CM

## 2023-11-06 DIAGNOSIS — Z98.890 S/P CRANIOTOMY: ICD-10-CM

## 2023-11-06 PROCEDURE — 70450 CT HEAD/BRAIN W/O DYE: CPT | Performed by: PHYSICIAN ASSISTANT

## 2023-11-08 RX ORDER — CALCITRIOL 0.25 UG/1
0.25 CAPSULE, LIQUID FILLED ORAL
Refills: 0 | OUTPATIENT
Start: 2023-11-08

## 2023-11-08 NOTE — TELEPHONE ENCOUNTER
Medication: Calcitriol 0.25MG     Date of last ref[place]:  (#/)  [place]      Last office visit: 9/27/23  Due back to clinic per last office note:    Date next office visit scheduled:    Future Appointments   Date Time Provider Tracy Dee   11/9/2023  9:00 AM Ivory Carranza, CONST[address]INO [address] note recommendation:  \"PLAN:   1. Medication: None prescribed  2. Imaging:                 - Reviewed today:                              - CT head:                                             - Agree with radiology, interval decrease of SDH                - Ordered today:                              - CT head:                                            - Complete in 2 months, monitor SDH  3.  Follow up in 2 months or call or follow up sooner or go to the ED for any new, worsening or concerning signs or symptoms \"

## 2023-11-09 ENCOUNTER — OFFICE VISIT (OUTPATIENT)
Dept: SURGERY | Facility: CLINIC | Age: 73
End: 2023-11-09
Payer: MEDICARE

## 2023-11-09 VITALS
DIASTOLIC BLOOD PRESSURE: 70 MMHG | SYSTOLIC BLOOD PRESSURE: 120 MMHG | BODY MASS INDEX: 33.31 KG/M2 | WEIGHT: 188 LBS | HEIGHT: 63 IN

## 2023-11-09 DIAGNOSIS — Z98.890 S/P CRANIOTOMY: Primary | ICD-10-CM

## 2023-11-09 DIAGNOSIS — S06.5XAA SDH (SUBDURAL HEMATOMA) (HCC): ICD-10-CM

## 2023-11-09 PROCEDURE — 99024 POSTOP FOLLOW-UP VISIT: CPT | Performed by: PHYSICIAN ASSISTANT

## 2023-11-09 NOTE — PROGRESS NOTES
Patient: Debo Frederick  Medical Record Number: [id number]  YOB: 1950  PCP: PHIL MENDEZ [facility]    Reason for visit: s/p bilateral crani, SDH    HISTORY OF CHIEF COMPLAINT:    Debo Frederick is a very pleasant 75year old male who presents today for: s/p bilateral crani, SDH  S/p 8/17/23: Dr. Catarino Moya: Bilateral craniotomy, SDH  The patient endorses he is doing very well. No new neurologic complaints. No headaches. No visual complaints. No speech issues. No numbness, tingling or weakness. He is very pleased with his progress. His hand tremors have resolved. Memory has improved. Last hx: 9/27/23  Debo Frederick is a very pleasant 75year old male who presents today for: S/p bilateral craniotomy, SDH   S/p 8/17/23: Dr. Catarino Moya: Bilateral craniotomy, SDH  The patient is doing very well. He endorses no headaches, visual disturbance, speech issues or altered mental status. No extremity numbness, tingling or weakness. He is very pleased with his progress. His right hand tremors are gradually improving. Last hx: 9/13/23  Debo Frederick is a very pleasant 75year old male who presents today for: S/p bilateral craniotomy, SDH  S/p 8/17/23: Dr. Catarino Moya: Bilateral craniotomy, SDH  Dr. Catarino Moya was contacted by a Dr. Annemarie Mullins from [facility]. Request Dr. Catarino Moya to review CT that was completed. Please see TE from 9/6/2023. Dr. Catarino Moya notes evolution of the known left-sided residual subdural hematoma. Appears slightly smaller, and more mature when compared to most recent study at [facility] facility. The patient presents for postoperative visit and states he is doing very well. Daughter endorses some mild memory changes. This is not worsening. It appears stable. Onset after surgical intervention. Patient endorses no speech issues, altered mental status, visual complaints or headaches. No numbness, tingling or weakness. He is pleased with his progress. No incisional issues.   He begins outpatient therapy tomorrow. Past Medical History:   Diagnosis Date    Chronic kidney disease, stage 4 (severe) (HCC)     Diabetes ([facility]     Essential hypertension     TBI (traumatic brain injury) ([facility] [address]       Past Surgical History:   Procedure Laterality Date    BRAIN SURGERY        No family history on file. Social History     Socioeconomic History    Marital status:    Tobacco Use    Smoking status: Never    Smokeless tobacco: Never   Vaping Use    Vaping Use: Never used   Substance and Sexual Activity    Alcohol use: Not Currently    Drug use: Never      Allergies   Allergen Reactions    Penicillins UNKNOWN      Current Medications:  Current Outpatient Medications   Medication Sig Dispense Refill    amLODIPine 10 MG Oral Tab Take 1 tablet (10 mg total) by mouth daily. 30 tablet 0    vancomycin 50 mg/mL Oral Recon Soln Take 2.5 mL (125 mg total) by mouth 4 (four) times daily. 105 mL 0    acetaminophen 500 MG Oral Tab Take 1 tablet (500 mg total) by mouth every 4 (four) hours as needed for Fever or Pain. 120 tablet 0    Naloxone HCl 4 MG/0.1ML Nasal Liquid 4 mg by Nasal route as needed. If patient remains unresponsive, repeat dose in other nostril 2-5 minutes after first dose. 1 kit 0    calcitriol 0.25 MCG Oral Cap Take 1 capsule (0.25 mcg total) by mouth Every Monday, Wednesday, and Friday. divalproex  MG Oral Tab EC DR tab Take 1 tablet (500 mg total) by mouth 3 (three) times daily. Ferrous Sulfate 324 (65 Fe) MG Oral Tab EC Take 1 tablet by mouth daily. sennosides 8.6 MG Oral Tab Take 1 tablet (8.6 mg total) by mouth 2 (two) times daily. REVIEW OF SYSTEMS   Comprehensive review of systems done. Negative except what is outlined in the above HPI. PHYSICAL EXAMIMATION    vitals were not taken for this visit. GENERAL: Very pleasant patient is in no apparent distress. Sitting comfortably in the examination chair. HEENT: Normocephalic, atraumatic.   RESPIRATORY RATE: Easy and Even SKIN: Warm and dry  NEURO: Awake, alert and orientated. Speech fluent, comprehension intact, answering questions appropriately. Face appears symmetric. Tongue midline, motility intact. Able to shrug shoulders. EOMs intact. Negative drift. Finger-nose intact. Rapid altering movements are intact. SPINE:  Gait/Coordination: Gait deferred. Upper Extremity Strength:     Deltoid  Biceps  Triceps    Intrinsics      Right 5 5 5 5 5     Left 5 5 5 5 5   Lower Extremity Strength:     Iliopsoas    D-flexion   Right       5         5   Left       5         5       DATA:   None    IMAGING:   Study Result    Narrative   PROCEDURE: CT BRAIN OR HEAD (CPT=70450)     COMPARISON: [facility], CT BRAIN OR HEAD (CPT=70450), 9/22/2023, 1:51 PM.     INDICATIONS: History of bilateral frontoparietal craniotomies with bilateral SDH, patient denies any complaints. TECHNIQUE: CT images were obtained without contrast material.  Automated exposure control for dose reduction was used. Dose information is transmitted to the [facility]) NRDR ([address]) which includes the  Dose Index Registry. FINDINGS:  CSF SPACES: Small residual balance bilateral frontoparietal subdural fluid collections measuring between 5 and 6 mm from the inner table, and previously measuring about 9 mm from the inner table. No hydrocephalus or midline shift. CEREBRUM: No edema, hemorrhage, mass or acute infarct. CEREBELLUM: No edema, hemorrhage, mass or acute infarct. BRAINSTEM: No edema, hemorrhage, mass or acute infarct. CALVARIUM: Bilateral parietal craniotomies. SINUSES: Left mastoid and middle ear effusion are unchanged. Chronic right greater than left maxillary sinusitis. Minimal chronic thickening in the ethmoid sinuses. ORBITS: Limited views are unremarkable. OTHER: Empty sella turcica (typically asymptomatic normal variant).  Atherosclerotic calcification cavernous carotid arteries. Impression   CONCLUSION:  1. Bilateral craniotomies with small residual balance bilateral frontoparietal subdural fluid collections. No hydrocephalus or midline shift. Dictated by (CST): Mia Norton MD on 11/06/2023 at 11:31 AM      Finalized by (CST): Mia Norton MD on 11/06/2023 at 11:39 AM         MEDICAL DECISION MAKING:     ASSESSMENT and PLAN:    ICD-10-CM    1. S/P craniotomy  Z98.890 CT BRAIN OR HEAD (90866) [[id number]]    S/p 8/17/23: Dr. Nain Singer: Bilateral craniotomy, SDH      2. SDH (subdural hematoma) (HCC)  S06. 5XAA CT BRAIN OR HEAD (82046) [[id number]]          PLAN:   1. Medication: None prescribed  2. Imaging:    - Reviewed today:    -CT head:     -Agree with radiology, improvements noted of bilateral subdural collections, postop changes noted   - Ordered today:    -CT head:     -Complete in 2 months  3. Follow up in 2 months for CT head review or call or follow up sooner or go to the ED for any new, worsening or concerning signs or symptoms     I reviewed imaging. I discussed the plan and reviewed imaging with the patient. The patient agrees with the plan, verbalized understanding and is appreciative. All questions were sought out and thoroughly answered to satisfaction. Total visit time: 30 min  More than 50% spent coordinating care, providing patient education, reviewing imaging, discussing further imaging and counseling. Ivory Barboza M.S., CONSTANTINO GAITAN [facility]  [address] [place] [address], [address] [place] [address] Matilde Padilla, [address] [place] [address]  [phone]  11/9/2023 8:55 AM    Dragon speech recognition software was used to prepare this note. If a word or phrase is confusing, it is likely due to a failure of recognition. Please contact me with any questions or clarifications.

## 2024-01-23 ENCOUNTER — HOSPITAL ENCOUNTER (OUTPATIENT)
Dept: CT IMAGING | Facility: HOSPITAL | Age: 74
Discharge: HOME | End: 2024-01-23
Attending: PHYSICIAN ASSISTANT
Payer: MEDICARE

## 2024-01-23 DIAGNOSIS — Z98.890 S/P CRANIOTOMY: ICD-10-CM

## 2024-01-23 DIAGNOSIS — S06.5XAA SDH (SUBDURAL HEMATOMA) (HCC): ICD-10-CM

## 2024-01-23 PROCEDURE — 70450 CT HEAD/BRAIN W/O DYE: CPT | Performed by: PHYSICIAN ASSISTANT

## 2024-01-25 ENCOUNTER — OFFICE VISIT (OUTPATIENT)
Dept: SURGERY | Facility: CLINIC | Age: 74
End: 2024-01-25
Payer: MEDICARE

## 2024-01-25 DIAGNOSIS — Z98.890 S/P CRANIOTOMY: Primary | ICD-10-CM

## 2024-01-25 DIAGNOSIS — R25.1 TREMOR: ICD-10-CM

## 2024-01-25 DIAGNOSIS — S06.5XAA SDH (SUBDURAL HEMATOMA) (HCC): ICD-10-CM

## 2024-01-25 PROCEDURE — 99213 OFFICE O/P EST LOW 20 MIN: CPT | Performed by: PHYSICIAN ASSISTANT

## 2024-01-25 NOTE — PROGRESS NOTES
Patient: Ramón Bedoya  Medical Record Number: [id number]  YOB: 1950  PCP: LEONEL CORTES    Reason for visit: s/p bilateral crani, SDH     HISTORY OF CHIEF COMPLAINT:    Ramón Bedoya is a very pleasant 73 year old male who presents today for: s/p bilateral crani, SDH   The patient endorses no headaches.  He states his tremors are continuing to improve.  He is on Depakote.  He was on Depakote from a fall in July.  He had arrived at the hospital given tremulousness.  He was diagnosed with subdural collections and was taken to surgery.  He has been on Depakote since.  He has not established with neurology.  Depakote management by his PCP he states.  He would like to become established with neurology to see if he can come off of the Depakote.  Besides tremulousness, no seizure activity he states.  He has not had any seizure-like activity other than tremors since surgical intervention.  He endorses no new neurologic complaints.    Last hx: 11/9/23  Ramón Bedoya is a very pleasant 73 year old male who presents today for: s/p bilateral crani, SDH  S/p 8/17/23: Dr. Chen: Bilateral craniotomy, SDH  The patient endorses he is doing very well.  No new neurologic complaints.  No headaches.  No visual complaints.  No speech issues.  No numbness, tingling or weakness.  He is very pleased with his progress.  His hand tremors have resolved.  Memory has improved.     Last hx: 9/27/23  Ramón Bedoya is a very pleasant 73 year old male who presents today for: S/p bilateral craniotomy, SDH   S/p 8/17/23: Dr. Chen: Bilateral craniotomy, SDH  The patient is doing very well.  He endorses no headaches, visual disturbance, speech issues or altered mental status.  No extremity numbness, tingling or weakness.  He is very pleased with his progress.  His right hand tremors are gradually improving.     Last hx: 9/13/23  Ramón Bedoya is a very pleasant 73 year old male who presents today for: S/p bilateral craniotomy, SDH  S/p 8/17/23:  Dr. Chen: Bilateral craniotomy, SDH  Dr. Chen was contacted by a Dr. Frausto from [facility].  Request Dr. Chen to review CT that was completed.  Please see TE from 9/6/2023.  Dr. Chen notes evolution of the known left-sided residual subdural hematoma.  Appears slightly smaller, and more mature when compared to most recent study at [facility].  The patient presents for postoperative visit and states he is doing very well.  Daughter endorses some mild memory changes.  This is not worsening.  It appears stable.  Onset after surgical intervention.  Patient endorses no speech issues, altered mental status, visual complaints or headaches.  No numbness, tingling or weakness.  He is pleased with his progress.  No incisional issues.  He begins outpatient therapy tomorrow.    Past Medical History:   Diagnosis Date    Chronic kidney disease, stage 4 (severe) (HCC)     Diabetes (HCC)     Essential hypertension     TBI (traumatic brain injury) (HCC)       Past Surgical History:   Procedure Laterality Date    BRAIN SURGERY        No family history on file.   Social History     Socioeconomic History    Marital status:    Tobacco Use    Smoking status: Never    Smokeless tobacco: Never   Vaping Use    Vaping Use: Never used   Substance and Sexual Activity    Alcohol use: Not Currently    Drug use: Never      Allergies   Allergen Reactions    Penicillins UNKNOWN      Current Medications:  Current Outpatient Medications   Medication Sig Dispense Refill    amLODIPine 10 MG Oral Tab Take 1 tablet (10 mg total) by mouth daily. 30 tablet 0    vancomycin 50 mg/mL Oral Recon Soln Take 2.5 mL (125 mg total) by mouth 4 (four) times daily. 105 mL 0    acetaminophen 500 MG Oral Tab Take 1 tablet (500 mg total) by mouth every 4 (four) hours as needed for Fever or Pain. 120 tablet 0    Naloxone HCl 4 MG/0.1ML Nasal Liquid 4 mg by Nasal route as needed. If patient remains unresponsive, repeat dose in other nostril 2-5  minutes after first dose. 1 kit 0    calcitriol 0.25 MCG Oral Cap Take 1 capsule (0.25 mcg total) by mouth Every Monday, Wednesday, and Friday.      divalproex  MG Oral Tab EC DR tab Take 1 tablet (500 mg total) by mouth 3 (three) times daily.      Ferrous Sulfate 324 (65 Fe) MG Oral Tab EC Take 1 tablet by mouth daily.      sennosides 8.6 MG Oral Tab Take 1 tablet (8.6 mg total) by mouth 2 (two) times daily.          REVIEW OF SYSTEMS   Comprehensive review of systems done. Negative except what is outlined in the above HPI.     PHYSICAL EXAMIMATION    vitals were not taken for this visit.   GENERAL: Very pleasant patient is in no apparent distress. Sitting comfortably in the examination chair.   HEENT: Normocephalic, atraumatic.  RESPIRATORY RATE: Easy and Even   SKIN: Warm and dry  NEURO: Awake, alert and orientated. Speech fluent, comprehension intact, answering questions appropriately.  Face appears symmetric.  Tongue midline, motility intact.  Uvula and palate elevates symmetrically.  Able to shrug shoulders.  Negative drift.  Rapid altering movements are intact.  Finger-nose is intact.  EOMs intact.    SPINE:  Gait/Coordination: Gait deferred.   5/5 strength of bilateral upper and lower extremities, proximally distally. No clonus.    DATA:   None    IMAGING:     Study Result    Narrative   PROCEDURE: CT BRAIN OR HEAD (CPT=70450)     COMPARISON: [facility], CT BRAIN OR HEAD (CPT=70450), 11/06/2023, 9:51 AM.     INDICATIONS: History of bilateral frontoparietal craniotomies for bilateral SDH's, +intermittent nausea.     TECHNIQUE: CT images were obtained without contrast material.  Automated exposure control for dose reduction was used.  Dose information is transmitted to the ACR (American College of Radiology) NRDR (National Radiology Data Registry) which includes the  Dose Index Registry.     FINDINGS:  CSF SPACES: Small residual intermediate density balanced bilateral  frontoparietal subdural fluid collections measuring about mm from the inner table, and previously measuring about 9 mm from the inner table.  No hydrocephalus or midline shift.  CEREBRUM: No edema, hemorrhage, mass or acute infarct.  CEREBELLUM: No edema, hemorrhage, mass or acute infarct.  BRAINSTEM: No edema, hemorrhage, mass or acute infarct.  CALVARIUM: Bilateral parietal craniotomies.  SINUSES: Left mastoid and middle ear effusion are unchanged.  Chronic right greater than left maxillary sinusitis.  Minimal chronic thickening in the ethmoid sinuses.  ORBITS: Limited views are unremarkable.       OTHER: Empty sella turcica (typically asymptomatic normal variant). Atherosclerotic calcification cavernous carotid arteries.                  Impression   CONCLUSION:  1. Stable small residual balanced bilateral intermediate density subdural fluid collections measuring approximately 5 mm from the inner table in the frontal regions.  No hydrocephalus or midline shift.  2. Left mastoid and middle ear effusion without significant change.           Dictated by (CST): Clark Henry MD on 1/23/2024 at 11:14 AM      Finalized by (CST): Clark Henry MD on 1/23/2024 at 11:19 AM      MEDICAL DECISION MAKING:     ASSESSMENT and PLAN:    ICD-10-CM    1. S/P craniotomy  Z98.890 Neuro Referral - In Network     CT BRAIN OR HEAD (90738) [[id number]]      2. SDH (subdural hematoma) (HCC)  S06.5XAA Neuro Referral - In Network     CT BRAIN OR HEAD (53122) [[id number]]      3. Tremor  R25.1 Neuro Referral - In Network        PLAN:   1. Medication: None prescribed  2. Imaging:    - Reviewed today:    - CT head:     - Agree with radiology, stable appearing subdural fluid collections   - Ordered today:    -CT head, 2-3 months:     -Continue to monitor subdural fluid collections.  If imaging is stable or improved on his next examination, will plan for the patient to follow-up as needed.  3.  Neurology:   -Depakote management.  Patient with a  history of tremors send surgical intervention.  The patient had fallen in July, this is initially when he was placed on Depakote.  He arrived in August with a complaint of tremulousness.  Patient was taken for surgical intervention for craniotomy for subdural decompressions.  He has been on Depakote since.  4. Follow up in 2-3 months or call or follow up sooner or go to the ED for any new, worsening or concerning signs or symptoms     I reviewed imaging. I discussed the plan and reviewed imaging with the patient. The patient agrees with the plan, verbalized understanding and is appreciative. All questions were sought out and thoroughly answered to satisfaction.       Total visit time: 30 minutes  More than 50% spent coordinating care, providing patient education, reviewing imaging, discussing further imaging, discussing neurology and counseling.    Eli Torres M.S., PA-C  [address], [address] [address]  [phone]  1/25/2024 11:14 AM    Dragon speech recognition software was used to prepare this note. If a word or phrase is confusing, it is likely due to a failure of recognition. Please contact me with any questions or clarifications.

## 2024-01-25 NOTE — PATIENT INSTRUCTIONS
Refill policies:    Allow 2-3 business days for refills; controlled substances may take longer.  Contact your pharmacy at least 5 days prior to running out of medication and have them send an electronic request or submit request through the “request refill” option in your [employer] account.  Refills are not addressed on weekends; covering physicians do not authorize routine medications on weekends.  No narcotics or controlled substances are refilled after noon on Fridays or by on call physicians.  By law, narcotics must be electronically prescribed.  A 30 day supply with no refills is the maximum allowed.  If your prescription is due for a refill, you may be due for a follow up appointment.  To best provide you care, patients receiving routine medications need to be seen at least once a year.  Patients receiving narcotic/controlled substance medications need to be seen at least once every 3 months.  In the event that your preferred pharmacy does not have the requested medication in stock (e.g. Backordered), it is your responsibility to find another pharmacy that has the requested medication available.  We will gladly send a new prescription to that pharmacy at your request.    Scheduling Tests:    If your physician has ordered radiology tests such as MRI or CT scans, please contact Central Scheduling at [phone] right away to schedule the test.  Once scheduled, the [facility] Centralized Referral Team will work with your insurance carrier to obtain pre-certification or prior authorization.  Depending on your insurance carrier, approval may take 3-10 days.  It is highly recommended patients assure they have received an authorization before having a test performed.  If test is done without insurance authorization, patient may be responsible for the entire amount billed.      Precertification and Prior Authorizations:  If your physician has recommended that you have a procedure or additional testing performed the [facility]  Centralized Referral Team will contact your insurance carrier to obtain pre-certification or prior authorization.    You are strongly encouraged to contact your insurance carrier to verify that your procedure/test has been approved and is a COVERED benefit.  Although the [facility] Centralized Referral Team does its due diligence, the insurance carrier gives the disclaimer that \"Although the procedure is authorized, this does not guarantee payment.\"    Ultimately the patient is responsible for payment.   Thank you for your understanding in this matter.  Paperwork Completion:  If you require FMLA or disability paperwork for your recovery, please make sure to either drop it off or have it faxed to our office at [phone]. Be sure the form has your name and date of birth on it.  The form will be faxed to our Forms Department and they will complete it for you.  There is a 25$ fee for all forms that need to be filled out.  Please be aware there is a 10-14 day turnaround time.  You will need to sign a release of information (AGUILAR) form if your paperwork does not come with one.  You may call the Forms Department with any questions at [phone].  Their fax number is [phone].

## 2024-01-25 NOTE — PROGRESS NOTES
Patient is here for follow up.  He is s/p craniotomy and SDH.  He completed a CT head on 1-23-24 and is here to discuss the results.

## 2024-01-25 NOTE — PROGRESS NOTES
Patient: Ramón Bedoya  Medical Record Number: [id number]  YOB: 1950  PCP: LEONEL CORTES    Reason for visit: SDH, s/p crani, CT review    HISTORY OF CHIEF COMPLAINT:    Ramón Bedoya is a very pleasant 73 year old male who presents today for: SDH, s/p crani, CT review  Treatment since last office visit:  Rate: Patient rates the pain  /10. ***  Patient Denies bladder/bowel incontinence/retention.   No trips or falls over flat surfaces, no gait instability. Not dropping items. No difficulty opening jars or buttoning a shirt. ***    Past Medical History:   Diagnosis Date    Chronic kidney disease, stage 4 (severe) (HCC)     Diabetes (HCC)     Essential hypertension     TBI (traumatic brain injury) (HCC)       Past Surgical History:   Procedure Laterality Date    BRAIN SURGERY        No family history on file.   Social History     Socioeconomic History    Marital status:    Tobacco Use    Smoking status: Never    Smokeless tobacco: Never   Vaping Use    Vaping Use: Never used   Substance and Sexual Activity    Alcohol use: Not Currently    Drug use: Never      Allergies   Allergen Reactions    Penicillins UNKNOWN      Current Medications:  Current Outpatient Medications   Medication Sig Dispense Refill    amLODIPine 10 MG Oral Tab Take 1 tablet (10 mg total) by mouth daily. 30 tablet 0    vancomycin 50 mg/mL Oral Recon Soln Take 2.5 mL (125 mg total) by mouth 4 (four) times daily. 105 mL 0    acetaminophen 500 MG Oral Tab Take 1 tablet (500 mg total) by mouth every 4 (four) hours as needed for Fever or Pain. 120 tablet 0    Naloxone HCl 4 MG/0.1ML Nasal Liquid 4 mg by Nasal route as needed. If patient remains unresponsive, repeat dose in other nostril 2-5 minutes after first dose. 1 kit 0    calcitriol 0.25 MCG Oral Cap Take 1 capsule (0.25 mcg total) by mouth Every Monday, Wednesday, and Friday.      divalproex  MG Oral Tab EC DR tab Take 1 tablet (500 mg total) by mouth 3 (three) times daily.       Ferrous Sulfate 324 (65 Fe) MG Oral Tab EC Take 1 tablet by mouth daily.      sennosides 8.6 MG Oral Tab Take 1 tablet (8.6 mg total) by mouth 2 (two) times daily.          REVIEW OF SYSTEMS   Comprehensive review of systems done. Negative except what is outlined in the above HPI.     PHYSICAL EXAMIMATION    vitals were not taken for this visit.   GENERAL: Very pleasant patient is in no apparent distress. Sitting comfortably in the examination chair. ***  HEENT: Normocephalic, atraumatic.  RESPIRATORY RATE: Easy and Even ***  CARDIAC: Regular rate and rhythm   MUSCULOSKELETAL: ***  SKIN: Warm and dry  NEURO: Awake, alert and orientated. Speech fluent, comprehension intact, answering questions appropriately.     SPINE:  Gait/Coordination: Intact, non-antalgic gait. Able to deep knee bend on one foot bilaterally, reports equal strength.  Sensation: Sensory deficits noted on bilateral upper extremities to light touch: None ***  Cervical/Lumbar Integument: Skin over cervical spine is intact without rashes, excoriations, lesions or erythema. ***  ROM:  Cervical Flexion  Pain free   Cervical Extension Pain free   Cervical Rotation  Pain free  Palpation: Non tender to palpation of midline cervical spine ***  Palpation Right   (POS or NEG) Left   (POS or NEG)   Paraspinal Cervical Muscles Neg Neg     Upper Extremity Strength:     Deltoid  Biceps  Triceps    Intrinsics      Right 5 5 5 5 5     Left 5 5 5 5 5   Lower Extremity Strength:     Iliopsoas  Hamstrings   Quads    D-flexion P-flexion Great Toe   Right       5         5       5         5 5 5   Left       5         5       5         5 5 5   Moving bilateral lower extremities spontaneously to full resistance. ***  Tests: ***  Test Right   (POS or NEG) Left   (POS or NEG)   Spurling's Maneuver ?            Neg ?               Neg   Gill's Sign Neg Neg   Tromner's Sign Neg Neg   Babinski Neg Neg   Clonus Neg Neg   ***  Test Right   (POS or NEG) Left   (POS or  NEG)   Carpal Tunnel Tinel's ?            Neg ?               Neg   Phalen's Neg Neg   Ulnar Tinel's Neg Neg     DATA:   None    IMAGING:       MEDICAL DECISION MAKING:     ASSESSMENT and PLAN:  No diagnosis found.    PLAN:   1. Medication: ***  2. Imaging: ***   - Reviewed today:    -    - Ordered today:    -   3. Activity: ***  4. Referral: ***  5. Work: ***  6. Follow up *** or call or follow up sooner or go to the ED for any new, worsening or concerning signs or symptoms     I reviewed imaging. I discussed the plan and reviewed imaging with the patient. The patient agrees with the plan, verbalized understanding and is appreciative. All questions were sought out and thoroughly answered to satisfaction.       Total visit time:   More than 50% spent coordinating care, providing patient education, reviewing imaging and counseling.    Eli Torres M.S., PA-C  [address] [address]  [phone]  1/25/2024 10:54 AM    Dragon speech recognition software was used to prepare this note. If a word or phrase is confusing, it is likely due to a failure of recognition. Please contact me with any questions or clarifications.

## 2024-03-18 ENCOUNTER — HOSPITAL ENCOUNTER (OUTPATIENT)
Dept: CT IMAGING | Facility: HOSPITAL | Age: 74
Discharge: HOME | End: 2024-03-18
Attending: PHYSICIAN ASSISTANT
Payer: MEDICARE

## 2024-03-18 DIAGNOSIS — S06.5XAA SDH (SUBDURAL HEMATOMA) (HCC): ICD-10-CM

## 2024-03-18 DIAGNOSIS — Z98.890 S/P CRANIOTOMY: ICD-10-CM

## 2024-03-18 PROCEDURE — 70450 CT HEAD/BRAIN W/O DYE: CPT | Performed by: PHYSICIAN ASSISTANT

## 2024-03-20 ENCOUNTER — OFFICE VISIT (OUTPATIENT)
Dept: SURGERY | Facility: CLINIC | Age: 74
End: 2024-03-20
Payer: MEDICARE

## 2024-03-20 VITALS
WEIGHT: 189 LBS | BODY MASS INDEX: 33.49 KG/M2 | SYSTOLIC BLOOD PRESSURE: 143 MMHG | HEART RATE: 59 BPM | HEIGHT: 63 IN | DIASTOLIC BLOOD PRESSURE: 55 MMHG

## 2024-03-20 DIAGNOSIS — Z98.890 S/P CRANIOTOMY: ICD-10-CM

## 2024-03-20 DIAGNOSIS — S06.5XAA SDH (SUBDURAL HEMATOMA) (HCC): Primary | ICD-10-CM

## 2024-03-20 PROCEDURE — 99213 OFFICE O/P EST LOW 20 MIN: CPT | Performed by: PHYSICIAN ASSISTANT

## 2024-03-20 RX ORDER — FLURBIPROFEN SODIUM 0.3 MG/ML
1 SOLUTION/ DROPS OPHTHALMIC AS DIRECTED
COMMUNITY
Start: 2024-03-01

## 2024-03-20 RX ORDER — SODIUM BICARBONATE 650 MG/1
650 TABLET ORAL 2 TIMES DAILY
COMMUNITY
Start: 2024-03-04

## 2024-03-20 RX ORDER — AMIODARONE HYDROCHLORIDE 200 MG/1
200 TABLET ORAL DAILY
COMMUNITY
Start: 2023-12-06

## 2024-03-20 RX ORDER — SODIUM POLYSTYRENE SULFONATE 15 G/60ML
SUSPENSION ORAL; RECTAL
COMMUNITY
Start: 2024-02-07

## 2024-03-20 RX ORDER — LOSARTAN POTASSIUM 25 MG/1
25 TABLET ORAL DAILY
COMMUNITY
Start: 2023-12-06

## 2024-03-20 RX ORDER — ATORVASTATIN CALCIUM 10 MG/1
10 TABLET, FILM COATED ORAL DAILY
COMMUNITY
Start: 2024-03-01

## 2024-03-20 RX ORDER — LEVETIRACETAM 500 MG/1
500 TABLET ORAL 2 TIMES DAILY
COMMUNITY
Start: 2023-12-05

## 2024-03-20 RX ORDER — FUROSEMIDE 40 MG/1
40 TABLET ORAL DAILY
COMMUNITY
Start: 2024-01-02

## 2024-03-20 NOTE — PROGRESS NOTES
Patient: Ramón Bedoya  Medical Record Number: [id number]  YOB: 1950  PCP: LEONEL CORTES    Reason for visit: S/p bilateral craniotomy, SDH    HISTORY OF CHIEF COMPLAINT:    Ramón Bedoya is a very pleasant 73 year old male who presents today for: S/p bilateral craniotomy, SDH  S/p 8/17/23: Dr. Chen: Bilateral craniotomy, SDH  The patient is clinically doing well.  He has no headaches, visual complaints, speech issues, memory complaints.  No extremity pain, numbness, tingling or weakness.  He is leaving for [place] in July.  He would like to know if he can fly on an airplane.     Last hx: 1/25/24  Ramón Bedoya is a very pleasant 73 year old male who presents today for: s/p bilateral crani, SDH   The patient endorses no headaches.  He states his tremors are continuing to improve.  He is on Depakote.  He was on Depakote from a fall in July.  He had arrived at the hospital given tremulousness.  He was diagnosed with subdural collections and was taken to surgery.  He has been on Depakote since.  He has not established with neurology.  Depakote management by his PCP he states.  He would like to become established with neurology to see if he can come off of the Depakote.  Besides tremulousness, no seizure activity he states.  He has not had any seizure-like activity other than tremors since surgical intervention.  He endorses no new neurologic complaints.     Last hx: 11/9/23  Ramón Bedoya is a very pleasant 73 year old male who presents today for: s/p bilateral crani, SDH  S/p 8/17/23: Dr. Chen: Bilateral craniotomy, SDH  The patient endorses he is doing very well.  No new neurologic complaints.  No headaches.  No visual complaints.  No speech issues.  No numbness, tingling or weakness.  He is very pleased with his progress.  His hand tremors have resolved.  Memory has improved.     Last hx: 9/27/23  Ramón Bedoya is a very pleasant 73 year old male who presents today for: S/p bilateral craniotomy, SDH   S/p  8/17/23: Dr. Chen: Bilateral craniotomy, SDH  The patient is doing very well.  He endorses no headaches, visual disturbance, speech issues or altered mental status.  No extremity numbness, tingling or weakness.  He is very pleased with his progress.  His right hand tremors are gradually improving.     Last hx: 9/13/23  Ramón Bedoya is a very pleasant 73 year old male who presents today for: S/p bilateral craniotomy, SDH  S/p 8/17/23: Dr. Chen: Bilateral craniotomy, SDH  Dr. Chen was contacted by a Dr. Frausto from [facility].  Request Dr. Chen to review CT that was completed.  Please see TE from 9/6/2023.  Dr. Chen notes evolution of the known left-sided residual subdural hematoma.  Appears slightly smaller, and more mature when compared to most recent study at [facility].  The patient presents for postoperative visit and states he is doing very well.  Daughter endorses some mild memory changes.  This is not worsening.  It appears stable.  Onset after surgical intervention.  Patient endorses no speech issues, altered mental status, visual complaints or headaches.  No numbness, tingling or weakness.  He is pleased with his progress.  No incisional issues.  He begins outpatient therapy tomorrow.    Past Medical History:   Diagnosis Date    Chronic kidney disease, stage 4 (severe) (HCC)     Diabetes (HCC)     Essential hypertension     TBI (traumatic brain injury) (HCC)       Past Surgical History:   Procedure Laterality Date    BRAIN SURGERY        No family history on file.   Social History     Socioeconomic History    Marital status:    Tobacco Use    Smoking status: Never    Smokeless tobacco: Never   Vaping Use    Vaping Use: Never used   Substance and Sexual Activity    Alcohol use: Not Currently    Drug use: Never      Allergies   Allergen Reactions    Penicillins UNKNOWN      Current Medications:  Current Outpatient Medications   Medication Sig Dispense Refill    SPS 15 GM/60ML Oral  Suspension Take 120 mL by mouth one time for 1 dose.      sodium bicarbonate 650 MG Oral Tab Take 1 tablet (650 mg total) by mouth 2 (two) times daily.      losartan 25 MG Oral Tab Take 1 tablet (25 mg total) by mouth daily.      levETIRAcetam 500 MG Oral Tab Take 1 tablet (500 mg total) by mouth 2 (two) times daily.      BD PEN NEEDLE MINI U/F 31G X 5 MM Does not apply Misc Take 1 Bottle by mouth As Directed.      furosemide 40 MG Oral Tab Take 1 tablet (40 mg total) by mouth daily.      Continuous Blood Gluc Sensor (FREESTYLE NANDO 2 SENSOR) Does not apply Misc USE 1 SENSOR EVERY 2 WEEKS      Continuous Blood Gluc  (FREESTYLE NANDO 2 READER) Does not apply Device USE EVERY 6 HOURS      atorvastatin 10 MG Oral Tab Take 1 tablet (10 mg total) by mouth daily.      amiodarone 200 MG Oral Tab Take 1 tablet (200 mg total) by mouth daily.      amLODIPine 10 MG Oral Tab Take 1 tablet (10 mg total) by mouth daily. 30 tablet 0    vancomycin 50 mg/mL Oral Recon Soln Take 2.5 mL (125 mg total) by mouth 4 (four) times daily. 105 mL 0    acetaminophen 500 MG Oral Tab Take 1 tablet (500 mg total) by mouth every 4 (four) hours as needed for Fever or Pain. 120 tablet 0    Naloxone HCl 4 MG/0.1ML Nasal Liquid 4 mg by Nasal route as needed. If patient remains unresponsive, repeat dose in other nostril 2-5 minutes after first dose. 1 kit 0    calcitriol 0.25 MCG Oral Cap Take 1 capsule (0.25 mcg total) by mouth Every Monday, Wednesday, and Friday.      divalproex  MG Oral Tab EC DR tab Take 1 tablet (500 mg total) by mouth 3 (three) times daily.      Ferrous Sulfate 324 (65 Fe) MG Oral Tab EC Take 1 tablet by mouth daily.      sennosides 8.6 MG Oral Tab Take 1 tablet (8.6 mg total) by mouth 2 (two) times daily.          REVIEW OF SYSTEMS   Comprehensive review of systems done. Negative except what is outlined in the above HPI.     PHYSICAL EXAMIMATION    vitals were not taken for this visit.   GENERAL: Very pleasant  patient is in no apparent distress. Sitting comfortably in the examination chair.   HEENT: Normocephalic, atraumatic.  RESPIRATORY RATE: Easy and Even   SKIN: Warm and dry  NEURO: Awake, alert and orientated. Speech fluent, comprehension intact, answering questions appropriately. Face appears symmetric. Tongue midline. EOMs intact. Able to shrug shoulders. Negative drift. PATRICIA intact. F2N intact.     SPINE:  Gait/Coordination: Gait deferred.   Good strength of BUE and BLE, proximally and distally  No clonus    DATA:   None    IMAGING:     Study Result    Narrative   PROCEDURE: CT BRAIN OR HEAD (CPT=70450)     COMPARISON: [facility], CT BRAIN OR HEAD (CPT=70450), 1/23/2024, 10:49 AM.     INDICATIONS: History of bilateral frontoparietal craniotomies for bilateral SDH's, patient denies any complaints.     TECHNIQUE: CT images were obtained without contrast material.  Automated exposure control for dose reduction was used.  Dose information is transmitted to the ACR (American College of Radiology) NRDR (National Radiology Data Registry) which includes the  Dose Index Registry.     FINDINGS:  CSF SPACES: Trace (less than 2 mm) subdural fluid collections over the anterior-lateral margins of the frontal lobes, similar to prior exam without significant mass effect upon the adjacent brain or midline shift.  The measures slightly higher than  water/CSF density but do not contain acute blood products.  The ventricles are normal in size and configuration.  Basal cisterns are patent.  CEREBRUM: No edema, hemorrhage, mass, acute infarction, or significant atrophy.    WHITE MATTER: Mild chronic white matter ischemic changes.    CEREBELLUM: No edema, hemorrhage, mass, acute infarction, or significant atrophy.    BRAINSTEM: No edema, hemorrhage, mass, acute infarction, or significant atrophy.    CALVARIUM: Post bilateral frontoparietal craniotomies.  Expected postprocedural changes are present at these sites  and in the overlying scalp.  No suspicious bone lesion..  SINUSES: Mild chronic inflammation within the maxillary sinuses.  No fluid levels in the paranasal sinuses.  Near complete opacification of the left mastoid air cells.  ORBITS: Limited views are unremarkable.       OTHER: There is calcific atherosclerosis in the cavernous segments of the internal carotid arteries and vertebral arteries.               Impression   CONCLUSION:  1.  No acute intracranial process.  Redemonstration of hairline subdural fluid collections measuring slightly higher than water/CSF density over the outer aspects of both cerebral hemispheres.  No associated mass effect or midline shift.  The appearance  is compatible with subacute/chronic subdural hematomas/hygromas.  Post bilateral frontal-parietal craniotomies.  2.  There are mild microvascular white matter ischemic changes, likely related to long-standing hypertension and/or diabetes.  3.  Atherosclerosis in the anterior and posterior circulations.  4.  Near complete opacification of the left mastoid air cells.  Correlate for left-sided mastoiditis/otomastoiditis.  There is also mild chronic paranasal sinus inflammation.     Dictated by (CST): Ammon Topete MD on 3/18/2024 at 2:31 PM      Finalized by (CST): Ammon Topete MD on 3/18/2024 at 2:34 PM         MEDICAL DECISION MAKING:     ASSESSMENT and PLAN:    ICD-10-CM    1. SDH (subdural hematoma) (HCC)  S06.5XAA CT BRAIN OR HEAD (00082) [[id number]]      2. S/P craniotomy  Z98.890 CT BRAIN OR HEAD (90269) [[id number]]        PLAN:   1. Medication: None prescribed   2. Imaging:    - Reviewed today:    - CT head:     - Agree with radiology, stable hairline subdural fluid collections noted, likely chronic subdural hematomas vs hygromas.    - Ordered today:    - CT head:     - Given persistent SDH vs hygroma, will repeat head CT in 3 months. Possibility that the patient may have a chronic subdural hygroma that may not completely resolve. If CT  head stable/improved in June and the patient is neurologically stable, will plan for no further CT imaging from a NSGY stand point unless a change in neuro status. Clearance from a NSGY stand point to fly to [place] pending repeat head CT in [place]  3. Follow up in 3 months or call or follow up sooner or go to the ED for any new, worsening or concerning signs or symptoms     I reviewed imaging. I discussed the plan and reviewed imaging with the patient. The patient agrees with the plan, verbalized understanding and is appreciative. All questions were sought out and thoroughly answered to satisfaction.       Total visit time: 30 min  More than 50% spent coordinating care, providing patient education, reviewing imaging, discussing further imaging and counseling.    Eli Torres M.S., PA-C  [address] [address]  [phone]  3/20/2024 9:42 AM    Dragon speech recognition software was used to prepare this note. If a word or phrase is confusing, it is likely due to a failure of recognition. Please contact me with any questions or clarifications.

## 2024-04-02 ENCOUNTER — HOSPITAL ENCOUNTER (INPATIENT)
Facility: HOSPITAL | Age: 74
LOS: 7 days | Discharge: HOME HEALTH | End: 2024-04-09
Attending: EMERGENCY MEDICINE | Admitting: HOSPITALIST
Payer: MEDICARE

## 2024-04-02 ENCOUNTER — APPOINTMENT (OUTPATIENT)
Dept: GENERAL RADIOLOGY | Facility: HOSPITAL | Age: 74
End: 2024-04-02
Attending: EMERGENCY MEDICINE
Payer: MEDICARE

## 2024-04-02 DIAGNOSIS — R06.00 DYSPNEA, UNSPECIFIED TYPE: Primary | ICD-10-CM

## 2024-04-02 DIAGNOSIS — N17.9 ACUTE RENAL FAILURE, UNSPECIFIED ACUTE RENAL FAILURE TYPE (HCC): ICD-10-CM

## 2024-04-02 PROBLEM — R73.9 HYPERGLYCEMIA: Status: ACTIVE | Noted: 2024-04-02

## 2024-04-02 PROBLEM — D64.9 ANEMIA: Status: ACTIVE | Noted: 2024-04-02

## 2024-04-02 PROBLEM — E87.1 HYPONATREMIA: Status: ACTIVE | Noted: 2024-04-02

## 2024-04-02 PROBLEM — E87.20 METABOLIC ACIDOSIS: Status: ACTIVE | Noted: 2024-04-02

## 2024-04-02 PROBLEM — J11.1 INFLUENZAL BRONCHITIS: Status: ACTIVE | Noted: 2024-04-02

## 2024-04-02 PROBLEM — E87.3 RESPIRATORY ALKALOSIS: Status: ACTIVE | Noted: 2024-04-02

## 2024-04-02 LAB
ALBUMIN SERPL-MCNC: 4.2 G/DL (ref 3.2–4.8)
ALBUMIN/GLOB SERPL: 1.3 {RATIO} (ref 1–2)
ALP LIVER SERPL-CCNC: 100 U/L
ALT SERPL-CCNC: 57 U/L
ANION GAP SERPL CALC-SCNC: 12 MMOL/L (ref 0–18)
ANION GAP SERPL CALC-SCNC: 12 MMOL/L (ref 0–18)
APTT PPP: 36.3 SECONDS (ref 23.3–35.6)
AST SERPL-CCNC: 65 U/L (ref ?–34)
ATRIAL RATE: 73 BPM
BASE EXCESS BLD CALC-SCNC: -6.8 MMOL/L (ref ?–2)
BASOPHILS # BLD AUTO: 0.02 X10(3) UL (ref 0–0.2)
BASOPHILS NFR BLD AUTO: 0.2 %
BILIRUB SERPL-MCNC: 0.7 MG/DL (ref 0.2–1.1)
BLOOD GAS EPAP: 5 CM H2O
BLOOD GAS IPAP: 10 CM H2O
BNP SERPL-MCNC: 318 PG/ML
BUN BLD-MCNC: 77 MG/DL (ref 9–23)
BUN BLD-MCNC: 79 MG/DL (ref 9–23)
BUN/CREAT SERPL: 14.5 (ref 10–20)
BUN/CREAT SERPL: 14.8 (ref 10–20)
CALCIUM BLD-MCNC: 8.5 MG/DL (ref 8.7–10.4)
CALCIUM BLD-MCNC: 8.6 MG/DL (ref 8.7–10.4)
CHLORIDE SERPL-SCNC: 104 MMOL/L (ref 98–112)
CHLORIDE SERPL-SCNC: 104 MMOL/L (ref 98–112)
CHOLEST SERPL-MCNC: 135 MG/DL (ref ?–200)
CO2 SERPL-SCNC: 18 MMOL/L (ref 21–32)
CO2 SERPL-SCNC: 18 MMOL/L (ref 21–32)
CREAT BLD-MCNC: 5.32 MG/DL
CREAT BLD-MCNC: 5.35 MG/DL
DEPRECATED RDW RBC AUTO: 47.5 FL (ref 35.1–46.3)
EGFRCR SERPLBLD CKD-EPI 2021: 10 ML/MIN/1.73M2 (ref 60–?)
EGFRCR SERPLBLD CKD-EPI 2021: 11 ML/MIN/1.73M2 (ref 60–?)
EOSINOPHIL # BLD AUTO: 0 X10(3) UL (ref 0–0.7)
EOSINOPHIL NFR BLD AUTO: 0 %
ERYTHROCYTE [DISTWIDTH] IN BLOOD BY AUTOMATED COUNT: 14 % (ref 11–15)
EST. AVERAGE GLUCOSE BLD GHB EST-MCNC: 143 MG/DL (ref 68–126)
FLUAV + FLUBV RNA SPEC NAA+PROBE: NEGATIVE
FLUAV + FLUBV RNA SPEC NAA+PROBE: POSITIVE
GLOBULIN PLAS-MCNC: 3.3 G/DL (ref 2.8–4.4)
GLUCOSE BLD-MCNC: 214 MG/DL (ref 70–99)
GLUCOSE BLD-MCNC: 215 MG/DL (ref 70–99)
GLUCOSE BLDC GLUCOMTR-MCNC: 164 MG/DL (ref 70–99)
GLUCOSE BLDC GLUCOMTR-MCNC: 205 MG/DL (ref 70–99)
GLUCOSE BLDC GLUCOMTR-MCNC: 208 MG/DL (ref 70–99)
GLUCOSE BLDC GLUCOMTR-MCNC: 223 MG/DL (ref 70–99)
HBA1C MFR BLD: 6.6 % (ref ?–5.7)
HCO3 BLDA-SCNC: 19.6 MEQ/L (ref 21–27)
HCT VFR BLD AUTO: 26.4 %
HDLC SERPL-MCNC: 44 MG/DL (ref 40–59)
HGB BLD-MCNC: 8.9 G/DL
IMM GRANULOCYTES # BLD AUTO: 0.04 X10(3) UL (ref 0–1)
IMM GRANULOCYTES NFR BLD: 0.4 %
INR BLD: 0.95 (ref 0.8–1.2)
LACTATE SERPL-SCNC: 1.5 MMOL/L (ref 0.5–2)
LDLC SERPL CALC-MCNC: 75 MG/DL (ref ?–100)
LYMPHOCYTES # BLD AUTO: 0.49 X10(3) UL (ref 1–4)
LYMPHOCYTES NFR BLD AUTO: 4.4 %
MCH RBC QN AUTO: 30.9 PG (ref 26–34)
MCHC RBC AUTO-ENTMCNC: 33.7 G/DL (ref 31–37)
MCV RBC AUTO: 91.7 FL
MONOCYTES # BLD AUTO: 0.76 X10(3) UL (ref 0.1–1)
MONOCYTES NFR BLD AUTO: 6.9 %
NEUTROPHILS # BLD AUTO: 9.72 X10 (3) UL (ref 1.5–7.7)
NEUTROPHILS # BLD AUTO: 9.72 X10(3) UL (ref 1.5–7.7)
NEUTROPHILS NFR BLD AUTO: 88.1 %
NONHDLC SERPL-MCNC: 91 MG/DL (ref ?–130)
O2 CT BLD-SCNC: 12.2 VOL% (ref 15–23)
O2/TOTAL GAS SETTING VFR VENT: 70 %
OSMOLALITY SERPL CALC.SUM OF ELEC: 307 MOSM/KG (ref 275–295)
OSMOLALITY SERPL CALC.SUM OF ELEC: 308 MOSM/KG (ref 275–295)
PCO2 BLDA: 34 MM HG (ref 35–45)
PH BLDA: 7.34 [PH] (ref 7.35–7.45)
PLATELET # BLD AUTO: 182 10(3)UL (ref 150–450)
PO2 BLDA: 91 MM HG (ref 80–100)
POTASSIUM SERPL-SCNC: 4.6 MMOL/L (ref 3.5–5.1)
POTASSIUM SERPL-SCNC: 4.6 MMOL/L (ref 3.5–5.1)
PROCALCITONIN SERPL-MCNC: 0.74 NG/ML (ref ?–0.05)
PROT SERPL-MCNC: 7.5 G/DL (ref 5.7–8.2)
PROTHROMBIN TIME: 13.2 SECONDS (ref 11.6–14.8)
PUNCTURE CHARGE: YES
Q-T INTERVAL: 458 MS
QRS DURATION: 108 MS
QTC CALCULATION (BEZET): 504 MS
R AXIS: 7 DEGREES
RBC # BLD AUTO: 2.88 X10(6)UL
RSV RNA SPEC NAA+PROBE: NEGATIVE
SAO2 % BLDA: 97.8 % (ref 94–100)
SARS-COV-2 RNA RESP QL NAA+PROBE: NOT DETECTED
SODIUM SERPL-SCNC: 134 MMOL/L (ref 136–145)
SODIUM SERPL-SCNC: 134 MMOL/L (ref 136–145)
T AXIS: 64 DEGREES
TRIGL SERPL-MCNC: 83 MG/DL (ref 30–149)
TROPONIN I SERPL HS-MCNC: 110 NG/L
TROPONIN I SERPL HS-MCNC: 68 NG/L
VENTRICULAR RATE: 73 BPM
VLDLC SERPL CALC-MCNC: 13 MG/DL (ref 0–30)
WBC # BLD AUTO: 11 X10(3) UL (ref 4–11)

## 2024-04-02 PROCEDURE — 99223 1ST HOSP IP/OBS HIGH 75: CPT | Performed by: INTERNAL MEDICINE

## 2024-04-02 PROCEDURE — 5A09357 ASSISTANCE WITH RESPIRATORY VENTILATION, LESS THAN 24 CONSECUTIVE HOURS, CONTINUOUS POSITIVE AIRWAY PRESSURE: ICD-10-PCS | Performed by: HOSPITALIST

## 2024-04-02 PROCEDURE — 99283 EMERGENCY DEPT VISIT LOW MDM: CPT | Performed by: INTERNAL MEDICINE

## 2024-04-02 PROCEDURE — 99223 1ST HOSP IP/OBS HIGH 75: CPT | Performed by: HOSPITALIST

## 2024-04-02 PROCEDURE — 71045 X-RAY EXAM CHEST 1 VIEW: CPT | Performed by: EMERGENCY MEDICINE

## 2024-04-02 RX ORDER — CALCITRIOL 0.25 UG/1
0.25 CAPSULE, LIQUID FILLED ORAL
Status: DISCONTINUED | OUTPATIENT
Start: 2024-04-03 | End: 2024-04-09

## 2024-04-02 RX ORDER — TEMAZEPAM 15 MG/1
15 CAPSULE ORAL NIGHTLY PRN
Status: DISCONTINUED | OUTPATIENT
Start: 2024-04-02 | End: 2024-04-09

## 2024-04-02 RX ORDER — LEVETIRACETAM 500 MG/5ML
500 INJECTION, SOLUTION, CONCENTRATE INTRAVENOUS EVERY 12 HOURS
Status: DISCONTINUED | OUTPATIENT
Start: 2024-04-02 | End: 2024-04-09

## 2024-04-02 RX ORDER — FUROSEMIDE 40 MG/1
40 TABLET ORAL DAILY
Status: DISCONTINUED | OUTPATIENT
Start: 2024-04-02 | End: 2024-04-05

## 2024-04-02 RX ORDER — AMLODIPINE BESYLATE 10 MG/1
10 TABLET ORAL DAILY
Status: DISCONTINUED | OUTPATIENT
Start: 2024-04-02 | End: 2024-04-09

## 2024-04-02 RX ORDER — LOSARTAN POTASSIUM 25 MG/1
25 TABLET ORAL DAILY
Status: DISCONTINUED | OUTPATIENT
Start: 2024-04-02 | End: 2024-04-04

## 2024-04-02 RX ORDER — BENZONATATE 200 MG/1
200 CAPSULE ORAL 3 TIMES DAILY PRN
Status: DISCONTINUED | OUTPATIENT
Start: 2024-04-02 | End: 2024-04-09

## 2024-04-02 RX ORDER — FUROSEMIDE 10 MG/ML
40 INJECTION INTRAMUSCULAR; INTRAVENOUS ONCE
Status: COMPLETED | OUTPATIENT
Start: 2024-04-02 | End: 2024-04-02

## 2024-04-02 RX ORDER — VANCOMYCIN HYDROCHLORIDE 125 MG/1
125 CAPSULE ORAL DAILY
Status: DISCONTINUED | OUTPATIENT
Start: 2024-04-02 | End: 2024-04-09

## 2024-04-02 RX ORDER — INSULIN GLARGINE 100 [IU]/ML
6 INJECTION, SOLUTION SUBCUTANEOUS NIGHTLY
COMMUNITY

## 2024-04-02 RX ORDER — VANCOMYCIN HYDROCHLORIDE 125 MG/1
125 CAPSULE ORAL DAILY
Status: DISCONTINUED | OUTPATIENT
Start: 2024-04-02 | End: 2024-04-02

## 2024-04-02 RX ORDER — IPRATROPIUM BROMIDE AND ALBUTEROL SULFATE 2.5; .5 MG/3ML; MG/3ML
3 SOLUTION RESPIRATORY (INHALATION) EVERY 6 HOURS PRN
Status: DISCONTINUED | OUTPATIENT
Start: 2024-04-02 | End: 2024-04-09

## 2024-04-02 RX ORDER — HYDRALAZINE HYDROCHLORIDE 25 MG/1
25 TABLET, FILM COATED ORAL 3 TIMES DAILY
COMMUNITY

## 2024-04-02 RX ORDER — ACETAMINOPHEN 500 MG
500 TABLET ORAL EVERY 4 HOURS PRN
Status: DISCONTINUED | OUTPATIENT
Start: 2024-04-02 | End: 2024-04-09

## 2024-04-02 RX ORDER — METOCLOPRAMIDE HYDROCHLORIDE 5 MG/ML
5 INJECTION INTRAMUSCULAR; INTRAVENOUS EVERY 8 HOURS PRN
Status: DISCONTINUED | OUTPATIENT
Start: 2024-04-02 | End: 2024-04-05

## 2024-04-02 RX ORDER — METHYLPREDNISOLONE SODIUM SUCCINATE 40 MG/ML
40 INJECTION, POWDER, LYOPHILIZED, FOR SOLUTION INTRAMUSCULAR; INTRAVENOUS EVERY 8 HOURS
Status: DISCONTINUED | OUTPATIENT
Start: 2024-04-02 | End: 2024-04-04

## 2024-04-02 RX ORDER — NICOTINE POLACRILEX 4 MG
15 LOZENGE BUCCAL
Status: DISCONTINUED | OUTPATIENT
Start: 2024-04-02 | End: 2024-04-09

## 2024-04-02 RX ORDER — INSULIN DEGLUDEC 100 U/ML
6 INJECTION, SOLUTION SUBCUTANEOUS NIGHTLY
Status: DISCONTINUED | OUTPATIENT
Start: 2024-04-02 | End: 2024-04-04

## 2024-04-02 RX ORDER — ATORVASTATIN CALCIUM 10 MG/1
10 TABLET, FILM COATED ORAL DAILY
Status: DISCONTINUED | OUTPATIENT
Start: 2024-04-02 | End: 2024-04-09

## 2024-04-02 RX ORDER — AMIODARONE HYDROCHLORIDE 200 MG/1
200 TABLET ORAL DAILY
Status: DISCONTINUED | OUTPATIENT
Start: 2024-04-02 | End: 2024-04-09

## 2024-04-02 RX ORDER — SODIUM BICARBONATE 650 MG/1
650 TABLET ORAL 2 TIMES DAILY
Status: DISCONTINUED | OUTPATIENT
Start: 2024-04-02 | End: 2024-04-09

## 2024-04-02 RX ORDER — NICOTINE POLACRILEX 4 MG
30 LOZENGE BUCCAL
Status: DISCONTINUED | OUTPATIENT
Start: 2024-04-02 | End: 2024-04-09

## 2024-04-02 RX ORDER — SODIUM CHLORIDE 9 MG/ML
INJECTION, SOLUTION INTRAVENOUS CONTINUOUS
Status: DISCONTINUED | OUTPATIENT
Start: 2024-04-02 | End: 2024-04-03

## 2024-04-02 RX ORDER — LEVETIRACETAM 500 MG/1
500 TABLET ORAL 2 TIMES DAILY
Status: DISCONTINUED | OUTPATIENT
Start: 2024-04-02 | End: 2024-04-02

## 2024-04-02 RX ORDER — DEXTROSE MONOHYDRATE 25 G/50ML
50 INJECTION, SOLUTION INTRAVENOUS
Status: DISCONTINUED | OUTPATIENT
Start: 2024-04-02 | End: 2024-04-09

## 2024-04-02 RX ORDER — ONDANSETRON 2 MG/ML
4 INJECTION INTRAMUSCULAR; INTRAVENOUS EVERY 6 HOURS PRN
Status: DISCONTINUED | OUTPATIENT
Start: 2024-04-02 | End: 2024-04-09

## 2024-04-02 RX ORDER — HEPARIN SODIUM 5000 [USP'U]/ML
5000 INJECTION, SOLUTION INTRAVENOUS; SUBCUTANEOUS EVERY 12 HOURS SCHEDULED
Status: DISCONTINUED | OUTPATIENT
Start: 2024-04-02 | End: 2024-04-05

## 2024-04-02 RX ORDER — HYDRALAZINE HYDROCHLORIDE 25 MG/1
25 TABLET, FILM COATED ORAL 3 TIMES DAILY
Status: DISCONTINUED | OUTPATIENT
Start: 2024-04-02 | End: 2024-04-09

## 2024-04-02 NOTE — ED QUICK NOTES
Orders for admission, patient is aware of plan and ready to go upstairs. Any questions, please call ED RN Sheree at extension [phone].     Patient Covid vaccination status: Fully vaccinated     COVID Test Ordered in ED: SARS-CoV-2/Flu A and B/RSV by PCR (GeneXpert)    COVID Suspicion at Admission: N/A    Running Infusions:  None    Mental Status/LOC at time of transport: A&Ox4/4     Other pertinent information:   CIWA score: N/A   NIH score:  N/A

## 2024-04-02 NOTE — H&P
[facility]    PATIENT'S NAME: JAGRUTI HOOD   ATTENDING PHYSICIAN: Booker Brooks MD   PATIENT ACCOUNT#:   [id number]    LOCATION:  [address]  MEDICAL RECORD #:   [id number]       YOB: 1950  ADMISSION DATE:       04/02/2024    HISTORY AND PHYSICAL EXAMINATION    CHIEF COMPLAINT:  Acute bronchitis, hypoxemia.    HISTORY OF PRESENT ILLNESS:  The patient is a 74-year-old  male with known past medical history of near end-stage renal disease.  Traveled with his family to [place] and on the way back he started developing wheezing and shortness of breath.  The patient has persistent symptoms.  Brought in today to the emergency department for evaluation.  Upon arrival, pulse ox was 65% on room air.  He was in respiratory distress upon arrival.  CBC showed white blood cell count of 11.0 with left shift, hemoglobin 8.9.  Arterial blood gas showed pH of 7.34 and pCO2 of 34.  Chemistry showed sodium 134, bicarb 18, BUN 77, creatinine 5.32, GFR 11 which is slightly below his baseline.  Usually he runs in the teens.  GeneXpert viral panel was positive for influenza A.  Troponin was 68.  The patient was started on IV Solu-Medrol, nebulizer treatments, and furosemide.  He was given doxycycline and ceftriaxone.  He will be admitted to hospital for further management.  Blood cultures were obtained.  Placed on BiPAP support.    PAST MEDICAL HISTORY:  Diabetes mellitus type 2 insulin-dependent with diabetic nephropathy and near end-stage renal disease, the patient had declined dialysis; history of hypertension; seizure disorder; paroxysmal atrial fibrillation maintained in sinus rhythm on amiodarone; history of C difficile infection; anemia of chronic kidney disease; transient ischemic attack; hyperlipidemia.    PAST SURGICAL HISTORY:  He had bilateral craniotomy for posttraumatic subdural hematomas, remote left ear surgery.    MEDICATIONS:  Please see medication reconciliation  list.    ALLERGIES:  Penicillin.    FAMILY HISTORY:  Positive for hypertension and diabetes mellitus type 2.    SOCIAL HISTORY:  No tobacco, alcohol, or drug use.  Retired .  Lives with his family.  Independent in his basic activities of daily living.    REVIEW OF SYSTEMS:  Family reported that the patient started having wheezing and shortness of breath a few days ago.  No other family members were sick.  Symptoms started while they were in [place].  The patient had body aches, fever, cough productive of phlegm.  He does have some dyspnea on exertion at baseline.  Other 12-point review of systems negative.      PHYSICAL EXAMINATION:    GENERAL:  Alert.  Oriented to time, place, and person.  Moderate distress.  VITAL SIGNS:  Temperature 98.0, pulse 63, respiratory rate 18, blood pressure 130/64, pulse ox 99% on room air.  HEENT:  Atraumatic.  Oropharynx clear, dry mucous membranes.  Normal hard and soft palate.  Eyes:  Anicteric sclerae.  NECK:  Supple.  No lymphadenopathy.  Trachea midline.  Full range of motion.  LUNGS:  Bilateral diffuse expiratory wheezing.  Increased respiratory effort.  HEART:  Regular rate and rhythm.  S1, S2 auscultated.  No murmur.  ABDOMEN:  Soft, nondistended, obese.  Positive bowel sounds.  EXTREMITIES:  +1 edema both legs.  No clubbing or cyanosis.  NEUROLOGIC:  Motor and sensory intact.    ASSESSMENT:    1.   Influenza bronchitis.  Rule out underlying pneumonia.  Chest x-ray still pending.  2.   Near end-stage renal disease.  Refused dialysis.  Continues to refuse dialysis.  3.   Insulin-dependent diabetes mellitus type 2.  4.   Acute hypoxemic respiratory failure.    PLAN:  The patient will be admitted to general medical floor.  IV Solu-Medrol and nebulizer treatments, monitor his clinical status, oxygen support, start him empirically on IV antibiotics for possibility of community-acquired pneumonia, follow up on chest x-ray, obtain pulmonary and nephrology consults.  His  BNP is mildly elevated which probably reflects no significant fluid overload status for now.  Further recommendations to follow.    Dictated By Whitney Lott MD  d: 04/02/2024 13:05:39  t: 04/02/2024 13:09:17  Job [id number]  FB/    cc: Booker Brooks MD

## 2024-04-02 NOTE — CONSULTS
[facility]  part of [facility]    Report of Consultation    Ramón Bedoya Patient Status:  Emergency    3/21/1950 MRN [id number]   Location [facility] EMERGENCY DEPARTMENT Attending Booker Brooks MD   Hosp Day # 0 PCP LEONEL CORTES     Date of Admission:  2024    Reason for Consultation:   Acute respiratory failure    History of Present Illness:   Patient is a 74-year-old male with underlying history of diabetes mellitus, hypertension, chronic kidney disease who presents with chief complaint of worsening dyspnea and cold-like symptoms over the last 3 to 4 days.  Tested positive for influenza A on presentation.  Primarily nonproductive cough.  Significant hypoxia on presentation currently on BiPAP 70% FiO2.  Arousable.  Denies history of known lung disease.  Denies recent history of known pneumonia.  Hemodynamically stable.    Past Medical History  Past Medical History:   Diagnosis Date    Chronic kidney disease, stage 4 (severe) (HCC)     Diabetes (HCC)     Essential hypertension     TBI (traumatic brain injury) (HCC)        Past Surgical History  Past Surgical History:   Procedure Laterality Date    BRAIN SURGERY  2023    bilat craniotomy for evacuation of subdural hematoma       Family History  No family history on file.    Social History  Social History     Socioeconomic History    Marital status:    Tobacco Use    Smoking status: Never    Smokeless tobacco: Never   Vaping Use    Vaping Use: Never used   Substance and Sexual Activity    Alcohol use: Not Currently    Drug use: Never           Current Medications:  No current facility-administered medications for this encounter.     (Not in a hospital admission)      Allergies  Allergies   Allergen Reactions    Penicillins UNKNOWN       Review of Systems:   Constitutional: Fatigue  HEENT: denies headache, sore throat, vision loss  Cardio: denies chest pain, chest pressure, palpitations  Respiratory: dyspnea, cough,  denies wheezing, hemoptysis   GI: denies nausea, vomiting, abdominal pain  : denies dysuria, hematuria  Musculoskeletal: denies arthralgia, myalgia  Integumentary: denies rash, itching  Neurological: denies syncope, weakness, dizziness,   Psychiatric: denies depression, anxiety  Hematologic: denies bruising        Physical Exam:   Blood pressure 102/62, pulse 50, temperature 98.3 °F (36.8 °C), temperature source Temporal, resp. rate 16, weight 190 lb (86.2 kg), SpO2 100%.    Constitutional: no acute distress  Eyes: PERRL  ENT: nares patent  Neck: neck supple, no JVD  Cardio: RRR, S1 S2  Respiratory: Expiratory Rales  GI: abdomen soft, non tender, active bowel souds, no organomegaly  Extremities: no clubbing, cyanosis, edema  Neurologic: no gross motor deficits  Skin: warm, dry    Results:   Laboratory Data  Lab Results   Component Value Date    WBC 11.0 04/02/2024    HGB 8.9 (L) 04/02/2024    HCT 26.4 (L) 04/02/2024    .0 04/02/2024    CREATSERUM 5.35 (H) 04/02/2024    BUN 79 (H) 04/02/2024     (L) 04/02/2024    K 4.6 04/02/2024     04/02/2024    CO2 18.0 (L) 04/02/2024     (H) 04/02/2024    CA 8.5 (L) 04/02/2024    ALB 4.2 04/02/2024    ALKPHO 100 04/02/2024    TP 7.5 04/02/2024    AST 65 (H) 04/02/2024    ALT 57 (H) 04/02/2024    PTT 36.3 (H) 04/02/2024    INR 0.95 04/02/2024    PTP 13.2 04/02/2024    MG 2.7 (H) 08/23/2023    PHOS 2.7 08/23/2023         Imaging  XR CHEST AP PORTABLE  (CPT=71045)    Result Date: 4/2/2024  CONCLUSION: Multifocal pneumonia.    Dictated by (CST): Nikos Putnam MD on 4/02/2024 at 1:14 PM     Finalized by (CST): Nikos Putnam MD on 4/02/2024 at 1:15 PM           Assessment   1.  Acute hypoxemic respiratory failure  2.  Influenza A  3.  Multifocal pneumonia  4.  Acute kidney injury on chronic kidney disease  5.  Diabetes mellitus    Plan   -Patient presents with worsening dyspnea cough cold like symptoms for 3 to 4 days presentation prior to arrival.   Significant hypoxemic respiratory failure on presentation  -Viral screen positive for influenza A  -Chest x-ray with evidence of multifocal infiltrates concerning for pneumonia  -IV steroids and gradually wean  -Empiric antibiotic therapy at this time  -Nebulizer treatments  -Wean BiPAP as tolerated  -Further recommendations per nephrology  -Reviewed vitals, labs and imaging    Kay Mane DO  Pulmonary Critical Care Medicine  [facility]  4/2/2024  3:36 PM

## 2024-04-02 NOTE — ED PROVIDER NOTES
[facility]  Emergency Department Attending Note     Chief Complaint:   Chief Complaint   Patient presents with    Body ache and/or chills    Shortness Of Breath     HISTORY OF PRESENT ILLNESS:   Ramón Bedoya is a 74 year old male who presents to the ED with possible history including TBI, hypertension, diabetes, CKD presents for worsening dyspnea over the last for 5 days.  Recent travel to [place].  Has had a cough ever since.  Some chills no fever.  History somewhat limited secondary to clinical condition.     MEDICAL & SOCIAL HISTORY:   Past Medical History:   Diagnosis Date    Chronic kidney disease, stage 4 (severe) (HCC)     Diabetes (HCC)     Essential hypertension     TBI (traumatic brain injury) (HCC)       Past Surgical History:   Procedure Laterality Date    BRAIN SURGERY        Social History     Socioeconomic History    Marital status:    Tobacco Use    Smoking status: Never    Smokeless tobacco: Never   Vaping Use    Vaping Use: Never used   Substance and Sexual Activity    Alcohol use: Not Currently    Drug use: Never      Allergies   Allergen Reactions    Penicillins UNKNOWN      Current Outpatient Medications   Medication Sig Dispense Refill    SPS 15 GM/60ML Oral Suspension Take 120 mL by mouth one time for 1 dose.      sodium bicarbonate 650 MG Oral Tab Take 1 tablet (650 mg total) by mouth 2 (two) times daily.      losartan 25 MG Oral Tab Take 1 tablet (25 mg total) by mouth daily.      levETIRAcetam 500 MG Oral Tab Take 1 tablet (500 mg total) by mouth 2 (two) times daily.      BD PEN NEEDLE MINI U/F 31G X 5 MM Does not apply Misc Take 1 Bottle by mouth As Directed.      furosemide 40 MG Oral Tab Take 1 tablet (40 mg total) by mouth daily.      Continuous Blood Gluc Sensor (FREESTYLE NANDO 2 SENSOR) Does not apply Misc USE 1 SENSOR EVERY 2 WEEKS      Continuous Blood Gluc  (FREESTYLE NANDO 2 READER) Does not apply Device USE EVERY 6 HOURS      atorvastatin 10 MG Oral Tab  Take 1 tablet (10 mg total) by mouth daily.      amiodarone 200 MG Oral Tab Take 1 tablet (200 mg total) by mouth daily.      amLODIPine 10 MG Oral Tab Take 1 tablet (10 mg total) by mouth daily. 30 tablet 0    vancomycin 50 mg/mL Oral Recon Soln Take 2.5 mL (125 mg total) by mouth 4 (four) times daily. 105 mL 0    acetaminophen 500 MG Oral Tab Take 1 tablet (500 mg total) by mouth every 4 (four) hours as needed for Fever or Pain. 120 tablet 0    Naloxone HCl 4 MG/0.1ML Nasal Liquid 4 mg by Nasal route as needed. If patient remains unresponsive, repeat dose in other nostril 2-5 minutes after first dose. 1 kit 0    calcitriol 0.25 MCG Oral Cap Take 1 capsule (0.25 mcg total) by mouth Every Monday, Wednesday, and Friday.      divalproex  MG Oral Tab EC DR tab Take 1 tablet (500 mg total) by mouth 3 (three) times daily.      Ferrous Sulfate 324 (65 Fe) MG Oral Tab EC Take 1 tablet by mouth daily.      sennosides 8.6 MG Oral Tab Take 1 tablet (8.6 mg total) by mouth 2 (two) times daily.            REVIEW OF SYSTEMS   A 10 point review of systems was completed and is negative except as listed in history of present illness      PHYSICAL EXAM   Vitals: /71   Pulse 60   Resp 19   Wt 86.2 kg   SpO2 97%   BMI 33.66 kg/m²   /71   Pulse 60   Resp 19   Wt 86.2 kg   SpO2 97%   BMI 33.66 kg/m²     General: Sleepy but awake alert oriented x 3  Constitutional: Well developed, well nourished, nontoxic  Head: atraumatic, normocephalic   Eyes: conjuctiva clear, no icterus, PERRL, EOMI, vision grossly normal  Ears: normal external appearance, no drainage  Nose:  Atraumatic, no swelling, no drainage, nares patent  Throat: Dry pink mucous membranes, airway is patent  Neck:  Soft supple, no masses, no tracheal deviation, no stridor  Chest:  No bruising or abrasions, no tenderness, no deformity  Cardiac: Tachycardic and regular  Lung: Bilateral crackles noted with some mild distress  Abdomen:  Soft, nontender,  nondistended, normal BS  Extremities: FROM all ext, no deformities, intact equal peripheral pulses, no cyanosis or edema  Neuro: No facial droop, no slurred speech, moving all extremities freely, SILT to the bilateral upper and lower extremities  Psych: A&Ox3, normal affect, cooperative, calm  Skin: No rash, no petechiae/purpura, warm, dry      RESULTS  LABS:   Results for orders placed or performed during the hospital encounter of 04/02/24   Basic Metabolic Panel (8)   Result Value Ref Range    Glucose 214 (H) 70 - 99 mg/dL    Sodium 134 (L) 136 - 145 mmol/L    Potassium 4.6 3.5 - 5.1 mmol/L    Chloride 104 98 - 112 mmol/L    CO2 18.0 (L) 21.0 - 32.0 mmol/L    Anion Gap 12 0 - 18 mmol/L    BUN 77 (H) 9 - 23 mg/dL    Creatinine 5.32 (H) 0.70 - 1.30 mg/dL    BUN/CREA Ratio 14.5 10.0 - 20.0    Calcium, Total 8.6 (L) 8.7 - 10.4 mg/dL    Calculated Osmolality 307 (H) 275 - 295 mOsm/kg    eGFR-Cr 11 (L) >=60 mL/min/1.73m2   Lactic Acid, Plasma   Result Value Ref Range    Lactic Acid 1.5 0.5 - 2.0 mmol/L   Comp Metabolic Panel (14)   Result Value Ref Range    Glucose 215 (H) 70 - 99 mg/dL    Sodium 134 (L) 136 - 145 mmol/L    Potassium 4.6 3.5 - 5.1 mmol/L    Chloride 104 98 - 112 mmol/L    CO2 18.0 (L) 21.0 - 32.0 mmol/L    Anion Gap 12 0 - 18 mmol/L    BUN 79 (H) 9 - 23 mg/dL    Creatinine 5.35 (H) 0.70 - 1.30 mg/dL    BUN/CREA Ratio 14.8 10.0 - 20.0    Calcium, Total 8.5 (L) 8.7 - 10.4 mg/dL    Calculated Osmolality 308 (H) 275 - 295 mOsm/kg    eGFR-Cr 10 (L) >=60 mL/min/1.73m2    ALT 57 (H) 10 - 49 U/L    AST 65 (H) <=34 U/L    Alkaline Phosphatase 100 45 - 117 U/L    Bilirubin, Total 0.7 0.2 - 1.1 mg/dL    Total Protein 7.5 5.7 - 8.2 g/dL    Albumin 4.2 3.2 - 4.8 g/dL    Globulin  3.3 2.8 - 4.4 g/dL    A/G Ratio 1.3 1.0 - 2.0   Troponin I (High Sensitivity)   Result Value Ref Range    Troponin I (High Sensitivity) 68 (HH) <=53 ng/L   BNP (Brain Natriuretic Peptide)   Result Value Ref Range    Beta Natriuretic Peptide  318 (H) <100 pg/mL   Prothrombin Time (PT)   Result Value Ref Range    PT 13.2 11.6 - 14.8 seconds    INR 0.95 0.80 - 1.20   PTT, Activated   Result Value Ref Range    PTT 36.3 (H) 23.3 - 35.6 seconds   Arterial blood gas   Result Value Ref Range    Sample Site Right Brachial     ABG pH 7.34 (L) 7.35 - 7.45    ABG pCO2 34 (L) 35 - 45 mm Hg    ABG PO2 91 80 - 100 mm Hg    ABG HCO3 19.6 (L) 21.0 - 27.0 mEq/L    Blood Gas Base Excess -6.8 (L) -2.0 - 2.0 mmol/L    ABG O2 Saturation 97.8 94.0 - 100.0 %    Oxygen Content 12.2 (L) 15.0 - 23.0 Vol%    Oxygen Delivery Device CPAP/BIPAP     FIO2 70.00     Inspiratory Pressure 10 cm H2O    Expiratory Pressure 5 cm H2O    Modified Allens Test Not Applicable     Puncture Charge Yes     Blood Gas Analyzer [id number] CCU    Lipid Panel   Result Value Ref Range    Cholesterol, Total 135 <200 mg/dL    HDL Cholesterol 44 40 - 59 mg/dL    Triglycerides 83 30 - 149 mg/dL    LDL Cholesterol 75 <100 mg/dL    VLDL 13 0 - 30 mg/dL    Non HDL Chol 91 <130 mg/dL   EKG 12 Lead   Result Value Ref Range    Ventricular rate 73 BPM    Atrial rate 73 BPM    P-R Interval  ms    QRS Duration 108 ms    Q-T Interval 458 ms    QTC Calculation (Bezet) 504 ms    P Axis  degrees    R Axis 7 degrees    T Axis 64 degrees   POCT Glucose   Result Value Ref Range    POC Glucose  223 (H) 70 - 99 mg/dL   RAINBOW DRAW LAVENDER   Result Value Ref Range    Hold Lavender Auto Resulted    RAINBOW DRAW LIGHT GREEN   Result Value Ref Range    Hold Lt Green Auto Resulted    SARS-CoV-2/Flu A and B/RSV by PCR (GeneXpert)    Specimen: Nares; Other   Result Value Ref Range    SARS-CoV-2 (COVID-19) - (GeneXpert) Not Detected Not Detected    Influenza A by PCR Positive (A) Negative    Influenza B by PCR Negative Negative    RSV by PCR Negative Negative   CBC W/ DIFFERENTIAL   Result Value Ref Range    WBC 11.0 4.0 - 11.0 x10(3) uL    RBC 2.88 (L) 3.80 - 5.80 x10(6)uL    HGB 8.9 (L) 13.0 - 17.5 g/dL    HCT 26.4 (L) 39.0 - 53.0 %     MCV 91.7 80.0 - 100.0 fL    MCH 30.9 26.0 - 34.0 pg    MCHC 33.7 31.0 - 37.0 g/dL    RDW-SD 47.5 (H) 35.1 - 46.3 fL    RDW 14.0 11.0 - 15.0 %    .0 150.0 - 450.0 10(3)uL    Neutrophil Absolute Prelim 9.72 (H) 1.50 - 7.70 x10 (3) uL    Neutrophil Absolute 9.72 (H) 1.50 - 7.70 x10(3) uL    Lymphocyte Absolute 0.49 (L) 1.00 - 4.00 x10(3) uL    Monocyte Absolute 0.76 0.10 - 1.00 x10(3) uL    Eosinophil Absolute 0.00 0.00 - 0.70 x10(3) uL    Basophil Absolute 0.02 0.00 - 0.20 x10(3) uL    Immature Granulocyte Absolute 0.04 0.00 - 1.00 x10(3) uL    Neutrophil % 88.1 %    Lymphocyte % 4.4 %    Monocyte % 6.9 %    Eosinophil % 0.0 %    Basophil % 0.2 %    Immature Granulocyte % 0.4 %         IMAGING: XR CHEST AP PORTABLE  (CPT=71045)    Result Date: 4/2/2024  CONCLUSION: Multifocal pneumonia.    Dictated by (CST): Nikos Putnam MD on 4/02/2024 at 1:14 PM     Finalized by (CST): Nikos Putnam MD on 4/02/2024 at 1:15 PM           I personally reviewed the radiology study and my finding were x-ray with multifocal pneumonia      Procedures:   Procedures    EKG: Sinus rhythm with a normal rate of 73, nonspecific ST-T wave changes without overt signs of acute ischemia, borderline QT, no old immediately available     ED COURSE          Re-Evaluation: Improved      Disposition & Plan:   Clinical Impression/Final Diagnosis:   1. Dyspnea, unspecified type        Medical Decision Making: The workup is concerning for multifocal pneumonia patient did test positive for influenza outside of the window for Tamiflu, antibiotics given, will give gentle fluid resuscitation given propensity for fluid overload and admit for further workup and management.  Patient immediately started on positive pressure ventilation on arrival to the room as he did arrive in some respiratory distress responded well to respiratory resuscitation.  I did note the patient's renal function, family states that the patient does not want dialysis I did discuss  with nephrology, will place pulmonology on consult as well    Medical Decision Making  Amount and/or Complexity of Data Reviewed  Independent Historian:      Details: Family at the bedside  External Data Reviewed: notes.  Labs: ordered. Decision-making details documented in ED Course.  Radiology: ordered and independent interpretation performed. Decision-making details documented in ED Course.  ECG/medicine tests: ordered and independent interpretation performed. Decision-making details documented in ED Course.    Risk  OTC drugs.  Prescription drug management.  Decision regarding hospitalization.    Critical Care  Total time providing critical care: 42 minutes        Disposition: Admit  There are no disposition comments on file for this visit.     This note was generated in part using voice recognition dictation technology. The report was reviewed by this physician but still may have unintentional errors due to inherent limitations of voice recognition technology. All times are estimates.

## 2024-04-02 NOTE — PLAN OF CARE
Pt admitted to the floor on continuous Bipap. Family at bedside. Frequent rounding by staff, family updated on plan of care. Bed locked and in low position. Call light within reach.  Problem: Diabetes/Glucose Control  Goal: Glucose maintained within prescribed range  Description: INTERVENTIONS:  - Monitor Blood Glucose as ordered  - Assess for signs and symptoms of hyperglycemia and hypoglycemia  - Administer ordered medications to maintain glucose within target range  - Assess barriers to adequate nutritional intake and initiate nutrition consult as needed  - Instruct patient on self management of diabetes  Outcome: Progressing     Problem: RESPIRATORY - ADULT  Goal: Achieves optimal ventilation and oxygenation  Description: INTERVENTIONS:  - Assess for changes in respiratory status  - Assess for changes in mentation and behavior  - Position to facilitate oxygenation and minimize respiratory effort  - Oxygen supplementation based on oxygen saturation or ABGs  - Provide Smoking Cessation handout, if applicable  - Encourage broncho-pulmonary hygiene including cough, deep breathe, Incentive Spirometry  - Assess the need for suctioning and perform as needed  - Assess and instruct to report SOB or any respiratory difficulty  - Respiratory Therapy support as indicated  - Manage/alleviate anxiety  - Monitor for signs/symptoms of CO2 retention  Outcome: Progressing     Problem: SAFETY ADULT - FALL  Goal: Free from fall injury  Description: INTERVENTIONS:  - Assess pt frequently for physical needs  - Identify cognitive and physical deficits and behaviors that affect risk of falls.  - [place] fall precautions as indicated by assessment.  - Educate pt/family on patient safety including physical limitations  - Instruct pt to call for assistance with activity based on assessment  - Modify environment to reduce risk of injury  - Provide assistive devices as appropriate  - Consider OT/PT consult to assist with  strengthening/mobility  - Encourage toileting schedule  Outcome: Progressing

## 2024-04-02 NOTE — ED INITIAL ASSESSMENT (HPI)
Patient brought immediately to room 48, noted to be 60% on room air. Family brought him here for hyperglycemia concerns and chills.

## 2024-04-02 NOTE — CONSULTS
[facility]  part of [facility]    Report of Consultation    Ramón Bedoya Patient Status:  Emergency    3/21/1950 MRN [id number]   Location [facility] EMERGENCY DEPARTMENT Attending Booker Brooks MD   Hosp Day # 0 PCP LEONEL CORTES     Date of Admission:  2024  Date of Consult:  2024   Reason for Consultation:   LG    History of Present Illness:   Patient is a 74 year old male who was admitted to the hospital for chills and difficulty breathing.    Upon arrival to the emergency room he was 60% on room air.    The patient has advanced chronic kidney disease, he follows up with [facility].  His GFR typically runs around 14 according to his daughters who are at the bedside    The patient's daughters states that the patient himself has decided he will not pursue dialysis, and he is not been preemptively preparing for it.  They have had this agreement with his nephrologist.    His creatinine is up to 5.3 today, and GFR is down to 10.  The patient is currently on BiPAP    Past Medical History  Past Medical History:   Diagnosis Date    Chronic kidney disease, stage 4 (severe) (HCC)     Diabetes (HCC)     Essential hypertension     TBI (traumatic brain injury) (HCC)        Past Surgical History  Past Surgical History:   Procedure Laterality Date    BRAIN SURGERY         Family History  No family history on file.    Social History  Social History     Socioeconomic History    Marital status:    Tobacco Use    Smoking status: Never    Smokeless tobacco: Never   Vaping Use    Vaping Use: Never used   Substance and Sexual Activity    Alcohol use: Not Currently    Drug use: Never       Current Medications:  Current Facility-Administered Medications   Medication Dose Route Frequency    doxycycline hyclate (Vibramycin) 100 mg in sodium chloride 0.9% 100 mL IVPB  100 mg Intravenous Once     (Not in a hospital admission)      Allergies  Allergies   Allergen Reactions     Penicillins UNKNOWN       Review of Systems:     General: weak        A comprehensive 12 point review of systems was completed.  Pertinent positives as above and all the rest were negative.     Physical Exam:   /64   Pulse 63   Resp 18   Wt 190 lb (86.2 kg)   SpO2 99%   BMI 33.66 kg/m²    No intake or output data in the 24 hours ending 04/02/24 1317  Wt Readings from Last 1 Encounters:   04/02/24 190 lb (86.2 kg)       Exam  Gen: No acute distress  Heent: NC AT, mucous memb clear, neck supple  Pulm: Lungs coarse, normal respiratory effort  CV: Heart with regular rate and rhythm, edema  Abd: Abdomen soft, nontender, nondistended, no organomegaly, bowel sounds present  Skin: no symptoms reported  Psych: unable to assess        Results:     Laboratory Data:  Recent Labs   Lab 04/02/24  1116   RBC 2.88*   HGB 8.9*   HCT 26.4*   MCV 91.7   MCH 30.9   MCHC 33.7   RDW 14.0   NEPRELIM 9.72*   WBC 11.0   .0         Recent Labs   Lab 04/02/24  1116 04/02/24  1122   * 215*   BUN 77* 79*   CREATSERUM 5.32* 5.35*   CA 8.6* 8.5*   * 134*   K 4.6 4.6    104   CO2 18.0* 18.0*        Imaging:  XR CHEST AP PORTABLE  (CPT=71045)    Result Date: 4/2/2024  CONCLUSION: Multifocal pneumonia.    Dictated by (CST): Nikos Putnam MD on 4/02/2024 at 1:14 PM     Finalized by (CST): Nikos Putnam MD on 4/02/2024 at 1:15 PM                   Impression/Receommendations:     1 -LG  GFR is running much worse than his baseline.  I explained this to his daughters at the bedside.    This could be due to his acute septic illness/respiratory failure.  He also appears to be slightly prerenal.    Will monitor his renal function.  However I will respect his wishes of not wanting to pursue dialysis    2 -respiratory failure/influenza  Supportive care.  Currently on BiPAP    3 -hypertension with CKD  Blood pressure is running low.  Would advise to hold his losartan    4 - DM 2 w nephropathy  Accu-Cheks  Thank you for  allowing me to participate in the care of your patient.    ABHINAV GOMES MD  4/2/2024

## 2024-04-03 LAB
ALBUMIN SERPL-MCNC: 3.4 G/DL (ref 3.2–4.8)
ALBUMIN SERPL-MCNC: 3.6 G/DL (ref 3.2–4.8)
ALP LIVER SERPL-CCNC: 77 U/L
ALT SERPL-CCNC: 50 U/L
ANION GAP SERPL CALC-SCNC: 12 MMOL/L (ref 0–18)
ANTIBODY SCREEN: NEGATIVE
AST SERPL-CCNC: 55 U/L (ref ?–34)
BASOPHILS # BLD AUTO: 0 X10(3) UL (ref 0–0.2)
BASOPHILS NFR BLD AUTO: 0 %
BILIRUB DIRECT SERPL-MCNC: 0.2 MG/DL (ref ?–0.3)
BILIRUB SERPL-MCNC: 0.5 MG/DL (ref 0.2–1.1)
BUN BLD-MCNC: 96 MG/DL (ref 9–23)
BUN/CREAT SERPL: 16.2 (ref 10–20)
CALCIUM BLD-MCNC: 7.6 MG/DL (ref 8.7–10.4)
CHLORIDE SERPL-SCNC: 108 MMOL/L (ref 98–112)
CO2 SERPL-SCNC: 18 MMOL/L (ref 21–32)
CREAT BLD-MCNC: 5.93 MG/DL
DEPRECATED HBV CORE AB SER IA-ACNC: 177.7 NG/ML
DEPRECATED RDW RBC AUTO: 47 FL (ref 35.1–46.3)
EGFRCR SERPLBLD CKD-EPI 2021: 9 ML/MIN/1.73M2 (ref 60–?)
EOSINOPHIL # BLD AUTO: 0 X10(3) UL (ref 0–0.7)
EOSINOPHIL NFR BLD AUTO: 0 %
ERYTHROCYTE [DISTWIDTH] IN BLOOD BY AUTOMATED COUNT: 14.1 % (ref 11–15)
GLUCOSE BLD-MCNC: 126 MG/DL (ref 70–99)
GLUCOSE BLDC GLUCOMTR-MCNC: 145 MG/DL (ref 70–99)
GLUCOSE BLDC GLUCOMTR-MCNC: 216 MG/DL (ref 70–99)
GLUCOSE BLDC GLUCOMTR-MCNC: 270 MG/DL (ref 70–99)
GLUCOSE BLDC GLUCOMTR-MCNC: 340 MG/DL (ref 70–99)
HCT VFR BLD AUTO: 21 %
HGB BLD-MCNC: 6.8 G/DL
HGB BLD-MCNC: 8.6 G/DL
IMM GRANULOCYTES # BLD AUTO: 0.04 X10(3) UL (ref 0–1)
IMM GRANULOCYTES NFR BLD: 0.7 %
IRON SATN MFR SERPL: 12 %
IRON SERPL-MCNC: 20 UG/DL
L PNEUMO AG UR QL: NEGATIVE
LYMPHOCYTES # BLD AUTO: 0.5 X10(3) UL (ref 1–4)
LYMPHOCYTES NFR BLD AUTO: 8.3 %
MCH RBC QN AUTO: 29.6 PG (ref 26–34)
MCHC RBC AUTO-ENTMCNC: 32.4 G/DL (ref 31–37)
MCV RBC AUTO: 91.3 FL
MONOCYTES # BLD AUTO: 0.11 X10(3) UL (ref 0.1–1)
MONOCYTES NFR BLD AUTO: 1.8 %
NEUTROPHILS # BLD AUTO: 5.38 X10 (3) UL (ref 1.5–7.7)
NEUTROPHILS # BLD AUTO: 5.38 X10(3) UL (ref 1.5–7.7)
NEUTROPHILS NFR BLD AUTO: 89.2 %
OSMOLALITY SERPL CALC.SUM OF ELEC: 317 MOSM/KG (ref 275–295)
PHOSPHATE SERPL-MCNC: 7.5 MG/DL (ref 2.4–5.1)
PLATELET # BLD AUTO: 171 10(3)UL (ref 150–450)
POTASSIUM SERPL-SCNC: 5.1 MMOL/L (ref 3.5–5.1)
PROT SERPL-MCNC: 6.3 G/DL (ref 5.7–8.2)
RBC # BLD AUTO: 2.3 X10(6)UL
RH BLOOD TYPE: POSITIVE
SODIUM SERPL-SCNC: 138 MMOL/L (ref 136–145)
STREP PNEUMO ANTIGEN, URINE: NEGATIVE
TIBC SERPL-MCNC: 171 UG/DL (ref 250–425)
TRANSFERRIN SERPL-MCNC: 115 MG/DL (ref 215–365)
TROPONIN I SERPL HS-MCNC: 174 NG/L
WBC # BLD AUTO: 6 X10(3) UL (ref 4–11)

## 2024-04-03 PROCEDURE — 30233N1 TRANSFUSION OF NONAUTOLOGOUS RED BLOOD CELLS INTO PERIPHERAL VEIN, PERCUTANEOUS APPROACH: ICD-10-PCS | Performed by: HOSPITALIST

## 2024-04-03 PROCEDURE — 99232 SBSQ HOSP IP/OBS MODERATE 35: CPT | Performed by: INTERNAL MEDICINE

## 2024-04-03 PROCEDURE — 99233 SBSQ HOSP IP/OBS HIGH 50: CPT | Performed by: HOSPITALIST

## 2024-04-03 PROCEDURE — 99233 SBSQ HOSP IP/OBS HIGH 50: CPT | Performed by: INTERNAL MEDICINE

## 2024-04-03 RX ORDER — SODIUM CHLORIDE 9 MG/ML
INJECTION, SOLUTION INTRAVENOUS ONCE
Status: COMPLETED | OUTPATIENT
Start: 2024-04-03 | End: 2024-04-03

## 2024-04-03 NOTE — CM/SW NOTE
04/03/24 1700   CM/SW Referral Data   Referral Source    Reason for Referral Discharge planning   Informant Patient;Spouse/Significant Other   Medical Hx   Does patient have an established PCP? Yes  (Bishnu Osborne)   Patient Info   Patient's Current Mental Status at Time of Assessment Alert;Oriented   Patient's Home Environment House   Patient lives with Spouse/Significant other   Patient Status Prior to Admission   Independent with ADLs and Mobility Yes   Discharge Needs   Anticipated D/C needs To be determined     Pt discussed during nursing rounds. Dx respiratory failure 2/2 PNA and influenza A, on 10L O2 (no home O2). Home w/spouse, independent and active w/o deice at baseline. PT/OT evals requested for dc recommendation. SW/CM will need to monitor O2 needs for possible home O2 at dc.    Plan: TBD    / to remain available for support and/or discharge planning.     ADALBERTO Zhu    [phone]

## 2024-04-03 NOTE — PROGRESS NOTES
[facility]  part of [facility]    Progress Note    Ramón Bedoya Patient Status:  Inpatient    3/21/1950 MRN [id number]   Location [facility]5W Attending Aníbal Najera MD   Hosp Day # 1 PCP LEONEL CORTES       Subjective:   Ramón Bedoya is a(n) 74 year old male who I am seeing for LG      Resting    Objective:   /56 (BP Location: Right arm)   Pulse (!) 49   Temp 97.8 °F (36.6 °C) (Axillary)   Resp 12   Wt 194 lb 6.4 oz (88.2 kg)   SpO2 91%   BMI 34.44 kg/m²      Intake/Output Summary (Last 24 hours) at 4/3/2024 1045  Last data filed at 4/3/2024 1000  Gross per 24 hour   Intake 558 ml   Output 300 ml   Net 258 ml     Wt Readings from Last 1 Encounters:   24 194 lb 6.4 oz (88.2 kg)       Exam  Gen: No acute distress, Heent: NC AT, mucous memb clear, neck supple  Pulm: Lungs clear, normal respiratory effort  CV: Heart with regular rate and rhythm, no edema  Abd: Abdomen soft, nontender, nondistended, no organomegaly, bowel sounds present  Skin: no symptoms reported  Psych: alert and oriented    Assessment and Plan:     1 -LG  GFR is running much worse than his baseline.  I explained this to his daughters at the bedside yesterday and to the patient today     This could be due to his acute septic illness/respiratory failure.  He also appears to be slightly prerenal.     Will monitor his renal function.  However I will respect his wishes of not wanting to pursue dialysis    Due to his kidney function gets worse in the next few days, he will need to pursue comfort care/hospice     2 -respiratory failure/influenza  Supportive care.      3 -hypertension with CKD  Blood pressure is running low.  Would advise to hold his losartan     4 - DM 2 w nephropathy  Accu-Cheks    5 -anemia  To receive PRBC today        Results:     Recent Labs   Lab 24  1116 24  0815   RBC 2.88* 2.30*   HGB 8.9* 6.8*   HCT 26.4* 21.0*   MCV 91.7 91.3   MCH 30.9 29.6   MCHC 33.7 32.4   RDW 14.0  14.1   NEPRELIM 9.72* 5.38   WBC 11.0 6.0   .0 171.0         Recent Labs   Lab 04/02/24  1116 04/02/24  1122 04/03/24  0815   * 215* 126*   BUN 77* 79* 96*   CREATSERUM 5.32* 5.35* 5.93*   CA 8.6* 8.5* 7.6*   * 134* 138   K 4.6 4.6 5.1    104 108   CO2 18.0* 18.0* 18.0*          XR CHEST AP PORTABLE  (CPT=71045)    Result Date: 4/2/2024  CONCLUSION: Multifocal pneumonia.    Dictated by (CST): Nikos Putnam MD on 4/02/2024 at 1:14 PM     Finalized by (CST): Nikos Putnam MD on 4/02/2024 at 1:15 PM               ABHINAV GOMES MD  4/3/2024

## 2024-04-03 NOTE — PROGRESS NOTES
[facility]  part of [facility]     Progress Note    Ramón Bedoya Patient Status:  Inpatient    3/21/1950 MRN [id number]   Location [facility]5W Attending Aníbal Najera MD   Hosp Day # 1 PCP LEONEL CORTES       Subjective:   Patient seen and examined.  Resting in bed.  Overall improvement dyspnea.  Some ongoing cough present    Objective:   Blood pressure 126/65, pulse 65, temperature 97.7 °F (36.5 °C), temperature source Oral, resp. rate 16, weight 194 lb 6.4 oz (88.2 kg), SpO2 92%.  Intake/Output:   Last 3 shifts: I/O last 3 completed shifts:  In: 440 [I.V.:340; IV PIGGYBACK:100]  Out: 300 [Urine:300]   This shift: I/O this shift:  In: 118 [P.O.:118]  Out: -      Vent Settings:      Hemodynamic parameters (last 24 hours):      Scheduled Meds:   Current Facility-Administered Medications   Medication Dose Route Frequency    ipratropium-albuterol (Duoneb) 0.5-2.5 (3) MG/3ML inhalation solution 3 mL  3 mL Nebulization Q6H PRN    methylPREDNISolone sodium succinate (Solu-MEDROL) injection 40 mg  40 mg Intravenous Q8H    glucose (Dex4) 15 GM/59ML oral liquid 15 g  15 g Oral Q15 Min PRN    Or    glucose (Glutose) 40% oral gel 15 g  15 g Oral Q15 Min PRN    Or    glucose-vitamin C (Dex-4) chewable tab 4 tablet  4 tablet Oral Q15 Min PRN    Or    dextrose 50% injection 50 mL  50 mL Intravenous Q15 Min PRN    Or    glucose (Dex4) 15 GM/59ML oral liquid 30 g  30 g Oral Q15 Min PRN    Or    glucose (Glutose) 40% oral gel 30 g  30 g Oral Q15 Min PRN    Or    glucose-vitamin C (Dex-4) chewable tab 8 tablet  8 tablet Oral Q15 Min PRN    insulin aspart (NovoLOG) 100 Units/mL FlexPen 1-11 Units  1-11 Units Subcutaneous TID CC and HS    heparin (Porcine) 5000 UNIT/ML injection 5,000 Units  5,000 Units Subcutaneous 2 times per day    acetaminophen (Tylenol Extra Strength) tab 500 mg  500 mg Oral Q4H PRN    ondansetron (Zofran) 4 MG/2ML injection 4 mg  4 mg Intravenous Q6H PRN    metoclopramide (Reglan) 5  mg/mL injection 5 mg  5 mg Intravenous Q8H PRN    temazepam (Restoril) cap 15 mg  15 mg Oral Nightly PRN    guaiFENesin (Robitussin) 100 MG/5 ML oral liquid 200 mg  200 mg Oral Q4H PRN    benzonatate (Tessalon) cap 200 mg  200 mg Oral TID PRN    azithromycin (Zithromax) 500 mg in sodium chloride 0.9% 250mL IVPB premix  500 mg Intravenous Q24H    cefTRIAXone (Rocephin) 1 g in D5W 100 mL IVPB-ADD  1 g Intravenous Q24H    vancomycin (Vancocin) cap 125 mg  125 mg Oral Daily    amiodarone (Pacerone) tab 200 mg  200 mg Oral Daily    amLODIPine (Norvasc) tab 10 mg  10 mg Oral Daily    atorvastatin (Lipitor) tab 10 mg  10 mg Oral Daily    calcitriol (Rocaltrol) cap 0.25 mcg  0.25 mcg Oral Q MWF    furosemide (Lasix) tab 40 mg  40 mg Oral Daily    hydrALAZINE (Apresoline) tab 25 mg  25 mg Oral TID    insulin degludec 100 units/mL flextouch 6 Units  6 Units Subcutaneous Nightly    [Held by provider] losartan (Cozaar) tab 25 mg  25 mg Oral Daily    sodium bicarbonate tab 650 mg  650 mg Oral BID    levETIRAcetam (Keppra) 500 mg/5mL injection 500 mg  500 mg Intravenous Q12H    sodium chloride 0.9% infusion   Intravenous Continuous       Continuous Infusions:    sodium chloride Stopped (04/03/24 1116)       Physical Exam  Constitutional: no acute distress  Eyes: PERRL  ENT: nares pateint  Neck: supple, no JVD  Cardio: RRR, S1 S2  Respiratory: Scattered Rales  GI: abdomen soft, non tender, active bowel sounds, no organomegaly  Extremities: no clubbing, cyanosis, edema  Neurologic: no gross motor deficits  Skin: warm, dry      Results:     Lab Results   Component Value Date    WBC 6.0 04/03/2024    HGB 6.8 04/03/2024    HCT 21.0 04/03/2024    .0 04/03/2024    CREATSERUM 5.93 04/03/2024    BUN 96 04/03/2024     04/03/2024    K 5.1 04/03/2024     04/03/2024    CO2 18.0 04/03/2024     04/03/2024    CA 7.6 04/03/2024    ALB 3.6 04/03/2024    PHOS 7.5 04/03/2024       XR CHEST AP PORTABLE  (CPT=71045)    Result  Date: 4/2/2024  CONCLUSION: Multifocal pneumonia.    Dictated by (CST): Nikos Putnam MD on 4/02/2024 at 1:14 PM     Finalized by (CST): Nikos Putnam MD on 4/02/2024 at 1:15 PM           EKG 12 Lead    Result Date: 4/2/2024  Possible Atrial fibrillation Prolonged QT interval or tu fusion, consider myocardial disease, electrolyte imbalance, or drug effects Marked baseline artifact Abnormal ECG When compared with ECG of 19-AUG-2023 12:04, QRS duration has increased Nonspecific T wave abnormality no longer evident in Inferior leads Confirmed by JAVIER NOVA ([id number]) on 4/2/2024 2:44:21 PM     Assessment   1.  Acute hypoxemic respiratory failure  2.  Influenza A  3.  Multifocal pneumonia  4.  Acute kidney injury on chronic kidney disease  5.  Diabetes mellitus  6.  Anemia       Plan     -Patient presents with worsening dyspnea cough cold like symptoms for 3 to 4 days presentation prior to arrival.  Significant hypoxemic respiratory failure on presentation  -Viral screen positive for influenza A  -Chest x-ray with evidence of multifocal infiltrates concerning for pneumonia  -IV steroids and gradually wean  -Empiric antibiotic therapy at this time  -Blood cultures pending  -Weaned off BiPAP today.  Currently on 10 L.  Wean as tolerated.  -Nebulizer treatments  -Wean BiPAP as tolerated  -1 unit PRBC transfusion today  -Further recommendations per nephrology  -Patient made DNR/DNI.    Kay Mane DO  Pulmonary Critical Care Medicine  [facility]

## 2024-04-03 NOTE — PROGRESS NOTES
[facility]  part of [facility]    Progress Note    Ramón Bedoya Patient Status:  Inpatient    3/21/1950 MRN [id number]   Location [facility]5W Attending Aníbal Najera MD   Hosp Day # 1 PCP LEONEL CORTES       Subjective:   Ramón Bedoya has no resting respiratory distress on BiPAP    Review of Systems:   10 point ROS completed and was negative, except for pertinent positive and negatives stated in subjective.    Objective:     Vitals:    24 0040 24 0450 24 0529 24 0801   BP:   109/57 115/56   BP Location:   Left arm Right arm   Pulse:   (!) 45 (!) 49   Resp: 18 15 13 12   Temp:   98 °F (36.7 °C) 97.8 °F (36.6 °C)   TempSrc:   Axillary Axillary   SpO2:   98% 93%   Weight:   194 lb 6.4 oz (88.2 kg)      Patient Weight for the past 72 hrs:   Weight   24 1107 190 lb (86.2 kg)   24 1653 193 lb 9.6 oz (87.8 kg)   24 0529 194 lb 6.4 oz (88.2 kg)       Intake/Output Summary (Last 24 hours) at 4/3/2024 0842  Last data filed at 4/3/2024 0823  Gross per 24 hour   Intake 440 ml   Output 300 ml   Net 140 ml       GENERAL:  The patient appeared to be in mild distress  SKIN:  Warm and hydrated  HEENT:  Head was atraumatic and normocephalic.    CHEST:  Symmetrical movement on inspiration  LUNGS:  No audible wheezing  ABDOMEN: Non-distended  MUSCULOSKELETAL:  There was no deformity.   EXTREMITIES: There was no edema  NEUROLOGICAL:  There was no focal deficit.    PSYCHIATRIC: Calm and cooperative     Current Scheduled Inpatient Meds:      methylPREDNISolone  40 mg Intravenous Q8H    insulin aspart  1-11 Units Subcutaneous TID CC and HS    heparin  5,000 Units Subcutaneous 2 times per day    azithromycin  500 mg Intravenous Q24H    cefTRIAXone  1 g Intravenous Q24H    vancomycin  125 mg Oral Daily    amiodarone  200 mg Oral Daily    amLODIPine  10 mg Oral Daily    atorvastatin  10 mg Oral Daily    calcitriol  0.25 mcg Oral Q MWF    furosemide  40 mg Oral Daily     hydrALAZINE  25 mg Oral TID    insulin degludec  6 Units Subcutaneous Nightly    [Held by provider] losartan  25 mg Oral Daily    sodium bicarbonate  650 mg Oral BID    levETIRAcetam  500 mg Intravenous Q12H       Current PRN Inpatient Meds:      ipratropium-albuterol, glucose **OR** glucose **OR** glucose-vitamin C **OR** dextrose **OR** glucose **OR** glucose **OR** glucose-vitamin C, acetaminophen, ondansetron, metoclopramide, temazepam, guaiFENesin, benzonatate    Results:     Lab Results   Component Value Date    WBC 11.0 04/02/2024    HGB 8.9 (L) 04/02/2024    HCT 26.4 (L) 04/02/2024    .0 04/02/2024    CREATSERUM 5.35 (H) 04/02/2024    BUN 79 (H) 04/02/2024     (L) 04/02/2024    K 4.6 04/02/2024     04/02/2024    CO2 18.0 (L) 04/02/2024     (H) 04/02/2024    CA 8.5 (L) 04/02/2024    ALB 4.2 04/02/2024    ALKPHO 100 04/02/2024    BILT 0.7 04/02/2024    TP 7.5 04/02/2024    AST 65 (H) 04/02/2024    ALT 57 (H) 04/02/2024    PTT 36.3 (H) 04/02/2024    INR 0.95 04/02/2024    MG 2.7 (H) 08/23/2023    PHOS 2.7 08/23/2023       Recent Labs   Lab 04/02/24  1116   RBC 2.88*   HGB 8.9*   HCT 26.4*   MCV 91.7   MCH 30.9   MCHC 33.7   RDW 14.0   NEPRELIM 9.72*   WBC 11.0   .0     Recent Labs   Lab 04/02/24  1116 04/02/24  1122   * 215*   BUN 77* 79*   CREATSERUM 5.32* 5.35*   CA 8.6* 8.5*   ALB  --  4.2   * 134*   K 4.6 4.6    104   CO2 18.0* 18.0*   ALKPHO  --  100   AST  --  65*   ALT  --  57*   BILT  --  0.7   TP  --  7.5     Lab Results   Component Value Date    INR 0.95 04/02/2024    INR 0.99 08/16/2023       Culture:  No results found for this visit on 04/02/24.    Imaging/EKG:   XR CHEST AP PORTABLE  (CPT=71045)    Result Date: 4/2/2024  CONCLUSION: Multifocal pneumonia.    Dictated by (CST): Nikos Putnam MD on 4/02/2024 at 1:14 PM     Finalized by (CST): Nikos Putnam MD on 4/02/2024 at 1:15 PM         EKG 12 Lead    Result Date: 4/2/2024  Possible Atrial  fibrillation Prolonged QT interval or tu fusion, consider myocardial disease, electrolyte imbalance, or drug effects Marked baseline artifact Abnormal ECG When compared with ECG of 19-AUG-2023 12:04, QRS duration has increased Nonspecific T wave abnormality no longer evident in Inferior leads Confirmed by JAVIER NOVA ([id number]) on 4/2/2024 2:44:21 PM   Assessment and Plan:   The patient is a 74-year-old  male with history of CKD4, HTN, HLD and DM2, who p/w wheezing and shortness of breath for 3 days.    # Multifocal pneumonia   # Influenza A infection  # Acute hypoxemic respiratory failure   - Influenza A positive on arrival, hold Tamiflu since patient has been symptomatic for 3 days.  - CXR has bilateral opacities  - IV steroids  - ceftriaxone (4/2- ), azithromycin (4/2- )  - on BiPAP  - Pulm following    # Acute kidney injury on CKD4  # Metabolic acidosis  # Hyponatremia  # Hypertensive nephropathy  # Diabetic nephropathy  - baseline Cr around 3-3.5, up to 5.3 on arrival  - NS 75 ml/h  - Renal following    # Anemia of chronic kidney disease  - hgb baseline 8-9  - hgb 8.9 on arrival, down to 6.8 today with NS 75ml/h overnight, 1U PRBC ordered  - check iron and ferritin before transfusion    # Elevated troponin  - troponin 68 -> 110  - continue to trend troponin    # Bradycardia  - HR around 50, lowest 45  - no BB, CCB or clonidine, continue to monitor on Tele    # DM2  - A1c 6.6  - on degludec 6U at bedtime, SSI AC/HS    # HTN  # HLD  - continue home meds, except losartan    Diet: regular  PT/OT: deferred  DVT ppx: heparin  Code status: DNR  Dispo: per clinical course    MDM: high Aníbal Najera MD, PhD  Message via Secure Chat  [facility]

## 2024-04-03 NOTE — PROGRESS NOTES
Pt on continuous Bipap. He appears comfortable, denies any shortness of breath. Will trial on nasal cannula and monitor. Discussed with Dr. Mane, Dr. Najera, and RT.   Pt also with hemoglobin on 6.8, denies any bleeding. States stool is dark, but not new. Transfuse 1 unit PRBCs.     Discussed with patient at the bedside his goals for 20 minutes. He understands his kidneys are failing and does not want HD. He would want to be peaceful and natural. We discussed if he needed more invasive respiratory support and heroic measures. He stated he would want to be comfortable and die naturally. Will update code status to [facility]. I will follow up with family when they are present at the bedside. Will consult Palliative to follow patient.

## 2024-04-03 NOTE — PLAN OF CARE
Pt weaned from bipap, wife at bedside and daughters called updated on plan of care. Pt received 1unit of RBC. Fluids discontinued d/t patient eating and drinking. Palliative placed on consult, tamiflu discussed with doctor and tucker MAURER. Frequent rounding by staff      Problem: Diabetes/Glucose Control  Goal: Glucose maintained within prescribed range  Description: INTERVENTIONS:  - Monitor Blood Glucose as ordered  - Assess for signs and symptoms of hyperglycemia and hypoglycemia  - Administer ordered medications to maintain glucose within target range  - Assess barriers to adequate nutritional intake and initiate nutrition consult as needed  - Instruct patient on self management of diabetes  Outcome: Progressing     Problem: RESPIRATORY - ADULT  Goal: Achieves optimal ventilation and oxygenation  Description: INTERVENTIONS:  - Assess for changes in respiratory status  - Assess for changes in mentation and behavior  - Position to facilitate oxygenation and minimize respiratory effort  - Oxygen supplementation based on oxygen saturation or ABGs  - Provide Smoking Cessation handout, if applicable  - Encourage broncho-pulmonary hygiene including cough, deep breathe, Incentive Spirometry  - Assess the need for suctioning and perform as needed  - Assess and instruct to report SOB or any respiratory difficulty  - Respiratory Therapy support as indicated  - Manage/alleviate anxiety  - Monitor for signs/symptoms of CO2 retention  Outcome: Progressing     Problem: SAFETY ADULT - FALL  Goal: Free from fall injury  Description: INTERVENTIONS:  - Assess pt frequently for physical needs  - Identify cognitive and physical deficits and behaviors that affect risk of falls.  - [place] fall precautions as indicated by assessment.  - Educate pt/family on patient safety including physical limitations  - Instruct pt to call for assistance with activity based on assessment  - Modify environment to reduce risk of injury  - Provide  assistive devices as appropriate  - Consider OT/PT consult to assist with strengthening/mobility  - Encourage toileting schedule  Outcome: Progressing     Problem: GENITOURINARY - ADULT  Goal: Absence of urinary retention  Description: INTERVENTIONS:  - Assess patient’s ability to void and empty bladder  - Monitor intake/output and perform bladder scan as needed  - Follow urinary retention protocol/standard of care  - Consider collaborating with pharmacy to review patient's medication profile  - Implement strategies to promote bladder emptying  Outcome: Progressing     Problem: NEUROLOGICAL - ADULT  Goal: Achieves stable or improved neurological status  Description: INTERVENTIONS  - Assess for and report changes in neurological status  - Initiate measures to prevent increased intracranial pressure  - Maintain blood pressure and fluid volume within ordered parameters to optimize cerebral perfusion and minimize risk of hemorrhage  - Monitor temperature, glucose, and sodium. Initiate appropriate interventions as ordered  Outcome: Progressing  Goal: Absence of seizures  Description: INTERVENTIONS  - Monitor for seizure activity  - Administer anti-seizure medications as ordered  - Monitor neurological status  Outcome: Progressing     Problem: Patient/Family Goals  Goal: Patient/Family Long Term Goal  Description: Patient's Long Term Goal: respiratory relief    Interventions:  - continuous bipap  -frequent staff rounding  -respiratory meds  -monitor O2 sats  - See additional Care Plan goals for specific interventions  Outcome: Progressing  Goal: Patient/Family Short Term Goal  Description: Patient's Short Term Goal: discharge home    Interventions:   - Monitor vitals  - Monitor appropriate labs  - Administer medications as ordered  - Follow MD's orders  - Update patient on plan of care   - Discharge planning     - See additional Care Plan goals for specific interventions  Outcome: Progressing

## 2024-04-03 NOTE — PLAN OF CARE
Pt a&ox4, more alert tonight. Continuous bipap in place, pt tolerating fairly well. Tele in place- pt bradycardic during the night, hr in the 40s. Urine sample and blood cultures pending. Family updated on plan of care. Safety precautions in place. Call light within reach.  Problem: Diabetes/Glucose Control  Goal: Glucose maintained within prescribed range  Description: INTERVENTIONS:  - Monitor Blood Glucose as ordered  - Assess for signs and symptoms of hyperglycemia and hypoglycemia  - Administer ordered medications to maintain glucose within target range  - Assess barriers to adequate nutritional intake and initiate nutrition consult as needed  - Instruct patient on self management of diabetes  Outcome: Progressing     Problem: RESPIRATORY - ADULT  Goal: Achieves optimal ventilation and oxygenation  Description: INTERVENTIONS:  - Assess for changes in respiratory status  - Assess for changes in mentation and behavior  - Position to facilitate oxygenation and minimize respiratory effort  - Oxygen supplementation based on oxygen saturation or ABGs  - Provide Smoking Cessation handout, if applicable  - Encourage broncho-pulmonary hygiene including cough, deep breathe, Incentive Spirometry  - Assess the need for suctioning and perform as needed  - Assess and instruct to report SOB or any respiratory difficulty  - Respiratory Therapy support as indicated  - Manage/alleviate anxiety  - Monitor for signs/symptoms of CO2 retention  Outcome: Progressing     Problem: SAFETY ADULT - FALL  Goal: Free from fall injury  Description: INTERVENTIONS:  - Assess pt frequently for physical needs  - Identify cognitive and physical deficits and behaviors that affect risk of falls.  - [place] fall precautions as indicated by assessment.  - Educate pt/family on patient safety including physical limitations  - Instruct pt to call for assistance with activity based on assessment  - Modify environment to reduce risk of injury  - Provide  assistive devices as appropriate  - Consider OT/PT consult to assist with strengthening/mobility  - Encourage toileting schedule  Outcome: Progressing     Problem: GENITOURINARY - ADULT  Goal: Absence of urinary retention  Description: INTERVENTIONS:  - Assess patient’s ability to void and empty bladder  - Monitor intake/output and perform bladder scan as needed  - Follow urinary retention protocol/standard of care  - Consider collaborating with pharmacy to review patient's medication profile  - Implement strategies to promote bladder emptying  Outcome: Progressing     Problem: NEUROLOGICAL - ADULT  Goal: Achieves stable or improved neurological status  Description: INTERVENTIONS  - Assess for and report changes in neurological status  - Initiate measures to prevent increased intracranial pressure  - Maintain blood pressure and fluid volume within ordered parameters to optimize cerebral perfusion and minimize risk of hemorrhage  - Monitor temperature, glucose, and sodium. Initiate appropriate interventions as ordered  Outcome: Progressing  Goal: Absence of seizures  Description: INTERVENTIONS  - Monitor for seizure activity  - Administer anti-seizure medications as ordered  - Monitor neurological status  Outcome: Progressing     Problem: Patient/Family Goals  Goal: Patient/Family Long Term Goal  Description: Patient's Long Term Goal: respiratory relief    Interventions:  - continuous bipap  -frequent staff rounding  -respiratory meds  -monitor O2 sats  - See additional Care Plan goals for specific interventions  Outcome: Progressing     Problem: Patient/Family Goals  Goal: Patient/Family Short Term Goal  Description: Patient's Short Term Goal: discharge home    Interventions:   - Monitor vitals  - Monitor appropriate labs  - Administer medications as ordered  - Follow MD's orders  - Update patient on plan of care   - Discharge planning     - See additional Care Plan goals for specific interventions  Outcome:  Progressing

## 2024-04-03 NOTE — CONSULTS
Spiritual Care Visit Note    Patient Name: Ramón Bedoya Date of Spiritual Care Visit: 24   : 3/21/1950 Primary Dx: Dyspnea, unspecified type       Referred By: Referral From: Care Coordination    Spiritual Care Taxonomy:    Intended Effects: Aligning care plan with patient's values    Methods: Collaborate with care team member;Offer support    Interventions: Active listening;Ask guided questions;Assist someone with Advance Directives;Silent prayer    Visit Type/Summary:     - PoA: Patient unable to complete PoAH at this time: RN will call when family arrives to complete PoAH.  No need at this time.    Spiritual Care support can be requested via an Epic consult. For urgent/immediate needs, please contact the On Call  at: [place]: ext [phone]    ELFEGO Sood CAMII   [id number]

## 2024-04-04 LAB
ALBUMIN SERPL-MCNC: 3.6 G/DL (ref 3.2–4.8)
ALBUMIN SERPL-MCNC: 3.6 G/DL (ref 3.2–4.8)
ALP LIVER SERPL-CCNC: 81 U/L
ALT SERPL-CCNC: 41 U/L
ANION GAP SERPL CALC-SCNC: 12 MMOL/L (ref 0–18)
AST SERPL-CCNC: 39 U/L (ref ?–34)
BASOPHILS # BLD AUTO: 0.01 X10(3) UL (ref 0–0.2)
BASOPHILS NFR BLD AUTO: 0.1 %
BILIRUB DIRECT SERPL-MCNC: 0.2 MG/DL (ref ?–0.3)
BILIRUB SERPL-MCNC: 0.5 MG/DL (ref 0.2–1.1)
BLOOD TYPE BARCODE: 5100
BUN BLD-MCNC: 113 MG/DL (ref 9–23)
BUN/CREAT SERPL: 18.6 (ref 10–20)
CALCIUM BLD-MCNC: 7.6 MG/DL (ref 8.7–10.4)
CHLORIDE SERPL-SCNC: 106 MMOL/L (ref 98–112)
CO2 SERPL-SCNC: 17 MMOL/L (ref 21–32)
CREAT BLD-MCNC: 6.09 MG/DL
DEPRECATED RDW RBC AUTO: 45.1 FL (ref 35.1–46.3)
EGFRCR SERPLBLD CKD-EPI 2021: 9 ML/MIN/1.73M2 (ref 60–?)
EOSINOPHIL # BLD AUTO: 0 X10(3) UL (ref 0–0.7)
EOSINOPHIL NFR BLD AUTO: 0 %
ERYTHROCYTE [DISTWIDTH] IN BLOOD BY AUTOMATED COUNT: 14 % (ref 11–15)
GLUCOSE BLD-MCNC: 215 MG/DL (ref 70–99)
GLUCOSE BLDC GLUCOMTR-MCNC: 235 MG/DL (ref 70–99)
GLUCOSE BLDC GLUCOMTR-MCNC: 295 MG/DL (ref 70–99)
GLUCOSE BLDC GLUCOMTR-MCNC: 354 MG/DL (ref 70–99)
GLUCOSE BLDC GLUCOMTR-MCNC: 393 MG/DL (ref 70–99)
GLUCOSE BLDC GLUCOMTR-MCNC: 420 MG/DL (ref 70–99)
HCT VFR BLD AUTO: 24.6 %
HGB BLD-MCNC: 8.3 G/DL
IMM GRANULOCYTES # BLD AUTO: 0.07 X10(3) UL (ref 0–1)
IMM GRANULOCYTES NFR BLD: 0.8 %
LYMPHOCYTES # BLD AUTO: 0.52 X10(3) UL (ref 1–4)
LYMPHOCYTES NFR BLD AUTO: 5.7 %
MCH RBC QN AUTO: 30 PG (ref 26–34)
MCHC RBC AUTO-ENTMCNC: 33.7 G/DL (ref 31–37)
MCV RBC AUTO: 88.8 FL
MONOCYTES # BLD AUTO: 0.3 X10(3) UL (ref 0.1–1)
MONOCYTES NFR BLD AUTO: 3.3 %
NEUTROPHILS # BLD AUTO: 8.27 X10 (3) UL (ref 1.5–7.7)
NEUTROPHILS # BLD AUTO: 8.27 X10(3) UL (ref 1.5–7.7)
NEUTROPHILS NFR BLD AUTO: 90.1 %
OSMOLALITY SERPL CALC.SUM OF ELEC: 322 MOSM/KG (ref 275–295)
PHOSPHATE SERPL-MCNC: 7.3 MG/DL (ref 2.4–5.1)
PLATELET # BLD AUTO: 197 10(3)UL (ref 150–450)
POTASSIUM SERPL-SCNC: 4.9 MMOL/L (ref 3.5–5.1)
PROT SERPL-MCNC: 6.5 G/DL (ref 5.7–8.2)
RBC # BLD AUTO: 2.77 X10(6)UL
SODIUM SERPL-SCNC: 135 MMOL/L (ref 136–145)
TROPONIN I SERPL HS-MCNC: 77 NG/L
UNIT VOLUME: 350 ML
WBC # BLD AUTO: 9.2 X10(3) UL (ref 4–11)

## 2024-04-04 PROCEDURE — 99232 SBSQ HOSP IP/OBS MODERATE 35: CPT | Performed by: INTERNAL MEDICINE

## 2024-04-04 PROCEDURE — 99233 SBSQ HOSP IP/OBS HIGH 50: CPT | Performed by: HOSPITALIST

## 2024-04-04 RX ORDER — INSULIN DEGLUDEC 100 U/ML
15 INJECTION, SOLUTION SUBCUTANEOUS NIGHTLY
Status: DISCONTINUED | OUTPATIENT
Start: 2024-04-04 | End: 2024-04-04

## 2024-04-04 RX ORDER — INSULIN DEGLUDEC 100 U/ML
20 INJECTION, SOLUTION SUBCUTANEOUS NIGHTLY
Status: DISCONTINUED | OUTPATIENT
Start: 2024-04-05 | End: 2024-04-04

## 2024-04-04 RX ORDER — INSULIN DEGLUDEC 100 U/ML
20 INJECTION, SOLUTION SUBCUTANEOUS NIGHTLY
Status: DISCONTINUED | OUTPATIENT
Start: 2024-04-04 | End: 2024-04-05

## 2024-04-04 RX ORDER — METHYLPREDNISOLONE SODIUM SUCCINATE 40 MG/ML
40 INJECTION, POWDER, LYOPHILIZED, FOR SOLUTION INTRAMUSCULAR; INTRAVENOUS EVERY 12 HOURS
Status: DISCONTINUED | OUTPATIENT
Start: 2024-04-04 | End: 2024-04-04

## 2024-04-04 RX ORDER — PREDNISONE 20 MG/1
40 TABLET ORAL
Status: DISCONTINUED | OUTPATIENT
Start: 2024-04-05 | End: 2024-04-06

## 2024-04-04 NOTE — OCCUPATIONAL THERAPY NOTE
OCCUPATIONAL THERAPY EVALUATION - INPATIENT     Room Number: 507/507-A  Evaluation Date: 4/4/2024  Type of Evaluation: Initial  Presenting Problem: acute hypoxemic respiratory failure, near end stage renal disease, LG on CKD3, multifocal PNA, influenza, c diff  Hx DM2 with neuropathy, HTN, TBI 6/2023     Physician Order: IP Consult to Occupational Therapy  Reason for Therapy: ADL/IADL Dysfunction and Discharge Planning    OCCUPATIONAL THERAPY ASSESSMENT   Patient is a 74 year old male admitted 4/2/2024 for acute hypoxemic respiratory failure, near end stage renal disease, LG on CKD3, multifocal PNA, influenza, c diff.  Prior to admission, patient's baseline is indep with ADLs and fx mobility without a device.  Patient is currently functioning near baseline with ADLs and fx mobility/transfers.  Patient is requiring up to min assist as a result of the following impairments: decreased functional strength, decreased functional reach, decreased endurance, impaired balance, decreased muscular endurance, and increased O2 needs from baseline. Occupational Therapy will continue to follow for duration of hospitalization.    Patient will benefit from continued skilled OT Services at discharge to promote functional independence and safety with additional support and return home with home health OT    PLAN  OT Treatment Plan: Energy conservation/work simplification techniques;Balance activities;ADL training;Functional transfer training;Endurance training;Patient/Family education;Patient/Family training;Equipment eval/education;Compensatory technique education     OCCUPATIONAL THERAPY MEDICAL/SOCIAL HISTORY   Problem List  Principal Problem:    Dyspnea, unspecified type  Active Problems:    Hyponatremia    Anemia    Acute renal failure (ARF) (HCC)    Acute kidney injury (HCC)    Metabolic acidosis    Respiratory alkalosis    Hyperglycemia    Influenzal bronchitis    HOME SITUATION  Type of Home: House  Home Layout: Two  level  Lives With: Spouse  Shower/Tub and Equipment: Tub-shower combo  Occupation/Status: Retired   Drives: No  Patient Regularly Uses: None  Stairs in Home: 15 steps upstairs with railing  Use of Assistive Device(s): None    SUBJECTIVE  \"I don't use anything.\" (Walker/cane)    OCCUPATIONAL THERAPY EXAMINATION    OBJECTIVE  Precautions: Other (Comment) (droplet)  Fall Risk: Standard fall risk    PAIN ASSESSMENT  Ratin    ACTIVITY TOLERANCE  Pulse: 94 (79 bpm with ax)  Heart Rate Source: Monitor                   O2 SATURATIONS  Oxygen Therapy  SPO2% on Oxygen at Rest: 96  At rest oxygen flow (liters per minute): 5  SPO2% Ambulation on Oxygen: 87  Ambulation oxygen flow (liters per minute): 5    COGNITION  Overall Cognitive Status:  WFL - within functional limits    SENSATION  Light touch:  intact    RANGE OF MOTION   Upper extremity ROM is within functional limits     STRENGTH ASSESSMENT  Upper extremity strength is within functional limits     COORDINATION  Gross Motor: WFL   Fine Motor: WFL     ACTIVITIES OF DAILY LIVING ASSESSMENT  AM-PAC ‘6-Clicks’ Inpatient Daily Activity Short Form  How much help from another person does the patient currently need…  -   Putting on and taking off regular lower body clothing?: A Little  -   Bathing (including washing, rinsing, drying)?: A Little  -   Toileting, which includes using toilet, bedpan or urinal? : A Little  -   Putting on and taking off regular upper body clothing?: None  -   Taking care of personal grooming such as brushing teeth?: A Little  -   Eating meals?: None    AM-PAC Score:  Score: 20  Approx Degree of Impairment: 38.32%  Standardized Score (AM-PAC Scale): 42.03  CMS Modifier (G-Code): CJ    BED MOBILITY  Supine to Sit: Indep    FUNCTIONAL TRANSFER ASSESSMENT  Sit to Stand from EOB: SBA  Chair Transfer: SBA    FUNCTIONAL MOBILITY  SBA for in-room mobility using RW. CGA for mobility without a device. 1x LOB during turn without a device but able  to self-correct. RPE = 8/10. See vitals above. Provided education on energy conservation strategies and activity pacing using pulse oximeter.    FUNCTIONAL ADL ASSESSMENT  Eating: independent seated (per obs)   Grooming: stand-by assist standing at sink  UB Dressing: independent seated (per obs)   LB Dressing: min assist  Toileting: supervision seated (per obs)     EDUCATION PROVIDED  Patient: Role of Occupational Therapy; Plan of Care; Discharge Recommendations; Functional Transfer Techniques; Fall Prevention; Posture/Positioning; Energy Conservation; Proper Body Mechanics; Compensatory ADL Techniques  Patient's Response to Education: Verbalized Understanding; Requires Further Education; Returned Demonstration    The patient's Approx Degree of Impairment: 38.32% has been calculated based on documentation in the [facility] '6 clicks' Inpatient Daily Activity Short Form.  Research supports that patients with this level of impairment may benefit from  OT.  Final disposition will be made by interdisciplinary medical team.     Patient End of Session: Up in chair;Needs met;Call light within reach;RN aware of session/findings;All patient questions and concerns addressed;Alarm set    OT Goals  Patients self stated goal is: none stated     Patient will complete functional transfer with SUP  Comment:     Patient will complete LB dressing with SUP  Comment:     Patient will complete grooming in standing at sink with SUP  Comment:    Patient will complete item retrieval with SUP  Comment:          Goals  on: 24  Frequency: 3-5x/week    Patient Evaluation Complexity Level:   Occupational Profile/Medical History LOW - Brief history including review of medical or therapy records    Specific performance deficits impacting engagement in ADL/IADL MODERATE  3 - 5 performance deficits   Client Assessment/Performance Deficits MODERATE - Comorbidities and min to mod modifications of tasks    Clinical Decision Making LOW -  Analysis of occupational profile, problem-focused assessments, limited treatment options    Overall Complexity LOW     OT Session Time  Therapeutic Activity: 12 minutes    REGGIE Zavala/L  [facility]  #[id number]

## 2024-04-04 NOTE — PROGRESS NOTES
[facility]  part of Merged with [facility]    Progress Note    Ramón Bedoya Patient Status:  Inpatient    3/21/1950 MRN [id number]   Location [facility]5W Attending Bria Montanez MD   Hosp Day # 2 PCP LEONEL CORTES     Chief Complaint:   Chief Complaint   Patient presents with    Body ache and/or chills    Shortness Of Breath   Wheezing     Subjective:   Ramón Bedoya is feeling better. SOB improved. Occasional cough. No F/C. Wife at bedside. Dtr Yadi on phone. No events overnight down to 3L NC o2       Objective:   Objective:    Blood pressure 130/56, pulse 61, temperature 97.3 °F (36.3 °C), temperature source Oral, resp. rate 18, weight 190 lb 6.4 oz (86.4 kg), SpO2 94%.    Physical Exam:    General: No acute distress.   Respiratory: Clear to auscultation bilaterally. No wheezes. No rhonchi.  Cardiovascular: S1, S2. Regular rate and rhythm. No murmurs, rubs or gallops.   Abdomen: Soft, nontender, nondistended.  Positive bowel sounds. No rebound or guarding.  Neurologic: No focal neurological deficits.   Musculoskeletal: Moves all extremities.  Extremities: No edema.      Results:   Results:    Labs:  Recent Labs   Lab 24  1116 24  1122 24  0815 24  1523 24  0543   WBC 11.0  --  6.0  --  9.2   HGB 8.9*  --  6.8* 8.6* 8.3*   MCV 91.7  --  91.3  --  88.8   .0  --  171.0  --  197.0   INR  --  0.95  --   --   --        Recent Labs   Lab 24  1122 24  1313 24  0815 24  0543   *  --  126* 215*   BUN 79*  --  96* 113*   CREATSERUM 5.35*  --  5.93* 6.09*   CA 8.5*  --  7.6* 7.6*   ALB 4.2 3.4 3.6 3.6  3.6   *  --  138 135*   K 4.6  --  5.1 4.9     --  108 106   CO2 18.0*  --  18.0* 17.0*   ALKPHO 100 77  --  81   AST 65* 55*  --  39*   ALT 57* 50*  --  41   BILT 0.7 0.5  --  0.5   TP 7.5 6.3  --  6.5       Estimated Creatinine Clearance: 8.4 mL/min (A) (based on SCr of 6.09 mg/dL (H)).    Recent Labs   Lab 24  1122    PTP 13.2   INR 0.95            Culture:  Hospital Encounter on 04/02/24   1. Blood Culture     Status: None (Preliminary result)    Collection Time: 04/02/24 11:41 AM    Specimen: Blood,peripheral   Result Value Ref Range    Blood Culture Result No Growth 2 Days N/A       Cardiac  No results for input(s): \"TROP\", \"PBNP\" in the last 168 hours.      Imaging: Imaging data reviewed in Epic.  No results found.    Medications:    methylPREDNISolone  40 mg Intravenous Q12H    insulin aspart  1-11 Units Subcutaneous TID CC and HS    heparin  5,000 Units Subcutaneous 2 times per day    azithromycin  500 mg Intravenous Q24H    cefTRIAXone  1 g Intravenous Q24H    vancomycin  125 mg Oral Daily    amiodarone  200 mg Oral Daily    amLODIPine  10 mg Oral Daily    atorvastatin  10 mg Oral Daily    calcitriol  0.25 mcg Oral Q MWF    furosemide  40 mg Oral Daily    hydrALAZINE  25 mg Oral TID    insulin degludec  6 Units Subcutaneous Nightly    sodium bicarbonate  650 mg Oral BID    levETIRAcetam  500 mg Intravenous Q12H         Assessment and Plan:   Assessment & Plan:        Multifocal pneumonia   Influenza A infection  Acute hypoxemic respiratory failure   - Influenza A positive on arrival, hold Tamiflu since out of window.   - CXR has bilateral opacities  - IV steroids solumedrol 40mg BID   - ceftriaxone (4/2- ), azithromycin (4/2- )  - wean o2 was on bipap on arrival.   - Pulm on consult.   - blood cx x 2 NGTD.   - rpt CXR.   - Sputum culture   - IS/Flutter valve.      Acute kidney injury on CKD 4  Metabolic acidosis  Hyponatremia  Hypertensive nephropathy  Diabetic nephropathy  - baseline Cr around 3-3.5,  - BUN/Creat same today 6.09 creat. Still making urine   - rpt labs in AM   - stop IVF.   - Renal following  - He declined dialysis even if needed.   - sodium bicarb PO      Acute on chronic Anemia  Anemia of chronic kidney disease  Iron def anemia   - hgb baseline 8-9 per EMR.   - hgb 8.9 on arrival  - was down to 6.8   -  s/p 1unit PRBC   - iron low --> Venofer.      Elevated troponin  - trending down   - likely supple demand ischemia from hypoxia infection      Bradycardia  - HR around 50, lowest 45  - no BB, CCB or clonidine, continue to monitor on Tele     DM2, worse from steroids.   - A1c 6.6  - on degludec 15 U at bedtime, SSI AC/HS     HTN  HLD  - continue home meds, except losartan    Other medical problems  H/o SDH  H/o A.fib     >55min spent, >50% spent counseling and coordinating care in the form of educating pt/family and d/w consultants and staff. Most of the time spent discussing the above plan.      Dispo: possible home weekend   Plan of care discussed with patient or family at bedside.    Bria Montanez MD  Hospitalist          Supplementary Documentation:     Quality:  DVT Prophylaxis: heparin   CODE status: DNR  Dispo: per clinical course           Estimated date of discharge: TBD  Discharge is dependent on: clinical stability  At this point Mr. Bedoya is expected to be discharge to: TBD        **Certification      PHYSICIAN Certification of Need for Inpatient Hospitalization - Initial Certification    Patient will require inpatient services that will reasonably be expected to span two midnight's based on the clinical documentation in H+P.   Based on patients current state of illness, I anticipate that, after discharge, patient will require TBD.

## 2024-04-04 NOTE — PHYSICAL THERAPY NOTE
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  Prior to admission, patient's baseline is independent without an assistive device.  Patient is currently functioning near baseline with bed mobility, transfers, and gait.  Patient is requiring contact guard assist as a result of the following impairments: decreased endurance/aerobic capacity, medical status, and increased O2 needs from baseline.  Physical Therapy will continue to follow for duration of hospitalization.    Patient will benefit from continued skilled PT Services at discharge to promote functional independence in home.  Anticipate patient will return home with home health PT vs no needs pending progress.    PLAN  PT Treatment Plan: Gait training;Endurance;Stair training;Balance training  Rehab Potential : Good  Frequency (Obs): 5x/week    PHYSICAL THERAPY MEDICAL/SOCIAL HISTORY     History related to present admission: Admitted S/P craniotomy 8/2023, minAx2/modAx1, recommended acute inpatient rehabilitation, D/C'ed to [facility]    Problem List  Principal Problem:    Dyspnea, unspecified type  Active Problems:    Hyponatremia    Anemia    Acute renal failure (ARF) (HCC)    Acute kidney injury (HCC)    Metabolic acidosis    Respiratory alkalosis    Hyperglycemia    Influenzal bronchitis      HOME SITUATION  Home Situation  Type of Home: House  Home Layout: Two level  Stairs to Enter : 0  Stairs to Bedroom: 15  Railing: Yes  Lives With: Spouse  Drives: Yes  Patient Owned Equipment:  None  Patient Regularly Uses: None     Prior Level of [place]: independent no assistive device     SUBJECTIVE  \"I don't use anything.\"    PHYSICAL THERAPY EXAMINATION   OBJECTIVE  Precautions: Other (Comment) (droplet)  Fall Risk: Standard fall risk    WEIGHT BEARING RESTRICTION  Weight Bearing Restriction: None                PAIN ASSESSMENT  Ratin  Location: denies       COGNITION  Overall Cognitive Status:  WFL - within functional limits    RANGE OF MOTION AND STRENGTH ASSESSMENT  Upper extremity ROM and strength are within functional limits  BUEs  Lower extremity ROM is within functional limits  BLEs  Lower extremity strength is within functional limits  BLEs    BALANCE  Static Sitting: Normal  Dynamic Sitting: Good  Static Standing: Good  Dynamic Standing: Fair +    NEUROLOGICAL FINDINGS           Sensation: WNL          ACTIVITY TOLERANCE  Pulse: 64 (at rest, 79 with activity)  Heart Rate Source: Monitor                   O2 WALK  Oxygen Therapy  SPO2% on Oxygen at Rest: 96  At rest oxygen flow (liters per minute): 5  SPO2% Ambulation on Oxygen: 87  Ambulation oxygen flow (liters per minute): 5    AM-PAC '6-Clicks' INPATIENT SHORT FORM - BASIC MOBILITY  How much difficulty does the patient currently have...  Patient Difficulty: Turning over in bed (including adjusting bedclothes, sheets and blankets)?: None   Patient Difficulty: Sitting down on and standing up from a chair with arms (e.g., wheelchair, bedside commode, etc.): A Little   Patient Difficulty: Moving from lying on back to sitting on the side of the bed?: A Little   How much help from another person does the patient currently need...   Help from Another: Moving to and from a bed to a chair (including a wheelchair)?: A Little   Help from Another: Need to walk in hospital room?: A Little   Help from Another: Climbing 3-5 steps with a railing?: A Little     AM-PAC Score:  Raw Score: 19   Approx Degree of Impairment: 41.77%   Standardized Score  (AM-PAC Scale): 45.44   CMS Modifier (G-Code): CK    FUNCTIONAL ABILITY STATUS  Functional Mobility/Gait Assessment  Gait Assistance: Contact guard assist  Distance (ft): 30' with RW, 35' no AD  Assistive Device: Rolling walker;None  Pattern: Comment (step-through, reciprocal pattern, slow-moderate pace, picking up walker while turning so trial'ed without walker, x1 lateral LOB with pt able to self-correct with cross-over stepping strategy, CGA required)  Rolling: supervision  Supine to Sit: supervision  Sit to Stand: stand-by assist    ADDITIONAL INFORMATION    Pt tolerating household distances without assistive device, increased O2 needs from baseline.     Patient received supine in bed, agreeable to physical therapy evaluation. Vital signs monitored as noted above, patient experiencing desat on 5L to 87% with activity, recovering <20s with seated rest and cues for breathing technique . Education with patient provided verbally on physical therapy plan of care, physiological benefits of out of bed mobility, energy conservation/activity pacing, and breathing technique. Next session anticipate to progress gait and stair negotiation.    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 Based on the physical therapy examination of the noted systems and functional activity/participation limitations, the patient presentation is evolving given the patient demonstrates worsening of previously stable condition, demonstrates worsening of co-morbidities, and presents with unstable vital signs.      Exercise/Education Provided:  Bed mobility  Body mechanics  Energy conservation  Functional activity tolerated  Gait training  Posture  Transfer training    The patient's Approx Degree  of Impairment: 41.77% has been calculated based on documentation in the [facility] '6 clicks' Inpatient Basic Mobility Short Form.  Research supports that patients with this level of impairment may benefit from home-health PT, however pending further progress may not need any therapy at discharge.  Final disposition will be made by interdisciplinary medical team.    Patient End of Session: Up in chair;Needs met;Call light within reach;All patient questions and concerns addressed;Alarm set    CURRENT GOALS  Goals to be met by: 24  Patient Goal Patient's self-stated goal is: return home safely   Goal #1 Patient is able to demonstrate supine - sit EOB @ level: independent     Goal #1   Current Status    Goal #2 Patient is able to demonstrate transfers EOB to/from [facility] at assistance level: independent with none     Goal #2  Current Status    Goal #3 Patient is able to ambulate 150 feet with assist device: none at assistance level: independent   Goal #3   Current Status    Goal #4 Patient will negotiate 15 stairs/one curb w/ assistive device and supervision   Goal #4   Current Status    Goal #5 Patient to demonstrate independence with home activity/exercise instructions provided to patient in preparation for discharge.   Goal #5   Current Status    Goal #6    Goal #6  Current Status      Patient Evaluation Complexity Level:  History Moderate - 1 or 2 personal factors and/or co-morbidities   Examination of body systems Moderate - addressing a total of 3 or more elements   Clinical Presentation  Moderate - Evolving   Clinical Decision Making  Moderate Complexity     Gait Trainin minutes      Kiara Jha, PT, DPT  [facility]  [id number]

## 2024-04-04 NOTE — PLAN OF CARE
A&O x 3. Currently on 3 liters via nasal cannula, saturating low 90's. (90-91%). IV iron administered, no reaction noted. No signs of bleeding noted. On tele, no alarms. Pt up with contact guard, ambulating within the room. Safety precautions observed, call light and personal needs within reach.     Daughter, Yadi did not come to hospital tomorrow, plan for her to come tomorrow to complete POA paperwork for patient.        Problem: Diabetes/Glucose Control  Goal: Glucose maintained within prescribed range  Description: INTERVENTIONS:  - Monitor Blood Glucose as ordered  - Assess for signs and symptoms of hyperglycemia and hypoglycemia  - Administer ordered medications to maintain glucose within target range  - Assess barriers to adequate nutritional intake and initiate nutrition consult as needed  - Instruct patient on self management of diabetes  Outcome: Progressing     Problem: RESPIRATORY - ADULT  Goal: Achieves optimal ventilation and oxygenation  Description: INTERVENTIONS:  - Assess for changes in respiratory status  - Assess for changes in mentation and behavior  - Position to facilitate oxygenation and minimize respiratory effort  - Oxygen supplementation based on oxygen saturation or ABGs  - Provide Smoking Cessation handout, if applicable  - Encourage broncho-pulmonary hygiene including cough, deep breathe, Incentive Spirometry  - Assess the need for suctioning and perform as needed  - Assess and instruct to report SOB or any respiratory difficulty  - Respiratory Therapy support as indicated  - Manage/alleviate anxiety  - Monitor for signs/symptoms of CO2 retention  Outcome: Progressing     Problem: SAFETY ADULT - FALL  Goal: Free from fall injury  Description: INTERVENTIONS:  - Assess pt frequently for physical needs  - Identify cognitive and physical deficits and behaviors that affect risk of falls.  - [place] fall precautions as indicated by assessment.  - Educate pt/family on patient safety  including physical limitations  - Instruct pt to call for assistance with activity based on assessment  - Modify environment to reduce risk of injury  - Provide assistive devices as appropriate  - Consider OT/PT consult to assist with strengthening/mobility  - Encourage toileting schedule  Outcome: Progressing     Problem: NEUROLOGICAL - ADULT  Goal: Achieves stable or improved neurological status  Description: INTERVENTIONS  - Assess for and report changes in neurological status  - Initiate measures to prevent increased intracranial pressure  - Maintain blood pressure and fluid volume within ordered parameters to optimize cerebral perfusion and minimize risk of hemorrhage  - Monitor temperature, glucose, and sodium. Initiate appropriate interventions as ordered  Outcome: Progressing  Goal: Absence of seizures  Description: INTERVENTIONS  - Monitor for seizure activity  - Administer anti-seizure medications as ordered  - Monitor neurological status  Outcome: Progressing     Problem: Patient/Family Goals  Goal: Patient/Family Long Term Goal  Description: Patient's Long Term Goal: respiratory relief    Interventions:  - continuous bipap  -frequent staff rounding  -respiratory meds  -monitor O2 sats  - See additional Care Plan goals for specific interventions  Outcome: Progressing  Goal: Patient/Family Short Term Goal  Description: Patient's Short Term Goal: discharge home    Interventions:   - Monitor vitals  - Monitor appropriate labs  - Administer medications as ordered  - Follow MD's orders  - Update patient on plan of care   - Discharge planning     - See additional Care Plan goals for specific interventions  Outcome: Progressing

## 2024-04-04 NOTE — PLAN OF CARE
Pt a&ox4. Tele with continuous pulse ox in place- pt on 8L O2 NC. Pt's family at bedside updated on plan of care. POA paperwork to be filled out another day per daughter when available. Blood sugar levels monitored and Safety precautions in place.   Problem: Diabetes/Glucose Control  Goal: Glucose maintained within prescribed range  Description: INTERVENTIONS:  - Monitor Blood Glucose as ordered  - Assess for signs and symptoms of hyperglycemia and hypoglycemia  - Administer ordered medications to maintain glucose within target range  - Assess barriers to adequate nutritional intake and initiate nutrition consult as needed  - Instruct patient on self management of diabetes  Outcome: Progressing     Problem: RESPIRATORY - ADULT  Goal: Achieves optimal ventilation and oxygenation  Description: INTERVENTIONS:  - Assess for changes in respiratory status  - Assess for changes in mentation and behavior  - Position to facilitate oxygenation and minimize respiratory effort  - Oxygen supplementation based on oxygen saturation or ABGs  - Provide Smoking Cessation handout, if applicable  - Encourage broncho-pulmonary hygiene including cough, deep breathe, Incentive Spirometry  - Assess the need for suctioning and perform as needed  - Assess and instruct to report SOB or any respiratory difficulty  - Respiratory Therapy support as indicated  - Manage/alleviate anxiety  - Monitor for signs/symptoms of CO2 retention  Outcome: Progressing     Problem: SAFETY ADULT - FALL  Goal: Free from fall injury  Description: INTERVENTIONS:  - Assess pt frequently for physical needs  - Identify cognitive and physical deficits and behaviors that affect risk of falls.  - [place] fall precautions as indicated by assessment.  - Educate pt/family on patient safety including physical limitations  - Instruct pt to call for assistance with activity based on assessment  - Modify environment to reduce risk of injury  - Provide assistive devices as  appropriate  - Consider OT/PT consult to assist with strengthening/mobility  - Encourage toileting schedule  Outcome: Progressing     Problem: GENITOURINARY - ADULT  Goal: Absence of urinary retention  Description: INTERVENTIONS:  - Assess patient’s ability to void and empty bladder  - Monitor intake/output and perform bladder scan as needed  - Follow urinary retention protocol/standard of care  - Consider collaborating with pharmacy to review patient's medication profile  - Implement strategies to promote bladder emptying  Outcome: Progressing     Problem: NEUROLOGICAL - ADULT  Goal: Achieves stable or improved neurological status  Description: INTERVENTIONS  - Assess for and report changes in neurological status  - Initiate measures to prevent increased intracranial pressure  - Maintain blood pressure and fluid volume within ordered parameters to optimize cerebral perfusion and minimize risk of hemorrhage  - Monitor temperature, glucose, and sodium. Initiate appropriate interventions as ordered  Outcome: Progressing  Goal: Absence of seizures  Description: INTERVENTIONS  - Monitor for seizure activity  - Administer anti-seizure medications as ordered  - Monitor neurological status  Outcome: Progressing     Problem: Patient/Family Goals  Goal: Patient/Family Long Term Goal  Description: Patient's Long Term Goal: respiratory relief    Interventions:  - continuous bipap  -frequent staff rounding  -respiratory meds  -monitor O2 sats  - See additional Care Plan goals for specific interventions  Outcome: Progressing  Goal: Patient/Family Short Term Goal  Description: Patient's Short Term Goal: discharge home    Interventions:   - Monitor vitals  - Monitor appropriate labs  - Administer medications as ordered  - Follow MD's orders  - Update patient on plan of care   - Discharge planning     - See additional Care Plan goals for specific interventions  Outcome: Progressing
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
## 2024-04-04 NOTE — PROGRESS NOTES
[facility]  part of [facility]    Progress Note    Ramón Bedoya Patient Status:  Inpatient    3/21/1950 MRN [id number]   Location [facility]5W Attending Bria Montanez MD   Hosp Day # 2 PCP LEONEL CORTES       Subjective:   Ramón Bedoya is a(n) 74 year old male who I am seeing for LG      He is feeling well    Objective:   /64 (BP Location: Left arm)   Pulse 66   Temp 97.7 °F (36.5 °C) (Oral)   Resp 18   Wt 190 lb 6.4 oz (86.4 kg)   SpO2 92%   BMI 33.73 kg/m²      Intake/Output Summary (Last 24 hours) at 2024 1107  Last data filed at 2024 1019  Gross per 24 hour   Intake 1628 ml   Output 1100 ml   Net 528 ml     Wt Readings from Last 1 Encounters:   24 190 lb 6.4 oz (86.4 kg)       Exam  Gen: No acute distress, Heent: NC AT, mucous memb clear, neck supple  Pulm: Lungs coarse, normal respiratory effort  CV: Heart with regular rate and rhythm, no edema  Abd: Abdomen soft, nontender, nondistended, no organomegaly, bowel sounds present  Skin: no symptoms reported  Psych: alert and oriented    Assessment and Plan:     1 -LG  GFR is running much worse than his baseline.  I explained this to his daughters at the bedside on admission and to the patient today     This could be due to his acute septic illness/respiratory failure.  He also appears to be slightly prerenal.     Will monitor his renal function.  However I will respect his wishes of not wanting to pursue dialysis     His urine output is good and his creatinine level seems to have plateaued     2 -respiratory failure/influenza  Supportive care.       3 -hypertension with CKD  Blood pressure is running low.  Would advise to hold his losartan     4 - DM 2 w nephropathy  Accu-Cheks     5 -anemia  Status post PRBC today               Results:     Recent Labs   Lab 24  1116 24  0815 24  1523 24  0543   RBC 2.88* 2.30*  --  2.77*   HGB 8.9* 6.8* 8.6* 8.3*   HCT 26.4* 21.0*  --  24.6*   MCV  91.7 91.3  --  88.8   MCH 30.9 29.6  --  30.0   MCHC 33.7 32.4  --  33.7   RDW 14.0 14.1  --  14.0   NEPRELIM 9.72* 5.38  --  8.27*   WBC 11.0 6.0  --  9.2   .0 171.0  --  197.0         Recent Labs   Lab 04/02/24  1122 04/03/24  0815 04/04/24  0543   * 126* 215*   BUN 79* 96* 113*   CREATSERUM 5.35* 5.93* 6.09*   CA 8.5* 7.6* 7.6*   * 138 135*   K 4.6 5.1 4.9    108 106   CO2 18.0* 18.0* 17.0*          XR CHEST AP PORTABLE  (CPT=71045)    Result Date: 4/2/2024  CONCLUSION: Multifocal pneumonia.    Dictated by (CST): Nikos Putnam MD on 4/02/2024 at 1:14 PM     Finalized by (CST): Nikos Putnam MD on 4/02/2024 at 1:15 PM               ABHINAV GOMES MD  4/4/2024

## 2024-04-04 NOTE — PROGRESS NOTES
[facility]  part of [facility]     Progress Note    Ramón Bdeoya Patient Status:  Inpatient    3/21/1950 MRN [id number]   Location [facility]5W Attending Aníbal Najera MD   Hosp Day # 2 PCP LEONEL CORTES       Subjective:   Patient seen and examined.  Resting in bed.  Overall improvement dyspnea.  Some ongoing cough present    Objective:   Blood pressure 141/64, pulse 66, temperature 97.7 °F (36.5 °C), temperature source Oral, resp. rate 18, weight 190 lb 6.4 oz (86.4 kg), SpO2 92%.  Intake/Output:   Last 3 shifts: I/O last 3 completed shifts:  In: 1391 [P.O.:366; I.V.:675; Blood:350]  Out: 1450 [Urine:1450]   This shift: No intake/output data recorded.     Vent Settings:      Hemodynamic parameters (last 24 hours):      Scheduled Meds:         Continuous Infusions:         Physical Exam  Constitutional: no acute distress  Eyes: PERRL  ENT: nares pateint  Neck: supple, no JVD  Cardio: RRR, S1 S2  Respiratory: Scattered Rales  GI: abdomen soft, non tender, active bowel sounds, no organomegaly  Extremities: no clubbing, cyanosis, edema  Neurologic: no gross motor deficits  Skin: warm, dry      Results:     Lab Results   Component Value Date    WBC 9.2 2024    HGB 8.3 2024    HCT 24.6 2024    .0 2024    CREATSERUM 6.09 2024     2024     2024    K 4.9 2024     2024    CO2 17.0 2024     2024    CA 7.6 2024    ALB 3.6 2024    ALB 3.6 2024    ALKPHO 81 2024    BILT 0.5 2024    TP 6.5 2024    AST 39 2024    ALT 41 2024    PHOS 7.3 2024       XR CHEST AP PORTABLE  (CPT=71045)    Result Date: 2024  CONCLUSION: Multifocal pneumonia.    Dictated by (CST): Nikos Putnam MD on 2024 at 1:14 PM     Finalized by (CST): Nikos Putnam MD on 2024 at 1:15 PM           EKG 12 Lead    Result Date: 2024  Possible Atrial fibrillation Prolonged  QT interval or tu fusion, consider myocardial disease, electrolyte imbalance, or drug effects Marked baseline artifact Abnormal ECG When compared with ECG of 19-AUG-2023 12:04, QRS duration has increased Nonspecific T wave abnormality no longer evident in Inferior leads Confirmed by JAVIER NOVA ([id number]) on 4/2/2024 2:44:21 PM     Assessment   1.  Acute hypoxemic respiratory failure  2.  Influenza A  3.  Multifocal pneumonia  4.  Acute kidney injury on chronic kidney disease  5.  Diabetes mellitus  6.  Anemia       Plan     -Patient presents with worsening dyspnea cough cold like symptoms for 3 to 4 days presentation prior to arrival.  Significant hypoxemic respiratory failure on presentation  -Viral screen positive for influenza A  -Chest x-ray with evidence of multifocal infiltrates concerning for pneumonia.  Repeat chest x-ray ordered for tomorrow a.m.  -IV steroids and gradually wean  -Empiric antibiotic therapy at this time  -Blood cultures pending  -Weaned off BiPAP. Currently on 5 L.  Wean as tolerated.  -Nebulizer treatments  -Further recommendations per nephrology  -Increase activity as tolerated  -Patient made DNR/DNI.    Kay Mane DO  Pulmonary Critical Care Medicine  [facility]

## 2024-04-05 ENCOUNTER — APPOINTMENT (OUTPATIENT)
Dept: GENERAL RADIOLOGY | Facility: HOSPITAL | Age: 74
End: 2024-04-05
Attending: INTERNAL MEDICINE
Payer: MEDICARE

## 2024-04-05 PROBLEM — Z51.5 PALLIATIVE CARE BY SPECIALIST: Status: ACTIVE | Noted: 2024-04-05

## 2024-04-05 LAB
ALBUMIN SERPL-MCNC: 3.4 G/DL (ref 3.2–4.8)
ANION GAP SERPL CALC-SCNC: 13 MMOL/L (ref 0–18)
BILIRUB UR QL: NEGATIVE
BUN BLD-MCNC: 127 MG/DL (ref 9–23)
BUN/CREAT SERPL: 19.8 (ref 10–20)
CALCIUM BLD-MCNC: 7.5 MG/DL (ref 8.7–10.4)
CHLORIDE SERPL-SCNC: 107 MMOL/L (ref 98–112)
CLARITY UR: CLEAR
CO2 SERPL-SCNC: 16 MMOL/L (ref 21–32)
CREAT BLD-MCNC: 6.43 MG/DL
CREAT UR-SCNC: 103.3 MG/DL
DEPRECATED RDW RBC AUTO: 45.1 FL (ref 35.1–46.3)
EGFRCR SERPLBLD CKD-EPI 2021: 8 ML/MIN/1.73M2 (ref 60–?)
ERYTHROCYTE [DISTWIDTH] IN BLOOD BY AUTOMATED COUNT: 14.2 % (ref 11–15)
GLUCOSE BLD-MCNC: 230 MG/DL (ref 70–99)
GLUCOSE BLDC GLUCOMTR-MCNC: 181 MG/DL (ref 70–99)
GLUCOSE BLDC GLUCOMTR-MCNC: 207 MG/DL (ref 70–99)
GLUCOSE BLDC GLUCOMTR-MCNC: 231 MG/DL (ref 70–99)
GLUCOSE BLDC GLUCOMTR-MCNC: 258 MG/DL (ref 70–99)
GLUCOSE UR-MCNC: NORMAL MG/DL
HCT VFR BLD AUTO: 22.1 %
HEMOCCULT STL QL: POSITIVE
HGB BLD-MCNC: 7.8 G/DL
HGB UR QL STRIP.AUTO: NEGATIVE
KETONES UR-MCNC: NEGATIVE MG/DL
LEUKOCYTE ESTERASE UR QL STRIP.AUTO: NEGATIVE
MCH RBC QN AUTO: 30.8 PG (ref 26–34)
MCHC RBC AUTO-ENTMCNC: 35.3 G/DL (ref 31–37)
MCV RBC AUTO: 87.4 FL
NITRITE UR QL STRIP.AUTO: NEGATIVE
OSMOLALITY SERPL CALC.SUM OF ELEC: 330 MOSM/KG (ref 275–295)
PH UR: 5 [PH] (ref 5–8)
PHOSPHATE SERPL-MCNC: 7.3 MG/DL (ref 2.4–5.1)
PLATELET # BLD AUTO: 216 10(3)UL (ref 150–450)
POTASSIUM SERPL-SCNC: 4.8 MMOL/L (ref 3.5–5.1)
RBC # BLD AUTO: 2.53 X10(6)UL
SODIUM SERPL-SCNC: 10 MMOL/L
SODIUM SERPL-SCNC: 136 MMOL/L (ref 136–145)
SP GR UR STRIP: 1.01 (ref 1–1.03)
UROBILINOGEN UR STRIP-ACNC: NORMAL
WBC # BLD AUTO: 10.2 X10(3) UL (ref 4–11)

## 2024-04-05 PROCEDURE — 99232 SBSQ HOSP IP/OBS MODERATE 35: CPT | Performed by: INTERNAL MEDICINE

## 2024-04-05 PROCEDURE — 71045 X-RAY EXAM CHEST 1 VIEW: CPT | Performed by: INTERNAL MEDICINE

## 2024-04-05 PROCEDURE — 99221 1ST HOSP IP/OBS SF/LOW 40: CPT | Performed by: INTERNAL MEDICINE

## 2024-04-05 PROCEDURE — 99233 SBSQ HOSP IP/OBS HIGH 50: CPT | Performed by: HOSPITALIST

## 2024-04-05 RX ORDER — OLANZAPINE 2.5 MG/1
2.5 TABLET, FILM COATED ORAL NIGHTLY
Status: DISCONTINUED | OUTPATIENT
Start: 2024-04-05 | End: 2024-04-09

## 2024-04-05 RX ORDER — INSULIN DEGLUDEC 100 U/ML
22 INJECTION, SOLUTION SUBCUTANEOUS NIGHTLY
Status: DISCONTINUED | OUTPATIENT
Start: 2024-04-05 | End: 2024-04-09

## 2024-04-05 RX ORDER — FUROSEMIDE 10 MG/ML
40 INJECTION INTRAMUSCULAR; INTRAVENOUS ONCE
Status: COMPLETED | OUTPATIENT
Start: 2024-04-05 | End: 2024-04-05

## 2024-04-05 RX ORDER — GABAPENTIN 100 MG/1
100 CAPSULE ORAL NIGHTLY
Status: DISCONTINUED | OUTPATIENT
Start: 2024-04-05 | End: 2024-04-09

## 2024-04-05 NOTE — PLAN OF CARE
Problem: Diabetes/Glucose Control  Goal: Glucose maintained within prescribed range  Description: INTERVENTIONS:  - Monitor Blood Glucose as ordered  - Assess for signs and symptoms of hyperglycemia and hypoglycemia  - Administer ordered medications to maintain glucose within target range  - Assess barriers to adequate nutritional intake and initiate nutrition consult as needed  - Instruct patient on self management of diabetes  Outcome: Progressing     Problem: RESPIRATORY - ADULT  Goal: Achieves optimal ventilation and oxygenation  Description: INTERVENTIONS:  - Assess for changes in respiratory status  - Assess for changes in mentation and behavior  - Position to facilitate oxygenation and minimize respiratory effort  - Oxygen supplementation based on oxygen saturation or ABGs  - Provide Smoking Cessation handout, if applicable  - Encourage broncho-pulmonary hygiene including cough, deep breathe, Incentive Spirometry  - Assess the need for suctioning and perform as needed  - Assess and instruct to report SOB or any respiratory difficulty  - Respiratory Therapy support as indicated  - Manage/alleviate anxiety  - Monitor for signs/symptoms of CO2 retention  Outcome: Progressing     Problem: SAFETY ADULT - FALL  Goal: Free from fall injury  Description: INTERVENTIONS:  - Assess pt frequently for physical needs  - Identify cognitive and physical deficits and behaviors that affect risk of falls.  - [place] fall precautions as indicated by assessment.  - Educate pt/family on patient safety including physical limitations  - Instruct pt to call for assistance with activity based on assessment  - Modify environment to reduce risk of injury  - Provide assistive devices as appropriate  - Consider OT/PT consult to assist with strengthening/mobility  - Encourage toileting schedule  Outcome: Progressing     Problem: GENITOURINARY - ADULT  Goal: Absence of urinary retention  Description: INTERVENTIONS:  - Assess patient’s  ability to void and empty bladder  - Monitor intake/output and perform bladder scan as needed  - Follow urinary retention protocol/standard of care  - Consider collaborating with pharmacy to review patient's medication profile  - Implement strategies to promote bladder emptying  Outcome: Progressing     Problem: NEUROLOGICAL - ADULT  Goal: Achieves stable or improved neurological status  Description: INTERVENTIONS  - Assess for and report changes in neurological status  - Initiate measures to prevent increased intracranial pressure  - Maintain blood pressure and fluid volume within ordered parameters to optimize cerebral perfusion and minimize risk of hemorrhage  - Monitor temperature, glucose, and sodium. Initiate appropriate interventions as ordered  Outcome: Progressing  Goal: Absence of seizures  Description: INTERVENTIONS  - Monitor for seizure activity  - Administer anti-seizure medications as ordered  - Monitor neurological status  Outcome: Progressing     Problem: Patient/Family Goals  Goal: Patient/Family Long Term Goal  Description: Patient's Long Term Goal: respiratory relief    Interventions:  - continuous bipap  -frequent staff rounding  -respiratory meds  -monitor O2 sats  - See additional Care Plan goals for specific interventions  Outcome: Progressing  Goal: Patient/Family Short Term Goal  Description: Patient's Short Term Goal: discharge home    Interventions:   - Monitor vitals  - Monitor appropriate labs  - Administer medications as ordered  - Follow MD's orders  - Update patient on plan of care   - Discharge planning     - See additional Care Plan goals for specific interventions  Outcome: Progressing     No acute changes overnight, vital signs being monitored, frequent rounding by nursing staff, appropriate safety precautions in place, call light within reach.

## 2024-04-05 NOTE — PROGRESS NOTES
[facility]  part of [facility]     Progress Note    Ramón Bedoya Patient Status:  Inpatient    3/21/1950 MRN [id number]   Location [facility]5W Attending Aníbal Najera MD   Hosp Day # 3 PCP LEONEL CORTES       Subjective:   Patient seen and examined.  Resting in bed.  Overall improvement dyspnea.  Some ongoing cough present    Objective:   Blood pressure 112/52, pulse 62, temperature 97.7 °F (36.5 °C), temperature source Oral, resp. rate 20, weight 196 lb 4.8 oz (89 kg), SpO2 91%.  Intake/Output:   Last 3 shifts: I/O last 3 completed shifts:  In: 1175 [P.O.:1175]  Out: 950 [Urine:950]   This shift: I/O this shift:  In: 370 [P.O.:370]  Out: -      Vent Settings:      Hemodynamic parameters (last 24 hours):      Scheduled Meds:         Continuous Infusions:         Physical Exam  Constitutional: no acute distress  Eyes: PERRL  ENT: nares pateint  Neck: supple, no JVD  Cardio: RRR, S1 S2  Respiratory: Scattered Rales  GI: abdomen soft, non tender, active bowel sounds, no organomegaly  Extremities: no clubbing, cyanosis, edema  Neurologic: no gross motor deficits  Skin: warm, dry      Results:     Lab Results   Component Value Date    WBC 10.2 2024    HGB 7.8 2024    HCT 22.1 2024    .0 2024    CREATSERUM 6.43 2024     2024     2024    K 4.8 2024     2024    CO2 16.0 2024     2024    CA 7.5 2024    ALB 3.4 2024    PHOS 7.3 2024       XR CHEST AP PORTABLE  (CPT=71045)    Result Date: 2024  CONCLUSION:  1. Cardiomegaly. 2. Multifocal airspace opacification/multifocal pneumonitis with slight improvement    Dictated by (CST): Jaime Michelle MD on 2024 at 9:06 AM     Finalized by (CST): Jaime Michelle MD on 2024 at 9:08 AM                 Assessment   1.  Acute hypoxemic respiratory failure  2.  Influenza A  3.  Multifocal pneumonia  4.  Acute kidney injury  on chronic kidney disease  5.  Diabetes mellitus  6.  Anemia       Plan     -Patient presents with worsening dyspnea cough cold like symptoms for 3 to 4 days presentation prior to arrival.  Significant hypoxemic respiratory failure on presentation  -Viral screen positive for influenza A  -Chest x-ray with evidence of multifocal infiltrates concerning for pneumonia.  [facility] chest x-ray on 4/5/2024 with some improvement in multifocal airspace opacities seen.  -Wean steroid therapy  -Empiric antibiotic therapy at this time  -Blood cultures with no growth to date  -Wean oxygen as tolerated currently on 2 L.  -Nebulizer treatments  -Further recommendations per nephrology  -Increase activity as tolerated  -Worsening renal function noted but patient states he would not want to pursue dialysis.  -Patient made DNR/DNI.    Kay Mane DO  Pulmonary Critical Care Medicine  [facility]

## 2024-04-05 NOTE — SPIRITUAL CARE NOTE
Spiritual Care Visit Note    Patient Name: Ramón Bedoya Date of Spiritual Care Visit: 24   : 3/21/1950 Primary Dx: Dyspnea, unspecified type       Referred By: Referral From: Family;Nurse    Spiritual Care Taxonomy:    Intended Effects: Aligning care plan with patient's values    Methods: Collaborate with care team member;Offer support    Interventions: Assist someone with Advance Directives    Visit Type/Summary:     - PoA: New PoAH Created: Visited patient in response to PoA for Healthcare consult/request. Created a new PoAH for Healthcare document that, per the patient, accurately reflects their wishes. Gave the patient the original PoAH document along with copies. Confirmed that the PoAH primary agent name and contact information have been entered into the Epic, Advance Directives section. A copy of the new PoAH document has been placed on the patient's paper chart.    Spiritual Care support can be requested via an Epic consult. For urgent/immediate needs, please contact the On Call  at: [place]: ext [phone]    Rev. Wu Zuniga MDiv       [phone]

## 2024-04-05 NOTE — PROGRESS NOTES
[facility]  part of [facility]    Progress Note    Ramón Bedoya Patient Status:  Inpatient    3/21/1950 MRN [id number]   Location [facility]5W Attending Bria Montanez MD   Hosp Day # 3 PCP LEONEL CORTES     Chief Complaint:   Chief Complaint   Patient presents with    Body ache and/or chills    Shortness Of Breath   Wheezing     Subjective:   Ramón Bedoya feels better. No cough. SOB improved eating breakfast. On 2L NC. Says the doctors have told him he can go home - asking to go home.     Per night RN BG high had required several doses of insulin as high as 350's. Down to 2L NC o2. Uop overnight not documented.     Objective:   Objective:    Blood pressure 132/60, pulse 59, temperature 97.7 °F (36.5 °C), temperature source Oral, resp. rate 22, weight 196 lb 4.8 oz (89 kg), SpO2 91%.    Physical Exam:    General: No acute distress.   Respiratory: Clear to auscultation bilaterally. No wheezes. No rhonchi.  Cardiovascular: S1, S2. Regular rate and rhythm. No murmurs, rubs or gallops.   Abdomen: Soft, nontender, nondistended.  Positive bowel sounds. No rebound or guarding.  Neurologic: No focal neurological deficits.   Musculoskeletal: Moves all extremities.  Extremities: No edema.      Results:   Results:    Labs:  Recent Labs   Lab 24  1116 24  1122 24  0815 24  1523 24  0543 24  0515   WBC 11.0  --  6.0  --  9.2 10.2   HGB 8.9*  --  6.8* 8.6* 8.3* 7.8*   MCV 91.7  --  91.3  --  88.8 87.4   .0  --  171.0  --  197.0 216.0   INR  --  0.95  --   --   --   --        Recent Labs   Lab 24  1122 24  1313 24  0815 24  0543 24  0515   *  --  126* 215* 230*   BUN 79*  --  96* 113* 127*   CREATSERUM 5.35*  --  5.93* 6.09* 6.43*   CA 8.5*  --  7.6* 7.6* 7.5*   ALB 4.2 3.4 3.6 3.6  3.6 3.4   *  --  138 135* 136   K 4.6  --  5.1 4.9 4.8     --  108 106 107   CO2 18.0*  --  18.0* 17.0* 16.0*   ALKPHO 100 77   --  81  --    AST 65* 55*  --  39*  --    ALT 57* 50*  --  41  --    BILT 0.7 0.5  --  0.5  --    TP 7.5 6.3  --  6.5  --        Estimated Creatinine Clearance: 8 mL/min (A) (based on SCr of 6.43 mg/dL (H)).    Recent Labs   Lab 04/02/24  1122   PTP 13.2   INR 0.95            Culture:  Hospital Encounter on 04/02/24   1. Blood Culture     Status: None (Preliminary result)    Collection Time: 04/02/24 11:41 AM    Specimen: Blood,peripheral   Result Value Ref Range    Blood Culture Result No Growth 2 Days N/A       Cardiac  No results for input(s): \"TROP\", \"PBNP\" in the last 168 hours.      Imaging: Imaging data reviewed in [facility].  XR CHEST AP PORTABLE  (CPT=71045)    Result Date: 4/5/2024  CONCLUSION:  1. Cardiomegaly. 2. Multifocal airspace opacification/multifocal pneumonitis with slight improvement    Dictated by (CST): Jaime Michelle MD on 4/05/2024 at 9:06 AM     Finalized by (CST): Jaime Michelle MD on 4/05/2024 at 9:08 AM           Medications:    OLANZapine  2.5 mg Oral Nightly    gabapentin  100 mg Oral Nightly    iron sucrose  200 mg Intravenous Daily    insulin aspart  8 Units Subcutaneous TID CC    predniSONE  40 mg Oral Daily with breakfast    insulin degludec  20 Units Subcutaneous Nightly    insulin aspart  1-11 Units Subcutaneous TID CC and HS    heparin  5,000 Units Subcutaneous 2 times per day    cefTRIAXone  1 g Intravenous Q24H    vancomycin  125 mg Oral Daily    amiodarone  200 mg Oral Daily    amLODIPine  10 mg Oral Daily    atorvastatin  10 mg Oral Daily    calcitriol  0.25 mcg Oral Q MWF    furosemide  40 mg Oral Daily    hydrALAZINE  25 mg Oral TID    sodium bicarbonate  650 mg Oral BID    levETIRAcetam  500 mg Intravenous Q12H         Assessment and Plan:   Assessment & Plan:        Multifocal pneumonia   Influenza A infection  Acute hypoxemic respiratory failure   - Improving   - Influenza A positive on arrival, hold Tamiflu since out of window.   - CXR multifocal PNA with  improvement   - prednisone 40mg daily and taper as BG high   - Rocephin + azithromycin day #3  - wean o2 was on bipap on arrival.   - Pulm on consult.   - blood cx x 2 NGTD.   - IS/Flutter valve.   - May need some home o2      Acute kidney injury on CKD 4  Metabolic acidosis  Hyponatremia  Hypertensive nephropathy  Diabetic nephropathy  - baseline Cr around 3-3.5,  - BUN/Creat trending up a bit today. Uop not documented overnight   - stop IVF  - UA, urine electrolytes, bladder scan, eosinophil smear. .   - Renal following  - He declined dialysis even if needed.   - sodium bicarb PO      Acute on chronic Anemia  Anemia of chronic kidney disease  Iron def anemia   - hgb baseline 8-9 per EMR.   - hgb 8.9 on arrival  - was down to 6.8 --> received 1unit PRBC  - Hb down to 7.8 today.   - iron low --> Venofer.   - check FOB   - no evidene of bleeding      Elevated troponin  - trending down   - likely supple demand ischemia from hypoxia infection      Bradycardia  - HR around 50, lowest 45  - no BB, CCB or clonidine, continue to monitor on Tele     DM2, worse from steroids.   - A1c 6.6  - BG overnight 300's.   - Started on tresiba 20 units at bedtime. Novolog 8 units TID.   - high dose CF     HTN  HLD  - continue home meds, except losartan    Other medical problems  H/o SDH  H/o A.fib     >55min spent, >50% spent counseling and coordinating care in the form of educating pt/family and d/w consultants and staff. Most of the time spent discussing the above plan.      Dispo: possible home weekend   Plan of care discussed with patient or family at bedside.    Bria Montanez MD  Hospitalist          Supplementary Documentation:     Quality:  DVT Prophylaxis: heparin   CODE status: DNR  Dispo: per clinical course           Estimated date of discharge: TBD  Discharge is dependent on: clinical stability  At this point Mr. Bedoya is expected to be discharge to: TBD

## 2024-04-05 NOTE — PROGRESS NOTES
[facility]  part of [facility]    Progress Note    Ramón Bedoya Patient Status:  Inpatient    3/21/1950 MRN [id number]   Location [facility]5W Attending Bria Montanez MD   Hosp Day # 3 PCP LEONEL CORTES       Subjective:   Ramón Bedoya is a(n) 74 year old male who I am seeing for LG    Resting in bed.      Objective:   /60 (BP Location: Left arm)   Pulse 59   Temp 97.7 °F (36.5 °C) (Oral)   Resp 22   Wt 196 lb 4.8 oz (89 kg)   SpO2 91%   BMI 34.77 kg/m²      Intake/Output Summary (Last 24 hours) at 2024 1155  Last data filed at 2024 2148  Gross per 24 hour   Intake 480 ml   Output --   Net 480 ml     Wt Readings from Last 1 Encounters:   24 196 lb 4.8 oz (89 kg)       Exam  Gen: No acute distress, Heent: NC AT, mucous memb clear, neck supple  Pulm: Lungs clear, normal respiratory effort  CV: Heart with regular rate and rhythm, edema  Abd: Abdomen soft, nontender, nondistended, no organomegaly, bowel sounds present  Skin: no symptoms reported  Psych: alert and oriented    Assessment and Plan:     1 -LG  GFR is running much worse than his baseline.  I explained this to his daughters at the bedside on admission and to the patient today     This could be due to his acute illness of influenza     Will monitor his renal function.  However I will respect his wishes of not wanting to pursue dialysis and, which he has reiterated repeatedly.  He understands that should his kidney function worsen he may pass away due to renal failure.  He says he is okay with this.  He would prefer to die than be on dialysis     I will give him a dose of IV Lasix today for volume overload     2 -respiratory failure/influenza  Supportive care.       3 -hypertension with CKD  Blood pressure is running low.  Holding losartan     4 - DM 2 w nephropathy  Accu-Cheks     5 -anemia  Status post PRBC          Patient says he would like to switch his care over to me.  I advised him that would  be okay, but there is probably not much I can do if his kidney function continues to worsen and he does not want dialysis      Results:     Recent Labs   Lab 04/02/24  1116 04/03/24  0815 04/03/24  1523 04/04/24  0543 04/05/24  0515   RBC 2.88* 2.30*  --  2.77* 2.53*   HGB 8.9* 6.8* 8.6* 8.3* 7.8*   HCT 26.4* 21.0*  --  24.6* 22.1*   MCV 91.7 91.3  --  88.8 87.4   MCH 30.9 29.6  --  30.0 30.8   MCHC 33.7 32.4  --  33.7 35.3   RDW 14.0 14.1  --  14.0 14.2   NEPRELIM 9.72* 5.38  --  8.27*  --    WBC 11.0 6.0  --  9.2 10.2   .0 171.0  --  197.0 216.0         Recent Labs   Lab 04/03/24  0815 04/04/24  0543 04/05/24  0515   * 215* 230*   BUN 96* 113* 127*   CREATSERUM 5.93* 6.09* 6.43*   CA 7.6* 7.6* 7.5*    135* 136   K 5.1 4.9 4.8    106 107   CO2 18.0* 17.0* 16.0*          XR CHEST AP PORTABLE  (CPT=71045)    Result Date: 4/5/2024  CONCLUSION:  1. Cardiomegaly. 2. Multifocal airspace opacification/multifocal pneumonitis with slight improvement    Dictated by (CST): Jaime Michelle MD on 4/05/2024 at 9:06 AM     Finalized by (CST): Jaime Michelle MD on 4/05/2024 at 9:08 AM               ABHINAV GOMES MD  4/5/2024

## 2024-04-05 NOTE — PHYSICAL THERAPY NOTE
PHYSICAL THERAPY TREATMENT NOTE - INPATIENT     Room Number: 507/507-A       Presenting Problem: Influenzal bronchitis, dyspnea  Co-Morbidities : Hx DM2 with neuropathy, HTN, TBI 2023    Problem List  Principal Problem:    Dyspnea, unspecified type  Active Problems:    Hyponatremia    Anemia    Acute renal failure (ARF) (HCC)    Acute kidney injury (HCC)    Metabolic acidosis    Respiratory alkalosis    Hyperglycemia    Influenzal bronchitis    Palliative care by specialist      PHYSICAL THERAPY ASSESSMENT   Patient demonstrates fair progress this session, goals  remain in progress.    Patient continues to function below baseline with bed mobility, transfers, gait, stair negotiation, maintaining seated position, and standing prolonged periods.  Contributing factors to remaining limitations include decreased endurance/aerobic capacity, decreased muscular endurance, and increased O2 needs from baseline.  Next session anticipate patient to progress bed mobility, transfers, gait, and stair negotiation.  Physical Therapy will continue to follow patient for duration of hospitalization.    Patient continues to benefit from continued skilled PT services: at discharge to promote functional independence and safety with additional support and return home with home health PT.    PLAN  PT Treatment Plan: Gait training;Endurance;Stair training;Balance training  Frequency (Obs): 5x/week    SUBJECTIVE  \"I am still tired but feeling a bit better\"    OBJECTIVE  Precautions: Other (Comment) (droplet)    PAIN ASSESSMENT   Ratin  Location: denies       BALANCE  Static Sitting: Normal  Dynamic Sitting: Good  Static Standing: Good  Dynamic Standing: Fair +    O2 WALK  Oxygen Therapy  SPO2% on Oxygen at Rest: 95  At rest oxygen flow (liters per minute): 3  SPO2% Ambulation on Oxygen: 85  Ambulation oxygen flow (liters per minute): 3    AM-PAC '6-Clicks' INPATIENT SHORT FORM - BASIC MOBILITY  How much difficulty does the patient  currently have...  Patient Difficulty: Turning over in bed (including adjusting bedclothes, sheets and blankets)?: None   Patient Difficulty: Sitting down on and standing up from a chair with arms (e.g., wheelchair, bedside commode, etc.): A Little   Patient Difficulty: Moving from lying on back to sitting on the side of the bed?: A Little   How much help from another person does the patient currently need...   Help from Another: Moving to and from a bed to a chair (including a wheelchair)?: A Little   Help from Another: Need to walk in hospital room?: A Little   Help from Another: Climbing 3-5 steps with a railing?: A Little     AM-PAC Score:  Raw Score: 19   Approx Degree of Impairment: 41.77%   Standardized Score (AM-PAC Scale): 45.44   CMS Modifier (G-Code): CK    FUNCTIONAL ABILITY STATUS  Functional Mobility/Gait Assessment  Gait Assistance: Supervision  Distance (ft): 50'x2 with seated rest  Assistive Device: None (pt steady without RW, wants to try to do it without AD but does understand rationale behind use of RW for energy conservation)  Pattern: Shuffle (wide THIEN with decreased lateral weight shift, decreased heel toe)  Stairs:  (pt does not feel up to trying stairs)  Rolling: independent  Supine to Sit: supervision  Sit to Supine: supervision  Sit to Stand: supervision    Additional information: ERYN Tapia approves participation in therapy session, pt has been walking to bathroom with RN staff on 2-3L today. He presents resting in bed but agreeable to therapy session. He reports feeling overall tired but a bit better today. He needs cues for pacing and energy conservation throughout session as well as coordinating breathing with activity. With walking and activity SpO2 drops to 85% on 3L, HR 78. He recovers in 1 min to 90% and then one more minute to 95% w/HR 69. He needs help to manage O2 lines during session. Education and performs seated LE exs including diaphragmatic breathing, coordination breathing  with walking, exs.  Discussion about walking throughout the day w/staff and sitting up in chair for ALL meals here and at home to improve breathing. He reports he wants to go home without oxygen and aware he needs to more move to help clear lungs. Daughter in room at end of session with spiritual care member Wu pt has all needs in reach and RN Regina aware    The patient's Approx Degree of Impairment: 41.77% has been calculated based on documentation in the [facility] '6 clicks' Inpatient Daily Activity Short Form.  Research supports that patients with this level of impairment may benefit from home with HHPT and 24 hr care and this is the recommendation.  Final disposition will be made by interdisciplinary medical team.    THERAPEUTIC EXERCISES  Lower Extremity Alternating marching  Ankle pumps  Heel raises  LAQ  Quad sets  Toe raises     Position Sitting & Standing       Patient End of Session: Up in chair;With  staff;Needs met;Call light within reach;RN aware of session/findings;All patient questions and concerns addressed;Family present    CURRENT GOALS     Goals to be met by: 4/14/24  Patient Goal Patient's self-stated goal is: return home safely   Goal #1 Patient is able to demonstrate supine - sit EOB @ level: independent      Goal #1   Current Status  Progressing - supervision, cues for sitting EOB a short time to ensure he's not dizzy prior to standing   Goal #2 Patient is able to demonstrate [place] [facility] [place] assistance level: independent with none      Goal #2  Current Status  progressing - supervision, assist to manage O2   Goal #3 Patient is able to ambulate 150 feet with assist device: none at assistance level: independent   Goal #3   Current Status  Progressing - Supervision walking 50'x2 with no device and seated rest break, SpO2 drops to 85% and needs 1 min to recover to 90% even with performing PLB during walking   Goal #4 Patient will negotiate 15 stairs/one curb w/ assistive device and  supervision   Goal #4   Current Status  NT, pt does not feel up to trying stairs   Goal #5 Patient to demonstrate independence with home activity/exercise instructions provided to patient in preparation for discharge.   Goal #5   Current Status  In progress   Goal #6     Goal #6  Current Status         Gait Trainin minutes  Therapeutic Activity: 12 minutes

## 2024-04-05 NOTE — PLAN OF CARE
Problem: Diabetes/Glucose Control  Goal: Glucose maintained within prescribed range  Description: INTERVENTIONS:  - Monitor Blood Glucose as ordered  - Assess for signs and symptoms of hyperglycemia and hypoglycemia  - Administer ordered medications to maintain glucose within target range  - Assess barriers to adequate nutritional intake and initiate nutrition consult as needed  - Instruct patient on self management of diabetes  Outcome: Progressing     Problem: RESPIRATORY - ADULT  Goal: Achieves optimal ventilation and oxygenation  Description: INTERVENTIONS:  - Assess for changes in respiratory status  - Assess for changes in mentation and behavior  - Position to facilitate oxygenation and minimize respiratory effort  - Oxygen supplementation based on oxygen saturation or ABGs  - Provide Smoking Cessation handout, if applicable  - Encourage broncho-pulmonary hygiene including cough, deep breathe, Incentive Spirometry  - Assess the need for suctioning and perform as needed  - Assess and instruct to report SOB or any respiratory difficulty  - Respiratory Therapy support as indicated  - Manage/alleviate anxiety  - Monitor for signs/symptoms of CO2 retention  Outcome: Progressing     Problem: SAFETY ADULT - FALL  Goal: Free from fall injury  Description: INTERVENTIONS:  - Assess pt frequently for physical needs  - Identify cognitive and physical deficits and behaviors that affect risk of falls.  - [place] fall precautions as indicated by assessment.  - Educate pt/family on patient safety including physical limitations  - Instruct pt to call for assistance with activity based on assessment  - Modify environment to reduce risk of injury  - Provide assistive devices as appropriate  - Consider OT/PT consult to assist with strengthening/mobility  - Encourage toileting schedule  Outcome: Progressing     Problem: GENITOURINARY - ADULT  Goal: Absence of urinary retention  Description: INTERVENTIONS:  - Assess patient’s  ability to void and empty bladder  - Monitor intake/output and perform bladder scan as needed  - Follow urinary retention protocol/standard of care  - Consider collaborating with pharmacy to review patient's medication profile  - Implement strategies to promote bladder emptying  Outcome: Progressing     Problem: NEUROLOGICAL - ADULT  Goal: Achieves stable or improved neurological status  Description: INTERVENTIONS  - Assess for and report changes in neurological status  - Initiate measures to prevent increased intracranial pressure  - Maintain blood pressure and fluid volume within ordered parameters to optimize cerebral perfusion and minimize risk of hemorrhage  - Monitor temperature, glucose, and sodium. Initiate appropriate interventions as ordered  Outcome: Progressing  Goal: Absence of seizures  Description: INTERVENTIONS  - Monitor for seizure activity  - Administer anti-seizure medications as ordered  - Monitor neurological status  Outcome: Progressing     Problem: Patient/Family Goals  Goal: Patient/Family Long Term Goal  Description: Patient's Long Term Goal: respiratory relief    Interventions:  - continuous bipap  -frequent staff rounding  -respiratory meds  -monitor O2 sats  - See additional Care Plan goals for specific interventions  Outcome: Progressing  Goal: Patient/Family Short Term Goal  Description: Patient's Short Term Goal: discharge home    Interventions:   - Monitor vitals  - Monitor appropriate labs  - Administer medications as ordered  - Follow MD's orders  - Update patient on plan of care   - Discharge planning   - See additional Care Plan goals for specific interventions  Outcome: Progressing     Problem: METABOLIC/FLUID AND ELECTROLYTES - ADULT  Goal: Electrolytes maintained within normal limits  Description: INTERVENTIONS:  - Monitor labs and rhythm and assess patient for signs and symptoms of electrolyte imbalances  - Administer electrolyte replacement as ordered  - Monitor response to  electrolyte replacements, including rhythm and repeat lab results as appropriate  - Fluid restriction as ordered  - Instruct patient on fluid and nutrition restrictions as appropriate  Outcome: Progressing     Problem: METABOLIC/FLUID AND ELECTROLYTES - ADULT  Goal: Hemodynamic stability and optimal renal function maintained  Description: INTERVENTIONS:  - Monitor labs and assess for signs and symptoms of volume excess or deficit  - Monitor intake, output and patient weight  - Monitor urine specific gravity, serum osmolarity and serum sodium as indicated or ordered  - Monitor response to interventions for patient's volume status, including labs, urine output, blood pressure (other measures as available)  - Encourage oral intake as appropriate  - Instruct patient on fluid and nutrition restrictions as appropriate  Outcome: Not Progressing

## 2024-04-06 LAB
ALBUMIN SERPL-MCNC: 3.4 G/DL (ref 3.2–4.8)
ANION GAP SERPL CALC-SCNC: 12 MMOL/L (ref 0–18)
BUN BLD-MCNC: 142 MG/DL (ref 9–23)
BUN/CREAT SERPL: 20.2 (ref 10–20)
CALCIUM BLD-MCNC: 7.4 MG/DL (ref 8.7–10.4)
CHLORIDE SERPL-SCNC: 108 MMOL/L (ref 98–112)
CO2 SERPL-SCNC: 17 MMOL/L (ref 21–32)
CREAT BLD-MCNC: 7.04 MG/DL
DEPRECATED RDW RBC AUTO: 47.1 FL (ref 35.1–46.3)
EGFRCR SERPLBLD CKD-EPI 2021: 8 ML/MIN/1.73M2 (ref 60–?)
ERYTHROCYTE [DISTWIDTH] IN BLOOD BY AUTOMATED COUNT: 14.3 % (ref 11–15)
GLUCOSE BLD-MCNC: 155 MG/DL (ref 70–99)
GLUCOSE BLDC GLUCOMTR-MCNC: 128 MG/DL (ref 70–99)
GLUCOSE BLDC GLUCOMTR-MCNC: 279 MG/DL (ref 70–99)
GLUCOSE BLDC GLUCOMTR-MCNC: 284 MG/DL (ref 70–99)
GLUCOSE BLDC GLUCOMTR-MCNC: 296 MG/DL (ref 70–99)
HCT VFR BLD AUTO: 23.6 %
HGB BLD-MCNC: 8 G/DL
MCH RBC QN AUTO: 30.4 PG (ref 26–34)
MCHC RBC AUTO-ENTMCNC: 33.9 G/DL (ref 31–37)
MCV RBC AUTO: 89.7 FL
OSMOLALITY SERPL CALC.SUM OF ELEC: 333 MOSM/KG (ref 275–295)
PHOSPHATE SERPL-MCNC: 8.3 MG/DL (ref 2.4–5.1)
PLATELET # BLD AUTO: 248 10(3)UL (ref 150–450)
POTASSIUM SERPL-SCNC: 4.8 MMOL/L (ref 3.5–5.1)
RBC # BLD AUTO: 2.63 X10(6)UL
SODIUM SERPL-SCNC: 137 MMOL/L (ref 136–145)
WBC # BLD AUTO: 11 X10(3) UL (ref 4–11)

## 2024-04-06 PROCEDURE — 99223 1ST HOSP IP/OBS HIGH 75: CPT | Performed by: INTERNAL MEDICINE

## 2024-04-06 PROCEDURE — 99233 SBSQ HOSP IP/OBS HIGH 50: CPT | Performed by: INTERNAL MEDICINE

## 2024-04-06 PROCEDURE — 99233 SBSQ HOSP IP/OBS HIGH 50: CPT | Performed by: HOSPITALIST

## 2024-04-06 RX ORDER — ALBUTEROL SULFATE 2.5 MG/3ML
2.5 SOLUTION RESPIRATORY (INHALATION) 3 TIMES DAILY
Status: DISCONTINUED | OUTPATIENT
Start: 2024-04-06 | End: 2024-04-09

## 2024-04-06 NOTE — DISCHARGE INSTRUCTIONS
Sometimes managing your health at home requires assistance.  The [facility] team has recognized your preference to use [facility].  They can be reached by phone at [phone].  The fax number for your reference is [phone].  A representative from the home health agency will contact you or your family to schedule your first visit.       PCP referral line:     [phone]     [facility] to follow at discharge. Please call [facility] with any questions, they can be reached by phone at [phone].     Outpatient hemodialysis services will be provided by:    [place] [address]  Phone: [phone]  Fax: [phone]    Your chair time will be every Tuesday, Thursday, and Saturday at 10:20am.

## 2024-04-06 NOTE — CM/SW NOTE
Per chart review, PT rec is [facility]. Tentative home health referral pending in [place], need to follow up with list/choice*    Orders/f2f placed.     Holly Hess, MSW, LSW    [id number]

## 2024-04-06 NOTE — PROGRESS NOTES
Update:   FOB + on labs. H/H stable and no overt bleeding seen.     > Hold heparin. Place SCD  > IV PPI BID  > GI consult   > Doubt pt would be agreeable to endoscopy will disuss tomorrow since he is acutely ill can hold off on endoscopy unless overt bleeding or drop in H/H   > IV venofer.   > monitor     Bria Montanez MD

## 2024-04-06 NOTE — PROGRESS NOTES
[facility]  part of [facility]    Progress Note    Ramón Bedoya Patient Status:  Inpatient    3/21/1950 MRN [id number]   Location [facility]5W Attending Bria Montanez MD   Hosp Day # 4 PCP LEONEL CORTES       Subjective:   Ramón Bedoya is a(n) 74 year old male who I am seeing for LG/CKD    In bed, on 2 L NC  Daughter at bedside      Objective:   /58 (BP Location: Left arm)   Pulse 61   Temp 97.2 °F (36.2 °C) (Oral)   Resp 18   Wt 192 lb 8 oz (87.3 kg)   SpO2 95%   BMI 34.10 kg/m²      Intake/Output Summary (Last 24 hours) at 2024 1343  Last data filed at 2024 1000  Gross per 24 hour   Intake 710 ml   Output 200 ml   Net 510 ml     Wt Readings from Last 1 Encounters:   24 192 lb 8 oz (87.3 kg)       Exam  Vital signs: Blood pressure 126/58, pulse 61, temperature 97.2 °F (36.2 °C), temperature source Oral, resp. rate 18, weight 192 lb 8 oz (87.3 kg), SpO2 95%.    General: No acute distress. Awake   HEENT: Moist mucous membranes. EOMI. PERRLA  Neck:  No JVD.   Respiratory: Clear to auscultation bilaterally.    Cardiovascular: S1, S2.  Regular rate and rhythm.   Abdomen: Soft, nontender, nondistended.    Neurologic: No focal neurological deficits.  Musculoskeletal: Full range of motion of all extremities.   + edema  Access:    Results:     Recent Labs   Lab 24  1116 24  0815 24  1523 24  0543 24  0515 24  0420   RBC 2.88* 2.30*  --  2.77* 2.53* 2.63*   HGB 8.9* 6.8*   < > 8.3* 7.8* 8.0*   HCT 26.4* 21.0*  --  24.6* 22.1* 23.6*   MCV 91.7 91.3  --  88.8 87.4 89.7   MCH 30.9 29.6  --  30.0 30.8 30.4   MCHC 33.7 32.4  --  33.7 35.3 33.9   RDW 14.0 14.1  --  14.0 14.2 14.3   NEPRELIM 9.72* 5.38  --  8.27*  --   --    WBC 11.0 6.0  --  9.2 10.2 11.0   .0 171.0  --  197.0 216.0 248.0    < > = values in this interval not displayed.         Recent Labs   Lab 24  0543 24  0515 24  0420   * 230* 155*    * 127* 142*   CREATSERUM 6.09* 6.43* 7.04*   CA 7.6* 7.5* 7.4*   * 136 137   K 4.9 4.8 4.8    107 108   CO2 17.0* 16.0* 17.0*          XR CHEST AP PORTABLE  (CPT=71045)    Result Date: 4/5/2024  CONCLUSION:  1. Cardiomegaly. 2. Multifocal airspace opacification/multifocal pneumonitis with slight improvement    Dictated by (CST): Jaime Michelle MD on 4/05/2024 at 9:06 AM     Finalized by (CST): Jaime Michelle MD on 4/05/2024 at 9:08 AM           Assessment and Plan:     Impression:    LG likely in setting of his acute illness/influenza, recently getting worse  CKD V (eGFR 14 ml/min and follows at [facility])  he has previously wished not to pursue dialysis  Respiratory failure/influenza, supportive care  Hypertension with CKD.  On amlodipine and hydralazine  Diabetes with nephropathy, ISS  Anemia, this post PRBC.  On  IV iron  Secondary hyperparathyroidism, on calcitriol      Plan:    Renal function continues to get worse every day.  Discussion with patient regarding his refusal to dialyze,  daughter and RN at bedside .discussed the need for HD, explained the procedure , discussed risks/benefits and the ability to refuse after starting dialysis and not wanting to pursue.  All his questions were answered in detail.  Patient has agreed to proceed with dialysis and we will plan for a permanent catheter and initiation of dialysis either on Sunday or Monday.  Needs social work consult  for placement.  Nixon Montanez      Thank you very much for allowing me to participate in the care of your patient.  If you have any questions, please do not hesitate to contact me.     Nano Harper MD  4/6/2024

## 2024-04-06 NOTE — CM/SW NOTE
List of accepting HH agencies provided to patient's daughter. [facility] is chosen agency at dc. Agency reserved in [place]ID[place], liaison [place] also notified. Pt now on 1L O2 (no home O2). CM anticipates patient will be weaned of O2 prior to dc.    Plan: Home w/spouse with [facility] pending medical clearance.    Yong Robles, ARNULFON    [phone]

## 2024-04-06 NOTE — PROGRESS NOTES
[facility]  part of [facility]    Progress Note    Ramón Bedoya Patient Status:  Inpatient    3/21/1950 MRN [id number]   Location [facility]5W Attending Bria Montanez MD   Hosp Day # 4 PCP LEONEL CORTES     Chief Complaint:   Chief Complaint   Patient presents with    Body ache and/or chills    Shortness Of Breath   Wheezing     Subjective:   Ramón Bedoya  is up to the chair. Now agreeable to dialysis after speaking with Dr Harper. Feels breathing is better no cough. Dtr Yadi at bedside. Had BM but brown in color. No abd pain was a little lethargic this morning per Yadi who is at the bedside   Objective:   Objective:    Blood pressure 130/60, pulse 65, temperature 97.5 °F (36.4 °C), temperature source Oral, resp. rate 16, weight 192 lb 8 oz (87.3 kg), SpO2 95%.    Physical Exam:    General: No acute distress.   Respiratory: Clear to auscultation bilaterally. No wheezes. No rhonchi.  Cardiovascular: S1, S2. Regular rate and rhythm. No murmurs, rubs or gallops.   Abdomen: Soft, nontender, nondistended.  Positive bowel sounds. No rebound or guarding.  Neurologic: No focal neurological deficits.   Musculoskeletal: Moves all extremities.  Extremities: No edema.      Results:   Results:    Labs:  Recent Labs   Lab 24  1116 24  1122 24  0815 24  1523 24  0543 24  0515 24  0420   WBC 11.0  --  6.0  --  9.2 10.2 11.0   HGB 8.9*  --  6.8* 8.6* 8.3* 7.8* 8.0*   MCV 91.7  --  91.3  --  88.8 87.4 89.7   .0  --  171.0  --  197.0 216.0 248.0   INR  --  0.95  --   --   --   --   --        Recent Labs   Lab 24  1122 24  1313 24  0815 24  0543 24  0515 24  0420   *  --    < > 215* 230* 155*   BUN 79*  --    < > 113* 127* 142*   CREATSERUM 5.35*  --    < > 6.09* 6.43* 7.04*   CA 8.5*  --    < > 7.6* 7.5* 7.4*   ALB 4.2 3.4   < > 3.6  3.6 3.4 3.4   *  --    < > 135* 136 137   K 4.6  --    < > 4.9 4.8 4.8      --    < > 106 107 108   CO2 18.0*  --    < > 17.0* 16.0* 17.0*   ALKPHO 100 77  --  81  --   --    AST 65* 55*  --  39*  --   --    ALT 57* 50*  --  41  --   --    BILT 0.7 0.5  --  0.5  --   --    TP 7.5 6.3  --  6.5  --   --     < > = values in this interval not displayed.       Estimated Creatinine Clearance: 7.3 mL/min (A) (based on SCr of 7.04 mg/dL (H)).    Recent Labs   Lab 04/02/24  1122   PTP 13.2   INR 0.95            Culture:  Hospital Encounter on 04/02/24   1. Blood Culture     Status: None (Preliminary result)    Collection Time: 04/02/24 11:41 AM    Specimen: Blood,peripheral   Result Value Ref Range    Blood Culture Result No Growth 3 Days N/A       Cardiac  No results for input(s): \"TROP\", \"PBNP\" in the last 168 hours.      Imaging: Imaging data reviewed in [facility].  XR CHEST AP PORTABLE  (CPT=71045)    Result Date: 4/5/2024  CONCLUSION:  1. Cardiomegaly. 2. Multifocal airspace opacification/multifocal pneumonitis with slight improvement    Dictated by (CST): Jaime Michelle MD on 4/05/2024 at 9:06 AM     Finalized by (CST): Jaime Michelle MD on 4/05/2024 at 9:08 AM           Medications:    OLANZapine  2.5 mg Oral Nightly    gabapentin  100 mg Oral Nightly    pantoprazole  40 mg Intravenous Q12H    insulin degludec  22 Units Subcutaneous Nightly    insulin aspart  8 Units Subcutaneous TID CC    iron sucrose  200 mg Intravenous Daily    predniSONE  40 mg Oral Daily with breakfast    insulin aspart  1-11 Units Subcutaneous TID CC and HS    cefTRIAXone  1 g Intravenous Q24H    vancomycin  125 mg Oral Daily    amiodarone  200 mg Oral Daily    amLODIPine  10 mg Oral Daily    atorvastatin  10 mg Oral Daily    calcitriol  0.25 mcg Oral Q MWF    hydrALAZINE  25 mg Oral TID    sodium bicarbonate  650 mg Oral BID    levETIRAcetam  500 mg Intravenous Q12H         Assessment and Plan:   Assessment & Plan:        Multifocal pneumonia   Influenza A infection  Acute hypoxemic respiratory failure    - Improving   - Influenza A positive on arrival, hold Tamiflu since out of window.   - CXR multifocal PNA with improvement   - prednisone 40mg daily and taper as BG high   - Rocephin + azithromycin day #4  - wean o2 was on bipap on arrival.   - Pulm on consult.   - blood cx x 2 NGTD.   - IS/Flutter valve.   - May need some home o2      Acute kidney injury on CKD 4  Metabolic acidosis  Hyponatremia  Hypertensive nephropathy  Diabetic nephropathy  - baseline Cr around 3-3.5,  - BUN/Creat trending up.   - Renal following  - Agreeable to dialysis.   - d/w IR plans for permcath tomorrow   - NPO ater MN      Acute on chronic Anemia  Anemia of chronic kidney disease  Iron def anemia   - hgb baseline 8-9 per EMR.   - hgb 8.9 on arrival  - was down to 6.8 --> received 1unit PRBC  - iron low --> Venofer.   - FOB +   - IV PPI BID.   - GI consult.   - no overt bleeding seen. Would hold off on endosopy unless overt bleeding or drop in H/H   - can consider outpt once pulm status improved.      Elevated troponin  - trending down   - likely supple demand ischemia from hypoxia infection      Bradycardia  - HR around 50, lowest 45  - no BB, CCB or clonidine, continue to monitor on Tele     DM2, worse from steroids.   - A1c 6.6  - BG overnight 300's.   - tresiba 202units at bedtime. Novolog 8 units TID.   - high dose CF     HTN  HLD  - continue home meds, except losartan    Other medical problems  H/o SDH  H/o A.fib     >55min spent, >50% spent counseling and coordinating care in the form of educating pt/family and d/w consultants and staff. Most of the time spent discussing the above plan.      Dispo: possible home weekend   Plan of care discussed with patient or family at bedside.    Bria Montanez MD  Hospitalist          Supplementary Documentation:     Quality:  DVT Prophylaxis: heparin   CODE status: DNR  Dispo: per clinical course           Estimated date of discharge: TBD  Discharge is dependent on: clinical stability  At  this point Mr. Bedoya is expected to be discharge to: TBD

## 2024-04-06 NOTE — PROGRESS NOTES
[facility]  part of [facility]    Progress Note    Ramón Bedoya Patient Status:  Inpatient    3/21/1950 MRN [id number]   Location [facility]5W Attending Bria Montanez MD   Hosp Day # 4 PCP LEONEL CORTES         Subjective:     Constitutional:  Positive for fatigue.   HENT:  Negative for congestion.    Respiratory:  Positive for cough, shortness of breath and wheezing.    Cardiovascular:  Negative for chest pain.   Gastrointestinal:  Negative for abdominal pain.   Skin:  Negative for rash.   Neurological:  Negative for seizures.   Psychiatric/Behavioral:  Negative for agitation.      Seen and examined  Up to the chair on 1 L of oxygen  Occasional cough and wheezes  Clinically improving  No fever  Generalized fatigue and weakness  Objective:   Blood pressure 130/60, pulse 65, temperature 97.5 °F (36.4 °C), temperature source Oral, resp. rate 16, weight 192 lb 8 oz (87.3 kg), SpO2 95%.  Physical Exam  Constitutional:       General: He is not in acute distress.     Appearance: He is obese. He is ill-appearing.   HENT:      Head: Atraumatic.   Eyes:      General: No scleral icterus.  Cardiovascular:      Rate and Rhythm: Normal rate.   Pulmonary:      Breath sounds: Rales present.      Comments: Diminished breath sounds bilaterally  Few expiratory wheezes  Mild basilar rales  No rhonchi  Abdominal:      General: Bowel sounds are normal.      Palpations: Abdomen is soft.      Tenderness: There is no guarding or rebound.   Musculoskeletal:      Cervical back: No rigidity.      Comments: Trace pitting edema in lower extremities   Neurological:      Mental Status: Mental status is at baseline.         Results:   Lab Results   Component Value Date    WBC 11.0 2024    HGB 8.0 (L) 2024    HCT 23.6 (L) 2024    .0 2024    CREATSERUM 7.04 (H) 2024     (H) 2024     2024    K 4.8 2024     2024    CO2 17.0 (L) 2024      (H) 04/06/2024    CA 7.4 (L) 04/06/2024    ALB 3.4 04/06/2024    ALKPHO 81 04/04/2024    BILT 0.5 04/04/2024    TP 6.5 04/04/2024    AST 39 (H) 04/04/2024    ALT 41 04/04/2024    PTT 36.3 (H) 04/02/2024    INR 0.95 04/02/2024    MG 2.7 (H) 08/23/2023    PHOS 8.3 (H) 04/06/2024    TROPHS 77 (HH) 04/04/2024       XR CHEST AP PORTABLE  (CPT=71045)    Result Date: 4/5/2024  CONCLUSION:  1. Cardiomegaly. 2. Multifocal airspace opacification/multifocal pneumonitis with slight improvement    Dictated by (CST): Jaime Michelle MD on 4/05/2024 at 9:06 AM     Finalized by (CST): Jaime Michelle MD on 4/05/2024 at 9:08 AM               Assessment & Plan:       1-influenza A with pneumonia and bronchospasm with acute respiratory failure with hypoxia  Chest x-ray with multifocal infiltrate  O2 requirement now 1 L nasal cannula  Empiric antibiotics Rocephin and azithromycin  On prednisone at 40 mg daily will taper down  Still with some wheezes and will add scheduled nebulizers  Home O2 prior to discharge    2-acute on chronic kidney injury  Getting worse with low urine output  Likely will require dialysis  Renal following    3-acute on chronic anemia  Hemoglobin stable today with no evidence of active bleeding    4-HFpEF     5- DM   Insulin     6-DVT prophylaxis  Off anticoagulation with anemia and drop of hemoglobin    7- DNR \/select     Complex , high risk           Roosevelt Shelby MD  4/6/2024

## 2024-04-06 NOTE — PLAN OF CARE
Monitoring vitals. Bed is in lowest setting, locked with bed alarm  on and has call light within reach. Patient denied any pain overnight. Frequent rounding on patient by nursing staff.       Problem: Diabetes/Glucose Control  Goal: Glucose maintained within prescribed range  Description: INTERVENTIONS:  - Monitor Blood Glucose as ordered  - Assess for signs and symptoms of hyperglycemia and hypoglycemia  - Administer ordered medications to maintain glucose within target range  - Assess barriers to adequate nutritional intake and initiate nutrition consult as needed  - Instruct patient on self management of diabetes  Outcome: Progressing     Problem: RESPIRATORY - ADULT  Goal: Achieves optimal ventilation and oxygenation  Description: INTERVENTIONS:  - Assess for changes in respiratory status  - Assess for changes in mentation and behavior  - Position to facilitate oxygenation and minimize respiratory effort  - Oxygen supplementation based on oxygen saturation or ABGs  - Provide Smoking Cessation handout, if applicable  - Encourage broncho-pulmonary hygiene including cough, deep breathe, Incentive Spirometry  - Assess the need for suctioning and perform as needed  - Assess and instruct to report SOB or any respiratory difficulty  - Respiratory Therapy support as indicated  - Manage/alleviate anxiety  - Monitor for signs/symptoms of CO2 retention  Outcome: Progressing     Problem: SAFETY ADULT - FALL  Goal: Free from fall injury  Description: INTERVENTIONS:  - Assess pt frequently for physical needs  - Identify cognitive and physical deficits and behaviors that affect risk of falls.  - [place] fall precautions as indicated by assessment.  - Educate pt/family on patient safety including physical limitations  - Instruct pt to call for assistance with activity based on assessment  - Modify environment to reduce risk of injury  - Provide assistive devices as appropriate  - Consider OT/PT consult to assist with  strengthening/mobility  - Encourage toileting schedule  Outcome: Progressing     Problem: GENITOURINARY - ADULT  Goal: Absence of urinary retention  Description: INTERVENTIONS:  - Assess patient’s ability to void and empty bladder  - Monitor intake/output and perform bladder scan as needed  - Follow urinary retention protocol/standard of care  - Consider collaborating with pharmacy to review patient's medication profile  - Implement strategies to promote bladder emptying  Outcome: Progressing     Problem: NEUROLOGICAL - ADULT  Goal: Achieves stable or improved neurological status  Description: INTERVENTIONS  - Assess for and report changes in neurological status  - Initiate measures to prevent increased intracranial pressure  - Maintain blood pressure and fluid volume within ordered parameters to optimize cerebral perfusion and minimize risk of hemorrhage  - Monitor temperature, glucose, and sodium. Initiate appropriate interventions as ordered  Outcome: Progressing  Goal: Absence of seizures  Description: INTERVENTIONS  - Monitor for seizure activity  - Administer anti-seizure medications as ordered  - Monitor neurological status  Outcome: Progressing     Problem: Patient/Family Goals  Goal: Patient/Family Long Term Goal  Description: Patient's Long Term Goal: respiratory relief    Interventions:  - continuous bipap  -frequent staff rounding  -respiratory meds  -monitor O2 sats  - See additional Care Plan goals for specific interventions  Outcome: Progressing  Goal: Patient/Family Short Term Goal  Description: Patient's Short Term Goal: discharge home    Interventions:   - Monitor vitals  - Monitor appropriate labs  - Administer medications as ordered  - Follow MD's orders  - Update patient on plan of care   - Discharge planning     - See additional Care Plan goals for specific interventions  Outcome: Progressing     Problem: METABOLIC/FLUID AND ELECTROLYTES - ADULT  Goal: Electrolytes maintained within normal  limits  Description: INTERVENTIONS:  - Monitor labs and rhythm and assess patient for signs and symptoms of electrolyte imbalances  - Administer electrolyte replacement as ordered  - Monitor response to electrolyte replacements, including rhythm and repeat lab results as appropriate  - Fluid restriction as ordered  - Instruct patient on fluid and nutrition restrictions as appropriate  Outcome: Progressing     Problem: METABOLIC/FLUID AND ELECTROLYTES - ADULT  Goal: Hemodynamic stability and optimal renal function maintained  Description: INTERVENTIONS:  - Monitor labs and assess for signs and symptoms of volume excess or deficit  - Monitor intake, output and patient weight  - Monitor urine specific gravity, serum osmolarity and serum sodium as indicated or ordered  - Monitor response to interventions for patient's volume status, including labs, urine output, blood pressure (other measures as available)  - Encourage oral intake as appropriate  - Instruct patient on fluid and nutrition restrictions as appropriate  Outcome: Not Progressing

## 2024-04-06 NOTE — CONSULTS
[facility]   Gastroenterology Consultation Note    Ramón Bedoya  Patient Status:    Inpatient  Date of Admission:         4/2/2024, Hospital day #4  Attending:   Bria Montanez MD  PCP:     LEONEL CORTES    Reason for Consultation:  anemia    History of Present Illness:  Ramón Bedoya is a a(n) 74 year old male w/ a history of CKD4, TBI, HTN, DM, who presents with respiratory distress secondary to influenza A infection. Gi consulted for anemia. Acute on chronic anemia hgb noted to be 6.8 s/p 1 unit prbc to hgb 8. Patient denies overt gi bleeding such as melena or hematochezia. No abdominal pain, nausea or emesis. No heartburn or dysphagia. No prior endoscopic evaluation.     History:  Past Medical History:   Diagnosis Date    Chronic kidney disease, stage 4 (severe) (HCC)     Diabetes (HCC)     Essential hypertension     TBI (traumatic brain injury) (HCC)      Past Surgical History:   Procedure Laterality Date    BRAIN SURGERY  08/17/2023    bilat craniotomy for evacuation of subdural hematoma     History reviewed. No pertinent family history.   reports that he has never smoked. He has never used smokeless tobacco. He reports that he does not currently use alcohol. He reports that he does not use drugs.    Allergies:  Allergies   Allergen Reactions    Penicillins UNKNOWN       Medications:    Current Facility-Administered Medications:     [START ON 4/7/2024] predniSONE (Deltasone) tab 30 mg, 30 mg, Oral, Daily with breakfast    albuterol (Ventolin) (2.5 MG/3ML) 0.083% nebulizer solution 2.5 mg, 2.5 mg, Nebulization, TID    OLANZapine (ZyPREXA) tab 2.5 mg, 2.5 mg, Oral, Nightly    gabapentin (Neurontin) cap 100 mg, 100 mg, Oral, Nightly    pantoprazole (Protonix) 40 mg in sodium chloride 0.9% PF 10 mL IV push, 40 mg, Intravenous, Q12H    insulin degludec 100 units/mL flextouch 22 Units, 22 Units, Subcutaneous, Nightly    insulin aspart (NovoLOG) 100 Units/mL FlexPen 8 Units, 8 Units, Subcutaneous, TID  CC    iron sucrose (Venofer) 20 mg/mL injection 200 mg, 200 mg, Intravenous, Daily    ipratropium-albuterol (Duoneb) 0.5-2.5 (3) MG/3ML inhalation solution 3 mL, 3 mL, Nebulization, Q6H PRN    glucose (Dex4) 15 GM/59ML oral liquid 15 g, 15 g, Oral, Q15 Min PRN **OR** glucose (Glutose) 40% oral gel 15 g, 15 g, Oral, Q15 Min PRN **OR** glucose-vitamin C (Dex-4) chewable tab 4 tablet, 4 tablet, Oral, Q15 Min PRN **OR** dextrose 50% injection 50 mL, 50 mL, Intravenous, Q15 Min PRN **OR** glucose (Dex4) 15 GM/59ML oral liquid 30 g, 30 g, Oral, Q15 Min PRN **OR** glucose (Glutose) 40% oral gel 30 g, 30 g, Oral, Q15 Min PRN **OR** glucose-vitamin C (Dex-4) chewable tab 8 tablet, 8 tablet, Oral, Q15 Min PRN    insulin aspart (NovoLOG) 100 Units/mL FlexPen 1-11 Units, 1-11 Units, Subcutaneous, TID CC and HS    acetaminophen (Tylenol Extra Strength) tab 500 mg, 500 mg, Oral, Q4H PRN    ondansetron (Zofran) 4 MG/2ML injection 4 mg, 4 mg, Intravenous, Q6H PRN    temazepam (Restoril) cap 15 mg, 15 mg, Oral, Nightly PRN    guaiFENesin (Robitussin) 100 MG/5 ML oral liquid 200 mg, 200 mg, Oral, Q4H PRN    benzonatate (Tessalon) cap 200 mg, 200 mg, Oral, TID PRN    cefTRIAXone (Rocephin) 1 g in D5W 100 mL IVPB-ADD, 1 g, Intravenous, Q24H    vancomycin (Vancocin) cap 125 mg, 125 mg, Oral, Daily    amiodarone (Pacerone) tab 200 mg, 200 mg, Oral, Daily    amLODIPine (Norvasc) tab 10 mg, 10 mg, Oral, Daily    atorvastatin (Lipitor) tab 10 mg, 10 mg, Oral, Daily    calcitriol (Rocaltrol) cap 0.25 mcg, 0.25 mcg, Oral, Q MWF    hydrALAZINE (Apresoline) tab 25 mg, 25 mg, Oral, TID    sodium bicarbonate tab 650 mg, 650 mg, Oral, BID    levETIRAcetam (Keppra) 500 mg/5mL injection 500 mg, 500 mg, Intravenous, Q12H  Medications Prior to Admission   Medication Sig Dispense Refill Last Dose    hydrALAZINE 25 MG Oral Tab Take 1 tablet (25 mg total) by mouth 3 (three) times daily.   4/1/2024    insulin glargine 100 UNIT/ML Subcutaneous Solution  Inject 6 Units into the skin nightly.   4/1/2024    sodium bicarbonate 650 MG Oral Tab Take 1 tablet (650 mg total) by mouth 2 (two) times daily.   4/1/2024    losartan 25 MG Oral Tab Take 1 tablet (25 mg total) by mouth daily.   4/1/2024    levETIRAcetam 500 MG Oral Tab Take 1 tablet (500 mg total) by mouth 2 (two) times daily.   4/1/2024    furosemide 40 MG Oral Tab Take 1 tablet (40 mg total) by mouth daily.   4/1/2024    atorvastatin 10 MG Oral Tab Take 1 tablet (10 mg total) by mouth daily.   4/1/2024    amiodarone 200 MG Oral Tab Take 1 tablet (200 mg total) by mouth daily.   4/1/2024    amLODIPine 10 MG Oral Tab Take 1 tablet (10 mg total) by mouth daily. 30 tablet 0 4/1/2024    acetaminophen 500 MG Oral Tab Take 1 tablet (500 mg total) by mouth every 4 (four) hours as needed for Fever or Pain. 120 tablet 0 Past Week    Naloxone HCl 4 MG/0.1ML Nasal Liquid 4 mg by Nasal route as needed. If patient remains unresponsive, repeat dose in other nostril 2-5 minutes after first dose. 1 kit 0 Past Month    BD PEN NEEDLE MINI U/F 31G X 5 MM Does not apply Misc Take 1 Bottle by mouth As Directed.       Continuous Blood Gluc Sensor (FREESTYLE NANDO 2 SENSOR) Does not apply Misc USE 1 SENSOR EVERY 2 WEEKS       Continuous Blood Gluc  (FREESTYLE NANDO 2 READER) Does not apply Device USE EVERY 6 HOURS       calcitriol 0.25 MCG Oral Cap Take 1 capsule (0.25 mcg total) by mouth Every Monday, Wednesday, and Friday.       divalproex  MG Oral Tab EC DR tab Take 1 tablet (500 mg total) by mouth 3 (three) times daily.       Ferrous Sulfate 324 (65 Fe) MG Oral Tab EC Take 1 tablet by mouth daily.          Review of Systems:  CONSTITUTIONAL:  negative for fevers, rigors  EYES:  negative for diplopia   RESPIRATORY:  negative for severe shortness of breath  CARDIOVASCULAR:  negative for crushing sub-sternal chest pain  GASTROINTESTINAL:  see HPI  GENITOURINARY:  negative for dysuria or gross  hematuria  INTEGUMENT/BREAST:  SKIN:  negative for jaundice   ALLERGIC/IMMUNOLOGIC:  negative for hay fever  ENDOCRINE:  negative for cold intolerance and heat intolerance  MUSCULOSKELETAL:  negative for joint effusion/severe erythema  NEURO: negative for dizziness or loss of conscious or paresthesias  BEHAVIOR/PSYCH:  negative for psychotic behavior    Physical Exam:    Blood pressure 126/58, pulse 61, temperature 97.2 °F (36.2 °C), temperature source Oral, resp. rate 18, weight 192 lb 8 oz (87.3 kg), SpO2 95%. Body mass index is 34.1 kg/m².    General: awake, alert and oriented, no acute distress  HEENT: moist mucus membranes  PULM: no conversational dyspnea  CARDIOVASCULAR: regular rate and rhythm, the extremities are warm and well perfused  GI: soft, non-tender, non-distended, + BS, no rebound/guarding   EXTREMITIES: no edema, moving all extremities  SKIN: no visible rash  NEURO: appropriate and interactive    Laboratory Data:  Lab Results   Component Value Date    WBC 11.0 04/06/2024    HGB 8.0 04/06/2024    HCT 23.6 04/06/2024    .0 04/06/2024    CREATSERUM 7.04 04/06/2024     04/06/2024     04/06/2024    K 4.8 04/06/2024     04/06/2024    CO2 17.0 04/06/2024     04/06/2024    CA 7.4 04/06/2024    ALB 3.4 04/06/2024    PHOS 8.3 04/06/2024       Imaging:  XR CHEST AP PORTABLE  (CPT=71045)    Result Date: 4/5/2024  CONCLUSION:  1. Cardiomegaly. 2. Multifocal airspace opacification/multifocal pneumonitis with slight improvement    Dictated by (CST): Jaime Michelle MD on 4/05/2024 at 9:06 AM     Finalized by (CST): Jaime Michelle MD on 4/05/2024 at 9:08 AM           Assessment & Plan   Ramón Bedoya is a a(n) 74 year old male w/ a history of CKD4, TBI, HTN, DM, who presents with respiratory distress secondary to influenza A infection. Gi consulted for anemia. Acute on chronic anemia hgb noted to be 6.8 s/p 1 unit prbc to hgb 8. No overt bleeding. Iron studies more  suggestive of anemia of chronic disease likely secondary to progressive CKD now stage V. Cannot exclude occult GI bleed from etiologies such as malignancy, AVM, PUD. Empiric PPI. No plans for emergent endoscopic evaluation unless overt bleeding or ongoing need for transfusions. He is a poor candidate for sedation at this time given influenza induced respiratory distress. He may follow-up GI clinic upon discharge, outpatient endoscopic evaluation can be discussed.     D/w Dr Tarik HOWELL will sign off, please call with questions      Blossom Carlson MD  [facility] Gastroenterology  4/6/2024    This note was partially prepared using Dragon Medical voice recognition dictation software. As a result, errors may occur. When identified, these errors have been corrected. While every attempt is made to correct errors during dictation, discrepancies may still exist.

## 2024-04-06 NOTE — PLAN OF CARE
Problem: Diabetes/Glucose Control  Goal: Glucose maintained within prescribed range  Description: INTERVENTIONS:  - Monitor Blood Glucose as ordered  - Assess for signs and symptoms of hyperglycemia and hypoglycemia  - Administer ordered medications to maintain glucose within target range  - Assess barriers to adequate nutritional intake and initiate nutrition consult as needed  - Instruct patient on self management of diabetes  Outcome: Progressing     Problem: RESPIRATORY - ADULT  Goal: Achieves optimal ventilation and oxygenation  Description: INTERVENTIONS:  - Assess for changes in respiratory status  - Assess for changes in mentation and behavior  - Position to facilitate oxygenation and minimize respiratory effort  - Oxygen supplementation based on oxygen saturation or ABGs  - Provide Smoking Cessation handout, if applicable  - Encourage broncho-pulmonary hygiene including cough, deep breathe, Incentive Spirometry  - Assess the need for suctioning and perform as needed  - Assess and instruct to report SOB or any respiratory difficulty  - Respiratory Therapy support as indicated  - Manage/alleviate anxiety  - Monitor for signs/symptoms of CO2 retention  Outcome: Progressing     Problem: SAFETY ADULT - FALL  Goal: Free from fall injury  Description: INTERVENTIONS:  - Assess pt frequently for physical needs  - Identify cognitive and physical deficits and behaviors that affect risk of falls.  - [place] fall precautions as indicated by assessment.  - Educate pt/family on patient safety including physical limitations  - Instruct pt to call for assistance with activity based on assessment  - Modify environment to reduce risk of injury  - Provide assistive devices as appropriate  - Consider OT/PT consult to assist with strengthening/mobility  - Encourage toileting schedule  Outcome: Progressing     Problem: GENITOURINARY - ADULT  Goal: Absence of urinary retention  Description: INTERVENTIONS:  - Assess patient’s  ability to void and empty bladder  - Monitor intake/output and perform bladder scan as needed  - Follow urinary retention protocol/standard of care  - Consider collaborating with pharmacy to review patient's medication profile  - Implement strategies to promote bladder emptying  Outcome: Progressing     Problem: METABOLIC/FLUID AND ELECTROLYTES - ADULT  Goal: Electrolytes maintained within normal limits  Description: INTERVENTIONS:  - Monitor labs and rhythm and assess patient for signs and symptoms of electrolyte imbalances  - Administer electrolyte replacement as ordered  - Monitor response to electrolyte replacements, including rhythm and repeat lab results as appropriate  - Fluid restriction as ordered  - Instruct patient on fluid and nutrition restrictions as appropriate  Outcome: Progressing  Goal: Hemodynamic stability and optimal renal function maintained  Description: INTERVENTIONS:  - Monitor labs and assess for signs and symptoms of volume excess or deficit  - Monitor intake, output and patient weight  - Monitor urine specific gravity, serum osmolarity and serum sodium as indicated or ordered  - Monitor response to interventions for patient's volume status, including labs, urine output, blood pressure (other measures as available)  - Encourage oral intake as appropriate  - Instruct patient on fluid and nutrition restrictions as appropriate  Outcome: Progressing     Weaned to room air. Up to chair for meals. Tolerating renal diet. Will be NPO after 0000 for anticipated permacath dialysis catheter placement tomorrow.   Safety precautions in place, frequent nursing rounding completed, call light within reach. All questions answered and needs met.

## 2024-04-07 ENCOUNTER — APPOINTMENT (OUTPATIENT)
Dept: INTERVENTIONAL RADIOLOGY/VASCULAR | Facility: HOSPITAL | Age: 74
End: 2024-04-07
Attending: INTERNAL MEDICINE
Payer: MEDICARE

## 2024-04-07 PROBLEM — N18.5 STAGE 5 CHRONIC KIDNEY DISEASE NOT ON CHRONIC DIALYSIS (HCC): Status: ACTIVE | Noted: 2024-04-07

## 2024-04-07 LAB
ALBUMIN SERPL-MCNC: 3.4 G/DL (ref 3.2–4.8)
ALBUMIN/GLOB SERPL: 1.3 {RATIO} (ref 1–2)
ALP LIVER SERPL-CCNC: 69 U/L
ALT SERPL-CCNC: 30 U/L
ANION GAP SERPL CALC-SCNC: 13 MMOL/L (ref 0–18)
AST SERPL-CCNC: 21 U/L (ref ?–34)
BILIRUB SERPL-MCNC: 0.3 MG/DL (ref 0.2–1.1)
BUN BLD-MCNC: 150 MG/DL (ref 9–23)
BUN/CREAT SERPL: 19.6 (ref 10–20)
CALCIUM BLD-MCNC: 7.7 MG/DL (ref 8.7–10.4)
CHLORIDE SERPL-SCNC: 106 MMOL/L (ref 98–112)
CO2 SERPL-SCNC: 16 MMOL/L (ref 21–32)
CREAT BLD-MCNC: 7.65 MG/DL
DEPRECATED RDW RBC AUTO: 45.7 FL (ref 35.1–46.3)
EGFRCR SERPLBLD CKD-EPI 2021: 7 ML/MIN/1.73M2 (ref 60–?)
ERYTHROCYTE [DISTWIDTH] IN BLOOD BY AUTOMATED COUNT: 14.1 % (ref 11–15)
GLOBULIN PLAS-MCNC: 2.7 G/DL (ref 2.8–4.4)
GLUCOSE BLD-MCNC: 142 MG/DL (ref 70–99)
GLUCOSE BLDC GLUCOMTR-MCNC: 113 MG/DL (ref 70–99)
GLUCOSE BLDC GLUCOMTR-MCNC: 120 MG/DL (ref 70–99)
GLUCOSE BLDC GLUCOMTR-MCNC: 163 MG/DL (ref 70–99)
GLUCOSE BLDC GLUCOMTR-MCNC: 216 MG/DL (ref 70–99)
HBV CORE AB SERPL QL IA: NONREACTIVE
HBV SURFACE AB SER QL: NONREACTIVE
HBV SURFACE AB SERPL IA-ACNC: <3.1 MIU/ML
HBV SURFACE AG SER-ACNC: 0.2 [IU]/L
HBV SURFACE AG SER-ACNC: <0.1 [IU]/L
HBV SURFACE AG SERPL QL IA: NONREACTIVE
HBV SURFACE AG SERPL QL IA: NONREACTIVE
HCT VFR BLD AUTO: 22.1 %
HGB BLD-MCNC: 7.6 G/DL
MCH RBC QN AUTO: 30.3 PG (ref 26–34)
MCHC RBC AUTO-ENTMCNC: 34.4 G/DL (ref 31–37)
MCV RBC AUTO: 88 FL
OSMOLALITY SERPL CALC.SUM OF ELEC: 331 MOSM/KG (ref 275–295)
PLATELET # BLD AUTO: 236 10(3)UL (ref 150–450)
POTASSIUM SERPL-SCNC: 4.8 MMOL/L (ref 3.5–5.1)
PROT SERPL-MCNC: 6.1 G/DL (ref 5.7–8.2)
RBC # BLD AUTO: 2.51 X10(6)UL
SODIUM SERPL-SCNC: 135 MMOL/L (ref 136–145)
WBC # BLD AUTO: 9.4 X10(3) UL (ref 4–11)

## 2024-04-07 PROCEDURE — 99233 SBSQ HOSP IP/OBS HIGH 50: CPT | Performed by: HOSPITALIST

## 2024-04-07 PROCEDURE — 99232 SBSQ HOSP IP/OBS MODERATE 35: CPT | Performed by: INTERNAL MEDICINE

## 2024-04-07 PROCEDURE — 0JH63XZ INSERTION OF TUNNELED VASCULAR ACCESS DEVICE INTO CHEST SUBCUTANEOUS TISSUE AND FASCIA, PERCUTANEOUS APPROACH: ICD-10-PCS | Performed by: RADIOLOGY

## 2024-04-07 PROCEDURE — 5A1D70Z PERFORMANCE OF URINARY FILTRATION, INTERMITTENT, LESS THAN 6 HOURS PER DAY: ICD-10-PCS | Performed by: INTERNAL MEDICINE

## 2024-04-07 PROCEDURE — 99233 SBSQ HOSP IP/OBS HIGH 50: CPT | Performed by: INTERNAL MEDICINE

## 2024-04-07 PROCEDURE — 02HV33Z INSERTION OF INFUSION DEVICE INTO SUPERIOR VENA CAVA, PERCUTANEOUS APPROACH: ICD-10-PCS | Performed by: RADIOLOGY

## 2024-04-07 RX ORDER — HEPARIN SODIUM 1000 [USP'U]/ML
1.5 INJECTION, SOLUTION INTRAVENOUS; SUBCUTANEOUS
Status: COMPLETED | OUTPATIENT
Start: 2024-04-07 | End: 2024-04-07

## 2024-04-07 RX ORDER — MIDAZOLAM HYDROCHLORIDE 1 MG/ML
INJECTION INTRAMUSCULAR; INTRAVENOUS
Status: COMPLETED
Start: 2024-04-07 | End: 2024-04-07

## 2024-04-07 RX ORDER — ALBUMIN (HUMAN) 12.5 G/50ML
25 SOLUTION INTRAVENOUS
Status: DISCONTINUED | OUTPATIENT
Start: 2024-04-07 | End: 2024-04-07

## 2024-04-07 RX ORDER — HEPARIN SODIUM 1000 [USP'U]/ML
INJECTION, SOLUTION INTRAVENOUS; SUBCUTANEOUS
Status: COMPLETED
Start: 2024-04-07 | End: 2024-04-07

## 2024-04-07 RX ORDER — LIDOCAINE HYDROCHLORIDE 20 MG/ML
INJECTION, SOLUTION EPIDURAL; INFILTRATION; INTRACAUDAL; PERINEURAL
Status: COMPLETED
Start: 2024-04-07 | End: 2024-04-07

## 2024-04-07 RX ORDER — ALBUMIN (HUMAN) 12.5 G/50ML
25 SOLUTION INTRAVENOUS
Status: DISCONTINUED | OUTPATIENT
Start: 2024-04-07 | End: 2024-04-08

## 2024-04-07 NOTE — CM/SW NOTE
JUVENAL received for outpatient HD at [facility]. Dialysis referrals sent in [facility]. Hep B panel ordered. Pt currently off the floor, chart unavailable at the time of this note. Hep B pancel results and HD flowsheets will be need to sent in [facility] once available. Residential HH already reserved for HH at [facility]. Pt has been weaned off O2.    Plan: Nanci ew/spouse with [facility] and outpatient HD pending Hep B panel, HD flowsheets, provider choice/chair time, and medical clearance.    ADALBERTO Zhu    [phone]

## 2024-04-07 NOTE — HOME CARE LIAISON
Late entry from 4/6/24     Received referral via [facility] for Home Health services.. [place] Spoke w/ patient and daughter Jennifer at the bedside and area agreeable for home health care. Message was left with daughter Jennifer at [phone]. Updated AVS with contact information Will remain available for any and all home health care needs

## 2024-04-07 NOTE — PLAN OF CARE
Problem: Diabetes/Glucose Control  Goal: Glucose maintained within prescribed range  Description: INTERVENTIONS:  - Monitor Blood Glucose as ordered  - Assess for signs and symptoms of hyperglycemia and hypoglycemia  - Administer ordered medications to maintain glucose within target range  - Assess barriers to adequate nutritional intake and initiate nutrition consult as needed  - Instruct patient on self management of diabetes  Outcome: Progressing     Problem: RESPIRATORY - ADULT  Goal: Achieves optimal ventilation and oxygenation  Description: INTERVENTIONS:  - Assess for changes in respiratory status  - Assess for changes in mentation and behavior  - Position to facilitate oxygenation and minimize respiratory effort  - Oxygen supplementation based on oxygen saturation or ABGs  - Provide Smoking Cessation handout, if applicable  - Encourage broncho-pulmonary hygiene including cough, deep breathe, Incentive Spirometry  - Assess the need for suctioning and perform as needed  - Assess and instruct to report SOB or any respiratory difficulty  - Respiratory Therapy support as indicated  - Manage/alleviate anxiety  - Monitor for signs/symptoms of CO2 retention  Outcome: Progressing     Problem: SAFETY ADULT - FALL  Goal: Free from fall injury  Description: INTERVENTIONS:  - Assess pt frequently for physical needs  - Identify cognitive and physical deficits and behaviors that affect risk of falls.  - [place] fall precautions as indicated by assessment.  - Educate pt/family on patient safety including physical limitations  - Instruct pt to call for assistance with activity based on assessment  - Modify environment to reduce risk of injury  - Provide assistive devices as appropriate  - Consider OT/PT consult to assist with strengthening/mobility  - Encourage toileting schedule  Outcome: Progressing     Problem: GENITOURINARY - ADULT  Goal: Absence of urinary retention  Description: INTERVENTIONS:  - Assess patient’s  ability to void and empty bladder  - Monitor intake/output and perform bladder scan as needed  - Follow urinary retention protocol/standard of care  - Consider collaborating with pharmacy to review patient's medication profile  - Implement strategies to promote bladder emptying  Outcome: Progressing     Problem: NEUROLOGICAL - ADULT  Goal: Achieves stable or improved neurological status  Description: INTERVENTIONS  - Assess for and report changes in neurological status  - Initiate measures to prevent increased intracranial pressure  - Maintain blood pressure and fluid volume within ordered parameters to optimize cerebral perfusion and minimize risk of hemorrhage  - Monitor temperature, glucose, and sodium. Initiate appropriate interventions as ordered  Outcome: Progressing  Goal: Absence of seizures  Description: INTERVENTIONS  - Monitor for seizure activity  - Administer anti-seizure medications as ordered  - Monitor neurological status  Outcome: Progressing     Problem: METABOLIC/FLUID AND ELECTROLYTES - ADULT  Goal: Electrolytes maintained within normal limits  Description: INTERVENTIONS:  - Monitor labs and rhythm and assess patient for signs and symptoms of electrolyte imbalances  - Administer electrolyte replacement as ordered  - Monitor response to electrolyte replacements, including rhythm and repeat lab results as appropriate  - Fluid restriction as ordered  - Instruct patient on fluid and nutrition restrictions as appropriate  Outcome: Progressing  Goal: Hemodynamic stability and optimal renal function maintained  Description: INTERVENTIONS:  - Monitor labs and assess for signs and symptoms of volume excess or deficit  - Monitor intake, output and patient weight  - Monitor urine specific gravity, serum osmolarity and serum sodium as indicated or ordered  - Monitor response to interventions for patient's volume status, including labs, urine output, blood pressure (other measures as available)  - Encourage  oral intake as appropriate  - Instruct patient on fluid and nutrition restrictions as appropriate  Outcome: Progressing     Problem: PAIN - ADULT  Goal: Verbalizes/displays adequate comfort level or patient's stated pain goal  Description: INTERVENTIONS:  - Encourage pt to monitor pain and request assistance  - Assess pain using appropriate pain scale  - Administer analgesics based on type and severity of pain and evaluate response  - Implement non-pharmacological measures as appropriate and evaluate response  - Consider cultural and social influences on pain and pain management  - Manage/alleviate anxiety  - Utilize distraction and/or relaxation techniques  - Monitor for opioid side effects  - Notify MD/LIP if interventions unsuccessful or patient reports new pain  - Anticipate increased pain with activity and pre-medicate as appropriate  Outcome: Progressing     Patient went to IR for tunneled dialysis catheter placement today. Vital signs stable on room air on return to unit. Hemodialysis session completed today, 1000 ml output per [facility] RN. Education on hemodialysis management provided by [facility] RN, teaching reinforced with this RN.   Safety precautions in place, frequent nursing rounding completed, call light within reach. All questions answered and needs met.

## 2024-04-07 NOTE — PROGRESS NOTES
[facility]  part of [facility]  Brief IR Post-Procedure Note    Ramón Bedoya Patient Status:  Inpatient    3/21/1950 MRN [id number]   Location [facility]5W Attending Bria Montanez MD   Hosp Day # 5 PCP LEONEL CORTES     Procedure(s): Right internal jugular vein tunneled catheter placement    Pre-Procedure Diagnosis: End-stage renal disease requiring hemodialysis  Post-Procedure Diagnosis: Same    Anesthesia:  Sedation  Fluoroscopy time: 2.1 minutes / 18 mgy  Findings: Right neck and chest were prepped and draped in the typical sterile fashion.  1% lidocaine was used for subcutaneous anesthesia.  Under continuous sonographic guidance a 21-gauge micropuncture needle was advanced into the right internal jugular vein.  An 018 wire was advanced centrally.  This was exchanging a transitional dilator for an 035 Glidewire which was advanced into the IVC.  A subcutaneous tunnel was made in the right chest wall soft tissues.  A 19 cm dual-lumen hemodialysis catheter was advanced through the tunnel and then through a peel-away sheath to the level of the cavoatrial junction.  Both lumens were aspirated and flushed with heparin.  Secured with 2-0 suture.  Sterile dressings applied.    Specimens: None    Blood Loss:  <5mL      Complications:  None    Plan: Right internal jugular vein 19 cm tunneled hemodialysis catheter flushed and ready for use.    Erica Moss MD  2024

## 2024-04-07 NOTE — PROGRESS NOTES
[facility]  part of [facility]    Progress Note    Ramón Bedoya Patient Status:  Inpatient    3/21/1950 MRN [id number]   Location [facility]5W Attending Bria Montanez MD   Hosp Day # 5 PCP LEONEL CORTES       Subjective:   Ramón Bedoya is a(n) 74 year old male who I am seeing for LG/CKD    I saw him earlier today  Reviewed HD again , and agreeable to it now    Objective:   /54 (BP Location: Left arm)   Pulse 67   Temp 97.3 °F (36.3 °C) (Oral)   Resp 12   Wt 197 lb 14.4 oz (89.8 kg)   SpO2 92%   BMI 35.06 kg/m²      Intake/Output Summary (Last 24 hours) at 2024 1048  Last data filed at 2024 0851  Gross per 24 hour   Intake 748 ml   Output 625 ml   Net 123 ml     Wt Readings from Last 1 Encounters:   24 197 lb 14.4 oz (89.8 kg)       Exam  Vital signs: Blood pressure 123/54, pulse 67, temperature 97.3 °F (36.3 °C), temperature source Oral, resp. rate 12, weight 197 lb 14.4 oz (89.8 kg), SpO2 92%.    General: No acute distress. Awake   HEENT: Moist mucous membranes. EOMI. PERRLA  Neck:  No JVD.   Respiratory: Clear to auscultation bilaterally.    Cardiovascular: S1, S2.  Regular rate and rhythm.   Abdomen: Soft, nontender, nondistended.    Neurologic: No focal neurological deficits.  Musculoskeletal: Full range of motion of all extremities.   + edema  Access:    Results:     Recent Labs   Lab 24  1116 24  0815 24  1523 24  0543 24  0515 24  0420 24  0511   RBC 2.88* 2.30*  --  2.77* 2.53* 2.63* 2.51*   HGB 8.9* 6.8*   < > 8.3* 7.8* 8.0* 7.6*   HCT 26.4* 21.0*  --  24.6* 22.1* 23.6* 22.1*   MCV 91.7 91.3  --  88.8 87.4 89.7 88.0   MCH 30.9 29.6  --  30.0 30.8 30.4 30.3   MCHC 33.7 32.4  --  33.7 35.3 33.9 34.4   RDW 14.0 14.1  --  14.0 14.2 14.3 14.1   NEPRELIM 9.72* 5.38  --  8.27*  --   --   --    WBC 11.0 6.0  --  9.2 10.2 11.0 9.4   .0 171.0  --  197.0 216.0 248.0 236.0    < > = values in this interval not  displayed.         Recent Labs   Lab 04/05/24  0515 04/06/24  0420 04/07/24  0511   * 155* 142*   * 142* 150*   CREATSERUM 6.43* 7.04* 7.65*   CA 7.5* 7.4* 7.7*    137 135*   K 4.8 4.8 4.8    108 106   CO2 16.0* 17.0* 16.0*          No results found.    Assessment and Plan:     Impression:    LG likely in setting of his acute illness/influenza, recently getting worse  CKD V (eGFR 14 ml/min and follows at [facility])  he has previously wished not to pursue dialysis  Respiratory failure/influenza, supportive care  Hypertension with CKD.  On amlodipine and hydralazine  Diabetes with nephropathy, ISS  Anemia, this post PRBC.  On  IV iron  Secondary hyperparathyroidism, on calcitriol      Plan:    Plan perm cath today and HD for  2 hrs today  Sw consult for HD placement  Will order ORLY starting tomm    Thank you very much for allowing me to participate in the care of your patient.  If you have any questions, please do not hesitate to contact me.     Nano Harper MD

## 2024-04-07 NOTE — PROGRESS NOTES
[facility]  part of [facility]    Progress Note    Ramón Bedoya Patient Status:  Inpatient    3/21/1950 MRN [id number]   Location [facility]5W Attending Bria Montanez MD   Hosp Day # 5 PCP LEONEL CORTES     Chief Complaint:   Chief Complaint   Patient presents with    Body ache and/or chills    Shortness Of Breath   Wheezing     Subjective:   Ramón Bedoya  is going for dialyssi cath placement. Denies any complaints. Slept well overnight   Objective:   Objective:    Blood pressure 123/54, pulse 67, temperature 97.3 °F (36.3 °C), temperature source Oral, resp. rate 12, weight 197 lb 14.4 oz (89.8 kg), SpO2 92%.    Physical Exam:    General: No acute distress.   Respiratory: Clear to auscultation bilaterally. No wheezes. No rhonchi.  Cardiovascular: S1, S2. Regular rate and rhythm. No murmurs, rubs or gallops.   Abdomen: Soft, nontender, nondistended.  Positive bowel sounds. No rebound or guarding.  Neurologic: No focal neurological deficits.   Musculoskeletal: Moves all extremities.  Extremities: No edema.      Results:   Results:    Labs:  Recent Labs   Lab 24  1122 24  0815 24  1523 24  0543 24  0515 24  0420 24  0511   WBC  --  6.0  --  9.2 10.2 11.0 9.4   HGB  --  6.8* 8.6* 8.3* 7.8* 8.0* 7.6*   MCV  --  91.3  --  88.8 87.4 89.7 88.0   PLT  --  171.0  --  197.0 216.0 248.0 236.0   INR 0.95  --   --   --   --   --   --        Recent Labs   Lab 24  1313 24  0815 24  0543 24  0515 24  0420 24  0511   GLU  --    < > 215* 230* 155* 142*   BUN  --    < > 113* 127* 142* 150*   CREATSERUM  --    < > 6.09* 6.43* 7.04* 7.65*   CA  --    < > 7.6* 7.5* 7.4* 7.7*   ALB 3.4   < > 3.6  3.6 3.4 3.4 3.4   NA  --    < > 135* 136 137 135*   K  --    < > 4.9 4.8 4.8 4.8   CL  --    < > 106 107 108 106   CO2  --    < > 17.0* 16.0* 17.0* 16.0*   ALKPHO 77  --  81  --   --  69   AST 55*  --  39*  --   --  21   ALT 50*  --  41   --   --  30   BILT 0.5  --  0.5  --   --  0.3   TP 6.3  --  6.5  --   --  6.1    < > = values in this interval not displayed.       Estimated Creatinine Clearance: 6.7 mL/min (A) (based on SCr of 7.65 mg/dL (H)).    Recent Labs   Lab 04/02/24  1122   PTP 13.2   INR 0.95            Culture:  Hospital Encounter on 04/02/24   1. Blood Culture     Status: None (Preliminary result)    Collection Time: 04/02/24 11:41 AM    Specimen: Blood,peripheral   Result Value Ref Range    Blood Culture Result No Growth 4 Days N/A       Cardiac  No results for input(s): \"TROP\", \"PBNP\" in the last 168 hours.      Imaging: Imaging data reviewed in Epic.  No results found.    Medications:    predniSONE  30 mg Oral Daily with breakfast    albuterol  2.5 mg Nebulization TID    OLANZapine  2.5 mg Oral Nightly    gabapentin  100 mg Oral Nightly    pantoprazole  40 mg Intravenous Q12H    insulin degludec  22 Units Subcutaneous Nightly    insulin aspart  8 Units Subcutaneous TID CC    iron sucrose  200 mg Intravenous Daily    insulin aspart  1-11 Units Subcutaneous TID CC and HS    cefTRIAXone  1 g Intravenous Q24H    vancomycin  125 mg Oral Daily    amiodarone  200 mg Oral Daily    amLODIPine  10 mg Oral Daily    atorvastatin  10 mg Oral Daily    calcitriol  0.25 mcg Oral Q MWF    hydrALAZINE  25 mg Oral TID    sodium bicarbonate  650 mg Oral BID    levETIRAcetam  500 mg Intravenous Q12H         Assessment and Plan:   Assessment & Plan:        Multifocal pneumonia   Influenza A infection  Acute hypoxemic respiratory failure   - Improving   - Influenza A positive on arrival, hold Tamiflu since out of window.   - CXR multifocal PNA with improvement   - prednisone 40mg daily and taper as BG high   - Rocephin + azithromycin day #5  - wean o2 was on bipap on arrival.   - Pulm on consult.   - blood cx x 2 NGTD.   - IS/Flutter valve.   - May need some home o2      Acute kidney injury on CKD 4  Metabolic acidosis  Hyponatremia  Hypertensive  nephropathy  Diabetic nephropathy  - baseline Cr around 3-3.5,  - BUN/Creat trending up.   - Renal following  - Agreeable to dialysis.   - d/w IR plans for permcath today. Start HD today   - NPO ater MN      Acute on chronic Anemia  Anemia of chronic kidney disease  Iron def anemia   - hgb baseline 8-9 per EMR.   - hgb 8.9 on arrival  - was down to 6.8 --> received 1unit PRBC  - iron low --> Venofer.   - FOB +   - IV PPI BID.   - GI consult.   - no overt bleeding seen. Would hold off on endosopy unless overt bleeding or drop in H/H   - can consider outpt once pulm status improved.      Elevated troponin  - trending down   - likely supple demand ischemia from hypoxia infection      Bradycardia  - HR around 50, lowest 45  - no BB, CCB or clonidine, continue to monitor on Tele     DM2, worse from steroids.   - A1c 6.6  - tresiba 202units at bedtime. Novolog 8 units TID.   - high dose CF     HTN  HLD  - continue home meds, except losartan    Other medical problems  H/o SDH  H/o A.fib     >55min spent, >50% spent counseling and coordinating care in the form of educating pt/family and d/w consultants and staff. Most of the time spent discussing the above plan.        Plan of care discussed with patient or family at bedside.    Bria Montanez MD  Hospitalist          Supplementary Documentation:     Quality:  DVT Prophylaxis: heparin   CODE status: DNR  Dispo: per clinical course           Estimated date of discharge: TBD  Discharge is dependent on: clinical stability  At this point Mr. Bedoya is expected to be discharge to: TBD

## 2024-04-07 NOTE — PLAN OF CARE
Problem: Diabetes/Glucose Control  Goal: Glucose maintained within prescribed range  Description: INTERVENTIONS:  - Monitor Blood Glucose as ordered  - Assess for signs and symptoms of hyperglycemia and hypoglycemia  - Administer ordered medications to maintain glucose within target range  - Assess barriers to adequate nutritional intake and initiate nutrition consult as needed  - Instruct patient on self management of diabetes  Outcome: Progressing     Problem: RESPIRATORY - ADULT  Goal: Achieves optimal ventilation and oxygenation  Description: INTERVENTIONS:  - Assess for changes in respiratory status  - Assess for changes in mentation and behavior  - Position to facilitate oxygenation and minimize respiratory effort  - Oxygen supplementation based on oxygen saturation or ABGs  - Provide Smoking Cessation handout, if applicable  - Encourage broncho-pulmonary hygiene including cough, deep breathe, Incentive Spirometry  - Assess the need for suctioning and perform as needed  - Assess and instruct to report SOB or any respiratory difficulty  - Respiratory Therapy support as indicated  - Manage/alleviate anxiety  - Monitor for signs/symptoms of CO2 retention  Outcome: Progressing     Problem: SAFETY ADULT - FALL  Goal: Free from fall injury  Description: INTERVENTIONS:  - Assess pt frequently for physical needs  - Identify cognitive and physical deficits and behaviors that affect risk of falls.  - Home fall precautions as indicated by assessment.  - Educate pt/family on patient safety including physical limitations  - Instruct pt to call for assistance with activity based on assessment  - Modify environment to reduce risk of injury  - Provide assistive devices as appropriate  - Consider OT/PT consult to assist with strengthening/mobility  - Encourage toileting schedule  Outcome: Progressing     Problem: GENITOURINARY - ADULT  Goal: Absence of urinary retention  Description: INTERVENTIONS:  - Assess patient’s  ability to void and empty bladder  - Monitor intake/output and perform bladder scan as needed  - Follow urinary retention protocol/standard of care  - Consider collaborating with pharmacy to review patient's medication profile  - Implement strategies to promote bladder emptying  Outcome: Progressing     Problem: NEUROLOGICAL - ADULT  Goal: Achieves stable or improved neurological status  Description: INTERVENTIONS  - Assess for and report changes in neurological status  - Initiate measures to prevent increased intracranial pressure  - Maintain blood pressure and fluid volume within ordered parameters to optimize cerebral perfusion and minimize risk of hemorrhage  - Monitor temperature, glucose, and sodium. Initiate appropriate interventions as ordered  Outcome: Progressing  Goal: Absence of seizures  Description: INTERVENTIONS  - Monitor for seizure activity  - Administer anti-seizure medications as ordered  - Monitor neurological status  Outcome: Progressing     Problem: Patient/Family Goals  Goal: Patient/Family Long Term Goal  Description: Patient's Long Term Goal: To discharge from hospital     Interventions:  - Monitor vital signs   - Monitor appropriate labs   - Monitor blood glucose levels   - Pain management   - Administer medications per order   - Follow MD orders   - Diagnostics per order   - Update/inform patient and family on plan of care   - Discharge planning    - See additional Care Plan goals for specific interventions  Outcome: Progressing  Goal: Patient/Family Short Term Goal  Description: Patient's Short Term Goal: To have permacath placement for dialysis     Interventions:   - Monitor vitals  - Monitor appropriate labs  - Administer medications as ordered  - Follow MD's orders  - Update patient on plan of care     - See additional Care Plan goals for specific interventions  Outcome: Progressing     Problem: METABOLIC/FLUID AND ELECTROLYTES - ADULT  Goal: Electrolytes maintained within normal  limits  Description: INTERVENTIONS:  - Monitor labs and rhythm and assess patient for signs and symptoms of electrolyte imbalances  - Administer electrolyte replacement as ordered  - Monitor response to electrolyte replacements, including rhythm and repeat lab results as appropriate  - Fluid restriction as ordered  - Instruct patient on fluid and nutrition restrictions as appropriate  Outcome: Progressing  Goal: Hemodynamic stability and optimal renal function maintained  Description: INTERVENTIONS:  - Monitor labs and assess for signs and symptoms of volume excess or deficit  - Monitor intake, output and patient weight  - Monitor urine specific gravity, serum osmolarity and serum sodium as indicated or ordered  - Monitor response to interventions for patient's volume status, including labs, urine output, blood pressure (other measures as available)  - Encourage oral intake as appropriate  - Instruct patient on fluid and nutrition restrictions as appropriate  Outcome: Progressing     Problem: PAIN - ADULT  Goal: Verbalizes/displays adequate comfort level or patient's stated pain goal  Description: INTERVENTIONS:  - Encourage pt to monitor pain and request assistance  - Assess pain using appropriate pain scale  - Administer analgesics based on type and severity of pain and evaluate response  - Implement non-pharmacological measures as appropriate and evaluate response  - Consider cultural and social influences on pain and pain management  - Manage/alleviate anxiety  - Utilize distraction and/or relaxation techniques  - Monitor for opioid side effects  - Notify MD/LIP if interventions unsuccessful or patient reports new pain  - Anticipate increased pain with activity and pre-medicate as appropriate  Outcome: Progressing       Monitoring vital signs, stable at this time. No acute changes at this moment. NPO at this time. Monitoring blood glucose levels. Fall precautions in place- bed alarm on, bed locked in lowest  position, call light within reach. Frequent rounding by nursing staff.

## 2024-04-07 NOTE — PROGRESS NOTES
[facility]  part of [facility]     Progress Note    Ramón Bedoya Patient Status:  Inpatient    3/21/1950 MRN [id number]   Location [facility]5W Attending Aníbal Najera MD   Hosp Day # 5 PCP LEONEL CORTES       Subjective:   Patient seen and examined.  Resting in bed.  Overall improvement dyspnea.      Objective:   Blood pressure 123/54, pulse 67, temperature 97.3 °F (36.3 °C), temperature source Oral, resp. rate 12, weight 197 lb 14.4 oz (89.8 kg), SpO2 92%.  Intake/Output:   Last 3 shifts: I/O last 3 completed shifts:  In: 1458 [P.O.:1348; I.V.:10; IV PIGGYBACK:100]  Out: 800 [Urine:800]   This shift: No intake/output data recorded.     Vent Settings:      Hemodynamic parameters (last 24 hours):      Scheduled Meds:         Continuous Infusions:         Physical Exam  Constitutional: no acute distress  Eyes: PERRL  ENT: nares pateint  Neck: supple, no JVD  Cardio: RRR, S1 S2  Respiratory: Scattered Rales  GI: abdomen soft, non tender, active bowel sounds, no organomegaly  Extremities: no clubbing, cyanosis, edema  Neurologic: no gross motor deficits  Skin: warm, dry      Results:     Lab Results   Component Value Date    WBC 9.4 2024    HGB 7.6 2024    HCT 22.1 2024    .0 2024    CREATSERUM 7.65 2024     2024     2024    K 4.8 2024     2024    CO2 16.0 2024     2024    CA 7.7 2024    ALB 3.4 2024    ALKPHO 69 2024    BILT 0.3 2024    TP 6.1 2024    AST 21 2024    ALT 30 2024       No results found.          Assessment   1.  Acute hypoxemic respiratory failure  2.  Influenza A  3.  Multifocal pneumonia  4.  Acute kidney injury on chronic kidney disease  5.  Diabetes mellitus  6.  Anemia       Plan     -Patient presents with worsening dyspnea cough cold like symptoms for 3 to 4 days presentation prior to arrival.  Significant hypoxemic respiratory  failure on presentation  -Viral screen positive for influenza A  -Chest x-ray with evidence of multifocal infiltrates concerning for pneumonia.  Santosh chest x-ray on 4/5/2024 with some improvement in multifocal airspace opacities seen.  -Wean steroid therapy  -Completed course of antibiotic therapy  -Blood cultures with no growth to date  -Weaned off oxygen  -Nebulizer treatments  -Further recommendations per nephrology  -Increase activity as tolerated  -Worsening renal function noted initially did not want to pursue dialysis now agreeable.  Plan for dialysis catheter placement and initiation of renal replacement therapy.  -Patient made DNR/DNI.    Kay Mane DO  Pulmonary Critical Care Medicine  [facility]

## 2024-04-08 PROBLEM — N18.6 ESRD ON HEMODIALYSIS (HCC): Status: ACTIVE | Noted: 2024-04-08

## 2024-04-08 PROBLEM — Z99.2 ESRD ON HEMODIALYSIS (HCC): Status: ACTIVE | Noted: 2024-04-08

## 2024-04-08 LAB
ANION GAP SERPL CALC-SCNC: 11 MMOL/L (ref 0–18)
BUN BLD-MCNC: 108 MG/DL (ref 9–23)
BUN/CREAT SERPL: 17.7 (ref 10–20)
CALCIUM BLD-MCNC: 7.6 MG/DL (ref 8.7–10.4)
CHLORIDE SERPL-SCNC: 106 MMOL/L (ref 98–112)
CO2 SERPL-SCNC: 20 MMOL/L (ref 21–32)
CREAT BLD-MCNC: 6.11 MG/DL
DEPRECATED RDW RBC AUTO: 45.7 FL (ref 35.1–46.3)
EGFRCR SERPLBLD CKD-EPI 2021: 9 ML/MIN/1.73M2 (ref 60–?)
ERYTHROCYTE [DISTWIDTH] IN BLOOD BY AUTOMATED COUNT: 14.2 % (ref 11–15)
GLUCOSE BLD-MCNC: 226 MG/DL (ref 70–99)
GLUCOSE BLDC GLUCOMTR-MCNC: 215 MG/DL (ref 70–99)
GLUCOSE BLDC GLUCOMTR-MCNC: 220 MG/DL (ref 70–99)
GLUCOSE BLDC GLUCOMTR-MCNC: 246 MG/DL (ref 70–99)
GLUCOSE BLDC GLUCOMTR-MCNC: 294 MG/DL (ref 70–99)
HCT VFR BLD AUTO: 21.7 %
HGB BLD-MCNC: 7.4 G/DL
MCH RBC QN AUTO: 30.2 PG (ref 26–34)
MCHC RBC AUTO-ENTMCNC: 34.1 G/DL (ref 31–37)
MCV RBC AUTO: 88.6 FL
OSMOLALITY SERPL CALC.SUM OF ELEC: 325 MOSM/KG (ref 275–295)
PLATELET # BLD AUTO: 251 10(3)UL (ref 150–450)
POTASSIUM SERPL-SCNC: 4.8 MMOL/L (ref 3.5–5.1)
RBC # BLD AUTO: 2.45 X10(6)UL
SODIUM SERPL-SCNC: 137 MMOL/L (ref 136–145)
TSI SER-ACNC: 0.79 MIU/ML (ref 0.55–4.78)
WBC # BLD AUTO: 9 X10(3) UL (ref 4–11)

## 2024-04-08 PROCEDURE — 99233 SBSQ HOSP IP/OBS HIGH 50: CPT | Performed by: HOSPITALIST

## 2024-04-08 PROCEDURE — 99232 SBSQ HOSP IP/OBS MODERATE 35: CPT | Performed by: INTERNAL MEDICINE

## 2024-04-08 PROCEDURE — 99233 SBSQ HOSP IP/OBS HIGH 50: CPT | Performed by: INTERNAL MEDICINE

## 2024-04-08 RX ORDER — ALBUMIN (HUMAN) 12.5 G/50ML
25 SOLUTION INTRAVENOUS
Status: DISCONTINUED | OUTPATIENT
Start: 2024-04-08 | End: 2024-04-09

## 2024-04-08 NOTE — PROGRESS NOTES
[facility]  part of [facility]    Progress Note    Ramón Bedoya Patient Status:  Inpatient    3/21/1950 MRN [id number]   Location [facility]5W Attending Bria Montanez MD   Hosp Day # 6 PCP LEONEL CORTES     Chief Complaint:   Chief Complaint   Patient presents with    Body ache and/or chills    Shortness Of Breath   Wheezing     Subjective:   Ramón Bedoya  is doing well. Eating good. On RA. SOB resolved. Cough better. Dtr at bedside. Did well with dialysis session yesterday   Objective:   Objective:    Blood pressure 143/58, pulse 69, temperature 97.5 °F (36.4 °C), temperature source Oral, resp. rate 18, weight 198 lb 3.2 oz (89.9 kg), SpO2 93%.    Physical Exam:    General: No acute distress.   Respiratory: Clear to auscultation bilaterally. No wheezes. No rhonchi.  Cardiovascular: S1, S2. Regular rate and rhythm. No murmurs, rubs or gallops.   Abdomen: Soft, nontender, nondistended.  Positive bowel sounds. No rebound or guarding.  Neurologic: No focal neurological deficits.   Musculoskeletal: Moves all extremities.  Extremities: No edema.      Results:   Results:    Labs:  Recent Labs   Lab 24  1122 24  0815 24  0543 24  0515 04/06/24  0420 24  0524   WBC  --    < > 9.2 10.2 11.0 9.4 9.0   HGB  --    < > 8.3* 7.8* 8.0* 7.6* 7.4*   MCV  --    < > 88.8 87.4 89.7 88.0 88.6   PLT  --    < > 197.0 216.0 248.0 236.0 251.0   INR 0.95  --   --   --   --   --   --     < > = values in this interval not displayed.       Recent Labs   Lab 24  1313 24  0815 24  0543 24  0515 24  0420 24  0511 24   GLU  --    < > 215* 230* 155* 142* 226*   BUN  --    < > 113* 127* 142* 150* 108*   CREATSERUM  --    < > 6.09* 6.43* 7.04* 7.65* 6.11*   CA  --    < > 7.6* 7.5* 7.4* 7.7* 7.6*   ALB 3.4   < > 3.6  3.6 3.4 3.4 3.4  --    NA  --    < > 135* 136 137 135* 137   K  --    < > 4.9 4.8 4.8 4.8 4.8   CL  --    < > 106  107 108 106 106   CO2  --    < > 17.0* 16.0* 17.0* 16.0* 20.0*   ALKPHO 77  --  81  --   --  69  --    AST 55*  --  39*  --   --  21  --    ALT 50*  --  41  --   --  30  --    BILT 0.5  --  0.5  --   --  0.3  --    TP 6.3  --  6.5  --   --  6.1  --     < > = values in this interval not displayed.       Estimated Creatinine Clearance: 8.4 mL/min (A) (based on SCr of 6.11 mg/dL (H)).    Recent Labs   Lab 04/02/24  1122   PTP 13.2   INR 0.95            Culture:  Hospital Encounter on 04/02/24   1. Blood Culture     Status: None    Collection Time: 04/02/24 11:41 AM    Specimen: Blood,peripheral   Result Value Ref Range    Blood Culture Result No Growth 5 Days N/A       Cardiac  No results for input(s): \"TROP\", \"PBNP\" in the last 168 hours.      Imaging: Imaging data reviewed in Epic.  No results found.    Medications:    epoetin manasa  5,000 Units Subcutaneous Once per day on Monday Wednesday Friday    albuterol  2.5 mg Nebulization TID    OLANZapine  2.5 mg Oral Nightly    gabapentin  100 mg Oral Nightly    pantoprazole  40 mg Intravenous Q12H    insulin degludec  22 Units Subcutaneous Nightly    insulin aspart  8 Units Subcutaneous TID CC    insulin aspart  1-11 Units Subcutaneous TID CC and HS    vancomycin  125 mg Oral Daily    amiodarone  200 mg Oral Daily    amLODIPine  10 mg Oral Daily    atorvastatin  10 mg Oral Daily    calcitriol  0.25 mcg Oral Q MWF    hydrALAZINE  25 mg Oral TID    sodium bicarbonate  650 mg Oral BID    levETIRAcetam  500 mg Intravenous Q12H         Assessment and Plan:   Assessment & Plan:        Multifocal pneumonia   Influenza A infection  Acute hypoxemic respiratory failure   - Improving   - Influenza A positive on arrival, hold Tamiflu since out of window.   - CXR multifocal PNA with improvement   - prednisone discontinued.   - Completed Rocephin and azithromycin.   - on RA now.   - Pulm on consult.   - blood cx x 2 NGTD.   - IS/Flutter valve.      Acute kidney injury on CKD  4  Metabolic acidosis  Hyponatremia  Hypertensive nephropathy  Diabetic nephropathy  - baseline Cr around 3-3.5,  - BUN/Creat trending up.   - Renal following  - s/p R internal jugular tunneled catheter 4/7   - Will repeat session of dialysis today and tomorrow.   - SW to set up HD for outpt      Acute on chronic Anemia  Anemia of chronic kidney disease  Iron def anemia   - hgb baseline 8-9 per EMR.   - hgb 8.9 on arrival  - was down to 6.8 --> received 1unit PRBC  - iron low --> Venofer.   - FOB +   - IV PPI BID.   - GI consult.   - no overt bleeding seen. Would hold off on endosopy unless overt bleeding or drop in H/H   - can consider outpt once pulm status improved.      Elevated troponin  - trending down   - likely supple demand ischemia from hypoxia infection      Bradycardia  - HR around 50, lowest 45  - no BB, CCB or clonidine, continue to monitor on Tele     DM2, worse from steroids.   - A1c 6.6  - tresiba 22units at bedtime. Novolog 8 units TID.   - high dose CF  - Monitor BG while off steroids now.      HTN  HLD  - continue home meds, except losartan    Other medical problems  H/o SDH  H/o A.fib     Dispo: Possible home tomorrow after dialysis if outpt HD set up.     >55min spent, >50% spent counseling and coordinating care in the form of educating pt/family and d/w consultants and staff. Most of the time spent discussing the above plan.        Plan of care discussed with patient or family at bedside.    Bria Montanez MD  Hospitalist          Supplementary Documentation:     Quality:  DVT Prophylaxis: heparin   CODE status: DNR  Dispo: per clinical course           Estimated date of discharge: TBD  Discharge is dependent on: clinical stability  At this point Mr. Bedoya is expected to be discharge to: TBD

## 2024-04-08 NOTE — PROGRESS NOTES
[facility]  Nephrology Daily Progress Note    Ramón Bedoya  [id number]  74 year old      HPI:   Ramón Bedoya is a 74 year old male.  Up in chair feeling OK.  Eating and on RA. Had first HD rx yesterday and tolerated well.       ROS:     Constitutional:  Negative for decreased activity, fever, irritability and lethargy  Endocrine:  Negative for abnormal sleep patterns, increased activity, polydipsia and polyphagia  Cardiovascular:  Negative for cool extremity and irregular heartbeat/palpitations  Gastrointestinal:  Negative for abdominal pain, constipation, decreased appetite, diarrhea and vomiting  Genitourinary:  Negative for dysuria and hematuria  Hema/Lymph:  Negative for easy bleeding and easy bruising  Integumentary:  Negative for pruritus and rash  Musculoskeletal:  Negative for bone/joint symptoms  Neurological:  Negative for gait disturbance  Psychiatric:  Negative for inappropriate interaction and psychiatric symptoms  Respiratory:  Negative for cough, dyspnea and wheezing      PHYSICAL EXAM:   Temp:  [97.5 °F (36.4 °C)-97.9 °F (36.6 °C)] 97.5 °F (36.4 °C)  Pulse:  [56-74] 69  Resp:  [16-18] 18  BP: (120-150)/(48-87) 143/58  SpO2:  [92 %-94 %] 93 %  Patient Weight for the past 72 hrs:   Weight   04/06/24 0504 192 lb 8 oz (87.3 kg)   04/07/24 0448 197 lb 14.4 oz (89.8 kg)   04/08/24 0539 198 lb 3.2 oz (89.9 kg)       Constitutional: appears well hydrated alert and responsive no acute distress noted  Neck/Thyroid: neck is supple without adenopathy  Lymphatic: no abnormal cervical, supraclavicular or axillary adenopathy is noted  Respiratory: normal to inspection lungs are clear to auscultation bilaterally normal respiratory effort  Cardiovascular: regular rate and rhythm no murmurs, gallups, or rubs  Abdomen: soft non-tender non-distended no organomegaly noted no masses  Musculoskeletal: full ROM all extremities good strength  no deformities  Extremities: 1 + edema.    Neurological: exam  appropriate for age reflexes and motor skills appropriate for age    Labs:  Lab Results   Component Value Date    WBC 9.0 04/08/2024    HGB 7.4 04/08/2024    HCT 21.7 04/08/2024    .0 04/08/2024    CREATSERUM 6.11 04/08/2024     04/08/2024     04/08/2024    K 4.8 04/08/2024     04/08/2024    CO2 20.0 04/08/2024     04/08/2024    CA 7.6 04/08/2024     Recent Labs   Lab 04/06/24  0420 04/07/24  0511 04/08/24 0524   WBC 11.0 9.4 9.0   HGB 8.0* 7.6* 7.4*   HCT 23.6* 22.1* 21.7*   .0 236.0 251.0     Recent Labs   Lab 04/02/24  1313 04/03/24  0815 04/04/24  0543 04/05/24  0515 04/06/24  0420 04/07/24  0511 04/08/24 0524   GLU  --    < > 215* 230* 155* 142* 226*   BUN  --    < > 113* 127* 142* 150* 108*   CREATSERUM  --    < > 6.09* 6.43* 7.04* 7.65* 6.11*   CA  --    < > 7.6* 7.5* 7.4* 7.7* 7.6*   ALB 3.4   < > 3.6  3.6 3.4 3.4 3.4  --    NA  --    < > 135* 136 137 135* 137   K  --    < > 4.9 4.8 4.8 4.8 4.8   CL  --    < > 106 107 108 106 106   CO2  --    < > 17.0* 16.0* 17.0* 16.0* 20.0*   ALKPHO 77  --  81  --   --  69  --    AST 55*  --  39*  --   --  21  --    ALT 50*  --  41  --   --  30  --    BILT 0.5  --  0.5  --   --  0.3  --    TP 6.3  --  6.5  --   --  6.1  --    PHOS  --    < > 7.3* 7.3* 8.3*  --   --     < > = values in this interval not displayed.       Imaging  No results found.      Medications:    Current Facility-Administered Medications:     sodium chloride 0.9 % IV bolus 100 mL, 100 mL, Intravenous, Q30 Min PRN **AND** albumin human (Albumin) 25% injection 25 g, 25 g, Intravenous, PRN Dialysis    epoetin manasa (Epogen, Procrit) 5,000 Units injection, 5,000 Units, Subcutaneous, Once per day on Monday Wednesday Friday    albuterol (Ventolin) (2.5 MG/3ML) 0.083% nebulizer solution 2.5 mg, 2.5 mg, Nebulization, TID    OLANZapine (ZyPREXA) tab 2.5 mg, 2.5 mg, Oral, Nightly    gabapentin (Neurontin) cap 100 mg, 100 mg, Oral, Nightly    pantoprazole (Protonix) 40 mg  in sodium chloride 0.9% PF 10 mL IV push, 40 mg, Intravenous, Q12H    insulin degludec 100 units/mL flextouch 22 Units, 22 Units, Subcutaneous, Nightly    insulin aspart (NovoLOG) 100 Units/mL FlexPen 8 Units, 8 Units, Subcutaneous, TID CC    ipratropium-albuterol (Duoneb) 0.5-2.5 (3) MG/3ML inhalation solution 3 mL, 3 mL, Nebulization, Q6H PRN    glucose (Dex4) 15 GM/59ML oral liquid 15 g, 15 g, Oral, Q15 Min PRN **OR** glucose (Glutose) 40% oral gel 15 g, 15 g, Oral, Q15 Min PRN **OR** glucose-vitamin C (Dex-4) chewable tab 4 tablet, 4 tablet, Oral, Q15 Min PRN **OR** dextrose 50% injection 50 mL, 50 mL, Intravenous, Q15 Min PRN **OR** glucose (Dex4) 15 GM/59ML oral liquid 30 g, 30 g, Oral, Q15 Min PRN **OR** glucose (Glutose) 40% oral gel 30 g, 30 g, Oral, Q15 Min PRN **OR** glucose-vitamin C (Dex-4) chewable tab 8 tablet, 8 tablet, Oral, Q15 Min PRN    insulin aspart (NovoLOG) 100 Units/mL FlexPen 1-11 Units, 1-11 Units, Subcutaneous, TID CC and HS    acetaminophen (Tylenol Extra Strength) tab 500 mg, 500 mg, Oral, Q4H PRN    ondansetron (Zofran) 4 MG/2ML injection 4 mg, 4 mg, Intravenous, Q6H PRN    temazepam (Restoril) cap 15 mg, 15 mg, Oral, Nightly PRN    guaiFENesin (Robitussin) 100 MG/5 ML oral liquid 200 mg, 200 mg, Oral, Q4H PRN    benzonatate (Tessalon) cap 200 mg, 200 mg, Oral, TID PRN    vancomycin (Vancocin) cap 125 mg, 125 mg, Oral, Daily    amiodarone (Pacerone) tab 200 mg, 200 mg, Oral, Daily    amLODIPine (Norvasc) tab 10 mg, 10 mg, Oral, Daily    atorvastatin (Lipitor) tab 10 mg, 10 mg, Oral, Daily    calcitriol (Rocaltrol) cap 0.25 mcg, 0.25 mcg, Oral, Q MWF    hydrALAZINE (Apresoline) tab 25 mg, 25 mg, Oral, TID    sodium bicarbonate tab 650 mg, 650 mg, Oral, BID    levETIRAcetam (Keppra) 500 mg/5mL injection 500 mg, 500 mg, Intravenous, Q12H    Allergies:  Allergies   Allergen Reactions    Penicillins UNKNOWN       Input/Output:    Intake/Output Summary (Last 24 hours) at 4/8/2024 1443  Last  data filed at 4/8/2024 1013  Gross per 24 hour   Intake 400 ml   Output 1500 ml   Net -1100 ml          ASSESSMENT/PLAN:   Assessment   Patient Active Problem List   Diagnosis    Chronic subdural hematoma (HCC)    CKD (chronic kidney disease) stage 4, GFR 15-29 ml/min (HCC)    Anemia in stage 4 chronic kidney disease (HCC)    Hyponatremia    Anemia    Acute renal failure (ARF) (HCC)    Acute kidney injury (HCC)    Metabolic acidosis    Respiratory alkalosis    Hyperglycemia    Dyspnea, unspecified type    Influenzal bronchitis    Palliative care by specialist    Stage 5 chronic kidney disease not on chronic dialysis (HCC)       Pt awaiting second HD for later today.  Continue mild fluid removal.  Soc worker making arrangements for out pt HD rx.  Hb 7,4.  On Epo and venofer.  Labs in am. Discussed with daughter by phone.     Addendum.  Contacted by .  He will be set up for outpatient hemodialysis on a Tuesday, Thursday, Saturday schedule.  Therefore we will arrange for hemodialysis tomorrow to get him on his regular schedule.  Possible discharge tomorrow if everything is set up and clinically doing well.         4/8/2024  Bryce Hirsch MD

## 2024-04-08 NOTE — CM/SW NOTE
JULIO spoke to Kinsey from [facility] admissions.    Per Kinsey, pt will receive clearance after flowsheets uploaded to portal.    JULIO spoke to pt and daughter Sherita miller.  Pt's daughter Yadi on the phone.    SW answered questions regarding Dialysis schedules, time of treatment, emergencies, and importance of compliance with HD.  Choice clinic is [facility].  SW reserved in Aidin.    Pt confirmed with [facility] [place].  SW to confirm PT is included in face to face.    Family interested in [facility].  JULIO sent secure chat for Dr. Montanez to order CPC.  All questions addressed.    JULIO sent HCPOA copy down to registration 210 for placement in EMR.    SW explained process to have care team changed to [facility] practitioners.  PCP referral line provided in AVS.    Community Palliative referral sent in Aidin.    Update 3:30 PM    JULIO spoke to Kinsey from [facility] admissions.  Per Kinsey, [facility] only has a M/W/F at 5:50 AM.  Per family, this is too early.  Tentative HD time is Tues/Thurs/Sat at 10:20 AM.  Per Kinsey a current pt is moving away in May and another early MWF chair time may open up.    Dr. Hirsch and Dr. Montanez aware of above.  Plan for HD tomorrow (first outpatient HD Thursday 4/11).    SW faxed HD flowsheet to [facility] referral.  2nd flowsheet to be faxed tomorrow.    PLAN: [place] home w/[facility], [facility]idential [facility], outpatient HD @ [facility] TTS @10:20 AM    / to remain available for support and/or discharge planning.     Jessa Pruitt MSW, LSW [id number]

## 2024-04-08 NOTE — PLAN OF CARE
Problem: Diabetes/Glucose Control  Goal: Glucose maintained within prescribed range  Description: INTERVENTIONS:  - Monitor Blood Glucose as ordered  - Assess for signs and symptoms of hyperglycemia and hypoglycemia  - Administer ordered medications to maintain glucose within target range  - Assess barriers to adequate nutritional intake and initiate nutrition consult as needed  - Instruct patient on self management of diabetes  Outcome: Progressing     Problem: RESPIRATORY - ADULT  Goal: Achieves optimal ventilation and oxygenation  Description: INTERVENTIONS:  - Assess for changes in respiratory status  - Assess for changes in mentation and behavior  - Position to facilitate oxygenation and minimize respiratory effort  - Oxygen supplementation based on oxygen saturation or ABGs  - Provide Smoking Cessation handout, if applicable  - Encourage broncho-pulmonary hygiene including cough, deep breathe, Incentive Spirometry  - Assess the need for suctioning and perform as needed  - Assess and instruct to report SOB or any respiratory difficulty  - Respiratory Therapy support as indicated  - Manage/alleviate anxiety  - Monitor for signs/symptoms of CO2 retention  Outcome: Progressing     Problem: SAFETY ADULT - FALL  Goal: Free from fall injury  Description: INTERVENTIONS:  - Assess pt frequently for physical needs  - Identify cognitive and physical deficits and behaviors that affect risk of falls.  - Udall fall precautions as indicated by assessment.  - Educate pt/family on patient safety including physical limitations  - Instruct pt to call for assistance with activity based on assessment  - Modify environment to reduce risk of injury  - Provide assistive devices as appropriate  - Consider OT/PT consult to assist with strengthening/mobility  - Encourage toileting schedule  Outcome: Progressing     Problem: GENITOURINARY - ADULT  Goal: Absence of urinary retention  Description: INTERVENTIONS:  - Assess patient’s  ability to void and empty bladder  - Monitor intake/output and perform bladder scan as needed  - Follow urinary retention protocol/standard of care  - Consider collaborating with pharmacy to review patient's medication profile  - Implement strategies to promote bladder emptying  Outcome: Progressing     Problem: NEUROLOGICAL - ADULT  Goal: Achieves stable or improved neurological status  Description: INTERVENTIONS  - Assess for and report changes in neurological status  - Initiate measures to prevent increased intracranial pressure  - Maintain blood pressure and fluid volume within ordered parameters to optimize cerebral perfusion and minimize risk of hemorrhage  - Monitor temperature, glucose, and sodium. Initiate appropriate interventions as ordered  Outcome: Progressing  Goal: Absence of seizures  Description: INTERVENTIONS  - Monitor for seizure activity  - Administer anti-seizure medications as ordered  - Monitor neurological status  Outcome: Progressing     Problem: Patient/Family Goals  Goal: Patient/Family Long Term Goal  Description: Patient's Long Term Goal: To discharge from hospital     Interventions:  - Monitor vital signs   - Monitor appropriate labs   - Monitor blood glucose levels   - Pain management   - Administer medications per order   - Follow MD orders   - Diagnostics per order   - Update/inform patient and family on plan of care   - Discharge planning    - See additional Care Plan goals for specific interventions  Outcome: Progressing  Goal: Patient/Family Short Term Goal  Description: Patient's Short Term Goal: To have permacath placement for dialysis     Interventions:   - Monitor vitals  - Monitor appropriate labs  - Administer medications as ordered  - Follow MD's orders  - Update patient on plan of care     - See additional Care Plan goals for specific interventions  Outcome: Progressing     Problem: METABOLIC/FLUID AND ELECTROLYTES - ADULT  Goal: Electrolytes maintained within normal  limits  Description: INTERVENTIONS:  - Monitor labs and rhythm and assess patient for signs and symptoms of electrolyte imbalances  - Administer electrolyte replacement as ordered  - Monitor response to electrolyte replacements, including rhythm and repeat lab results as appropriate  - Fluid restriction as ordered  - Instruct patient on fluid and nutrition restrictions as appropriate  Outcome: Progressing  Goal: Hemodynamic stability and optimal renal function maintained  Description: INTERVENTIONS:  - Monitor labs and assess for signs and symptoms of volume excess or deficit  - Monitor intake, output and patient weight  - Monitor urine specific gravity, serum osmolarity and serum sodium as indicated or ordered  - Monitor response to interventions for patient's volume status, including labs, urine output, blood pressure (other measures as available)  - Encourage oral intake as appropriate  - Instruct patient on fluid and nutrition restrictions as appropriate  Outcome: Progressing     Problem: PAIN - ADULT  Goal: Verbalizes/displays adequate comfort level or patient's stated pain goal  Description: INTERVENTIONS:  - Encourage pt to monitor pain and request assistance  - Assess pain using appropriate pain scale  - Administer analgesics based on type and severity of pain and evaluate response  - Implement non-pharmacological measures as appropriate and evaluate response  - Consider cultural and social influences on pain and pain management  - Manage/alleviate anxiety  - Utilize distraction and/or relaxation techniques  - Monitor for opioid side effects  - Notify MD/LIP if interventions unsuccessful or patient reports new pain  - Anticipate increased pain with activity and pre-medicate as appropriate  Outcome: Progressing       Monitoring vital signs, stable at this time. No acute changes at this moment. Monitoring blood glucose levels. Fall precautions in place- bed alarm on, bed locked in lowest position, call light  within reach. Frequent rounding by nursing staff.

## 2024-04-08 NOTE — PROGRESS NOTES
[facility]  part of [facility]    Progress Note      Assessment and Plan:     1.  Multifocal pneumonia with influenza A-the patient is improved from a respiratory perspective.  I do not think he needs a steroid any longer.  Lifetime non-smoker.    Recommendations:  1.  Discontinue prednisone  2.  Ongoing nebulizer therapy as needed  3.  Off antibiotic    2.  Diabetes mellitus-as above we will discontinue steroid    3.  DVT prophylaxis-SCDs in patient with recent severe anemia necessitating blood.  Fecal occult positive.    4.  Troponin positivity-as per cardiology.    5.  LG-as per nephrology, hemodialysis.    6.  Chronic anemia-to follow on PPI.      Subjective:   Ramón Bedoya is a(n) 74 year old male who is breathing more comfortably    Objective:   Blood pressure 143/58, pulse 69, temperature 97.5 °F (36.4 °C), temperature source Oral, resp. rate 18, weight 198 lb 3.2 oz (89.9 kg), SpO2 93%.    Physical Exam alert white male  HEENT examination is unremarkable with pupils equal round and reactive to light and accommodation.   Neck without adenopathy, thyromegaly, JVD nor bruit.   Lungs diminished to auscultation and percussion.  Cardiac regular rate and rhythm no murmur.   Abdomen nontender, without hepatosplenomegaly and no mass appreciable.   Extremities without clubbing cyanosis nor edema.   Neurologic grossly intact with symmetric tone and strength and reflex.  Skin without gross abnormality     Results:     Lab Results   Component Value Date    WBC 9.0 04/08/2024    HGB 7.4 04/08/2024    HCT 21.7 04/08/2024    .0 04/08/2024    CREATSERUM 6.11 04/08/2024     04/08/2024     04/08/2024    K 4.8 04/08/2024     04/08/2024    CO2 20.0 04/08/2024     04/08/2024    CA 7.6 04/08/2024     Chest x-ray-subtle multifocal opacities from 3 days ago    Nic Ludwig MD  Medical Director, Critical Care, [facility]  Medical Director, [facility]  Pager:  [phone]

## 2024-04-09 VITALS
RESPIRATION RATE: 20 BRPM | TEMPERATURE: 98 F | DIASTOLIC BLOOD PRESSURE: 61 MMHG | OXYGEN SATURATION: 95 % | WEIGHT: 198.19 LBS | SYSTOLIC BLOOD PRESSURE: 124 MMHG | BODY MASS INDEX: 35 KG/M2 | HEART RATE: 69 BPM

## 2024-04-09 LAB
ALBUMIN SERPL-MCNC: 3.4 G/DL (ref 3.2–4.8)
ANION GAP SERPL CALC-SCNC: 8 MMOL/L (ref 0–18)
BASOPHILS # BLD: 0 X10(3) UL (ref 0–0.2)
BASOPHILS NFR BLD: 0 %
BUN BLD-MCNC: 56 MG/DL (ref 9–23)
BUN/CREAT SERPL: 16.3 (ref 10–20)
CALCIUM BLD-MCNC: 7.9 MG/DL (ref 8.7–10.4)
CHLORIDE SERPL-SCNC: 105 MMOL/L (ref 98–112)
CO2 SERPL-SCNC: 25 MMOL/L (ref 21–32)
CREAT BLD-MCNC: 3.43 MG/DL
DEPRECATED RDW RBC AUTO: 44.2 FL (ref 35.1–46.3)
EGFRCR SERPLBLD CKD-EPI 2021: 18 ML/MIN/1.73M2 (ref 60–?)
EOSINOPHIL # BLD: 0 X10(3) UL (ref 0–0.7)
EOSINOPHIL NFR BLD: 0 %
ERYTHROCYTE [DISTWIDTH] IN BLOOD BY AUTOMATED COUNT: 14.1 % (ref 11–15)
GLUCOSE BLD-MCNC: 103 MG/DL (ref 70–99)
GLUCOSE BLDC GLUCOMTR-MCNC: 75 MG/DL (ref 70–99)
GLUCOSE BLDC GLUCOMTR-MCNC: 83 MG/DL (ref 70–99)
HCT VFR BLD AUTO: 21.9 %
HGB BLD-MCNC: 7.7 G/DL
LYMPHOCYTES NFR BLD: 0.38 X10(3) UL (ref 1–4)
LYMPHOCYTES NFR BLD: 3 %
MAGNESIUM SERPL-MCNC: 2 MG/DL (ref 1.6–2.6)
MCH RBC QN AUTO: 30.6 PG (ref 26–34)
MCHC RBC AUTO-ENTMCNC: 35.2 G/DL (ref 31–37)
MCV RBC AUTO: 86.9 FL
METAMYELOCYTES # BLD: 0.13 X10(3) UL
METAMYELOCYTES NFR BLD: 1 %
MONOCYTES # BLD: 0.5 X10(3) UL (ref 0.1–1)
MONOCYTES NFR BLD: 4 %
MORPHOLOGY: NORMAL
MYELOCYTES # BLD: 0.13 X10(3) UL
MYELOCYTES NFR BLD: 1 %
NEUTROPHILS # BLD AUTO: 9.88 X10 (3) UL (ref 1.5–7.7)
NEUTROPHILS NFR BLD: 89 %
NEUTS BAND NFR BLD: 2 %
NEUTS HYPERSEG # BLD: 11.38 X10(3) UL (ref 1.5–7.7)
OSMOLALITY SERPL CALC.SUM OF ELEC: 302 MOSM/KG (ref 275–295)
PHOSPHATE SERPL-MCNC: 4.2 MG/DL (ref 2.4–5.1)
PLATELET # BLD AUTO: 264 10(3)UL (ref 150–450)
PLATELET MORPHOLOGY: NORMAL
POTASSIUM SERPL-SCNC: 3.8 MMOL/L (ref 3.5–5.1)
RBC # BLD AUTO: 2.52 X10(6)UL
SODIUM SERPL-SCNC: 138 MMOL/L (ref 136–145)
TOTAL CELLS COUNTED BLD: 100
WBC # BLD AUTO: 12.5 X10(3) UL (ref 4–11)

## 2024-04-09 PROCEDURE — 99232 SBSQ HOSP IP/OBS MODERATE 35: CPT | Performed by: INTERNAL MEDICINE

## 2024-04-09 PROCEDURE — 99239 HOSP IP/OBS DSCHRG MGMT >30: CPT | Performed by: HOSPITALIST

## 2024-04-09 PROCEDURE — 99233 SBSQ HOSP IP/OBS HIGH 50: CPT | Performed by: INTERNAL MEDICINE

## 2024-04-09 RX ORDER — HEPARIN SODIUM (PORCINE) LOCK FLUSH IV SOLN 100 UNIT/ML 100 UNIT/ML
3.2 SOLUTION INTRAVENOUS ONCE
Status: DISCONTINUED | OUTPATIENT
Start: 2024-04-09 | End: 2024-04-09

## 2024-04-09 RX ORDER — HEPARIN SODIUM 1000 [USP'U]/ML
INJECTION, SOLUTION INTRAVENOUS; SUBCUTANEOUS
Status: COMPLETED
Start: 2024-04-09 | End: 2024-04-09

## 2024-04-09 RX ORDER — HEPARIN SODIUM 1000 [USP'U]/ML
1500 INJECTION, SOLUTION INTRAVENOUS; SUBCUTANEOUS ONCE
Status: COMPLETED | OUTPATIENT
Start: 2024-04-09 | End: 2024-04-09

## 2024-04-09 NOTE — DISCHARGE PLANNING
Discharge order in per MD. Discharge instructions given verbally and in writing to patient and family. Patient educated on important follows up and adherence to dialysis schedule. Dialysis catheter remains clamped and intact. PIVs removed. Patient and family have no questions at this time. Patient discharged home via family.
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
## 2024-04-09 NOTE — PROGRESS NOTES
[facility]  part of [facility]     Progress Note    Ramón Bedoya Patient Status:  Inpatient    3/21/1950 MRN [id number]   Location [facility]5W Attending Aníbal Najera MD   Hosp Day # 7 PCP LEONEL CORTES       Subjective:   Patient seen and examined.  Resting in bed.  Overall improvement dyspnea.      Objective:   Blood pressure 143/61, pulse 80, temperature 98.2 °F (36.8 °C), temperature source Oral, resp. rate 20, weight 198 lb 3.2 oz (89.9 kg), SpO2 98%.  Intake/Output:   Last 3 shifts: I/O last 3 completed shifts:  In: 1238 [P.O.:1228; I.V.:10]  Out: 3550 [Urine:550; Other:3000]   This shift: I/O this shift:  In: 236 [P.O.:236]  Out:  [Other:]     Vent Settings:      Hemodynamic parameters (last 24 hours):      Scheduled Meds:         Continuous Infusions:         Physical Exam  Constitutional: no acute distress  Eyes: PERRL  ENT: nares pateint  Neck: supple, no JVD  Cardio: RRR, S1 S2  Respiratory: Scattered Rales  GI: abdomen soft, non tender, active bowel sounds, no organomegaly  Extremities: no clubbing, cyanosis, edema  Neurologic: no gross motor deficits  Skin: warm, dry      Results:     Lab Results   Component Value Date    WBC 12.5 2024    HGB 7.7 2024    HCT 21.9 2024    .0 2024    CREATSERUM 3.43 2024    BUN 56 2024     2024    K 3.8 2024     2024    CO2 25.0 2024     2024    CA 7.9 2024    ALB 3.4 2024    MG 2.0 2024    PHOS 4.2 2024       No results found.          Assessment   1.  Acute hypoxemic respiratory failure  2.  Influenza A  3.  Multifocal pneumonia  4.  Acute kidney injury on chronic kidney disease  5.  Diabetes mellitus  6.  Anemia       Plan     -Patient presents with worsening dyspnea cough cold like symptoms for 3 to 4 days presentation prior to arrival.  Significant hypoxemic respiratory failure on presentation  -Viral screen  positive for influenza A  -Chest x-ray with evidence of multifocal infiltrates concerning for pneumonia.  [facility] chest x-ray on 4/5/2024 with some improvement in multifocal airspace opacities seen.  -Wean steroid therapy  -Completed course of antibiotic therapy  -Blood cultures with no growth to date  -Weaned off oxygen  -Nebulizer treatments  -Further recommendations per nephrology  -Increase activity as tolerated  -Renal replacement therapy per nephrology recommendations  -Patient made DNR/DNI.  -Okay for discharge from pulmonary perspective    Kay Mane DO  Pulmonary Critical Care Medicine  [facility]

## 2024-04-09 NOTE — PALLIATIVE CARE NOTE
Brief Palliative Care Note    Please see full consult/progress note to follow.     ZACK (Daughter Yadi) is requesting that she be the one to be updated on discharge/logistical plans to streamline communication between the family, instead of her siblings listed. She is greatly appreciative of our efforts for her dad.         Yong Cannon MD  Palliative Care Services  [facility] [phone]

## 2024-04-09 NOTE — PROGRESS NOTES
[facility]  Nephrology Daily Progress Note    Ramón Bedoya  [id number]  74 year old      HPI:   Ramón Bedoya is a 74 year old male.  Overall feels better.  Seems more alert.  Eating well.  No SOB on RA.       ROS:     Constitutional:  Negative for decreased activity, fever, irritability and lethargy  Endocrine:  Negative for abnormal sleep patterns, increased activity, polydipsia and polyphagia  Cardiovascular:  Negative for cool extremity and irregular heartbeat/palpitations  Gastrointestinal:  Negative for abdominal pain, constipation, decreased appetite, diarrhea and vomiting  Genitourinary:  Negative for dysuria and hematuria  Hema/Lymph:  Negative for easy bleeding and easy bruising  Integumentary:  Negative for pruritus and rash  Musculoskeletal:  Negative for bone/joint symptoms  Neurological:  Negative for gait disturbance  Psychiatric:  Negative for inappropriate interaction and psychiatric symptoms  Respiratory:  Negative for cough, dyspnea and wheezing      PHYSICAL EXAM:   Temp:  [97.2 °F (36.2 °C)-98.8 °F (37.1 °C)] 98.2 °F (36.8 °C)  Pulse:  [62-80] 80  Resp:  [18-20] 20  BP: (119-155)/(54-75) 143/61  SpO2:  [91 %-98 %] 98 %  Patient Weight for the past 72 hrs:   Weight   04/07/24 0448 197 lb 14.4 oz (89.8 kg)   04/08/24 0539 198 lb 3.2 oz (89.9 kg)       Constitutional: appears well hydrated alert and responsive no acute distress noted  Neck/Thyroid: neck is supple without adenopathy  Lymphatic: no abnormal cervical, supraclavicular or axillary adenopathy is noted  Respiratory: normal to inspection lungs are clear to auscultation bilaterally normal respiratory effort  Cardiovascular: regular rate and rhythm no murmurs, gallups, or rubs  Abdomen: soft non-tender non-distended no organomegaly noted no masses  Musculoskeletal: full ROM all extremities good strength  no deformities  Extremities: no edema, cyanosis, or clubbing  Neurological: exam appropriate for age reflexes and motor skills  appropriate for age    Labs:  Lab Results   Component Value Date    WBC 12.5 04/09/2024    HGB 7.7 04/09/2024    HCT 21.9 04/09/2024    .0 04/09/2024    CREATSERUM 3.43 04/09/2024    BUN 56 04/09/2024     04/09/2024    K 3.8 04/09/2024     04/09/2024    CO2 25.0 04/09/2024     04/09/2024    CA 7.9 04/09/2024    ALB 3.4 04/09/2024    MG 2.0 04/09/2024    PHOS 4.2 04/09/2024     Recent Labs   Lab 04/07/24  0511 04/08/24  0524 04/09/24  0530   WBC 9.4 9.0 12.5*   HGB 7.6* 7.4* 7.7*   HCT 22.1* 21.7* 21.9*   .0 251.0 264.0     Recent Labs   Lab 04/04/24  0543 04/05/24  0515 04/06/24  0420 04/07/24  0511 04/08/24  0524 04/09/24  0530   * 230* 155* 142* 226* 103*   * 127* 142* 150* 108* 56*   CREATSERUM 6.09* 6.43* 7.04* 7.65* 6.11* 3.43*   CA 7.6* 7.5* 7.4* 7.7* 7.6* 7.9*   ALB 3.6  3.6 3.4 3.4 3.4  --  3.4   * 136 137 135* 137 138   K 4.9 4.8 4.8 4.8 4.8 3.8    107 108 106 106 105   CO2 17.0* 16.0* 17.0* 16.0* 20.0* 25.0   ALKPHO 81  --   --  69  --   --    AST 39*  --   --  21  --   --    ALT 41  --   --  30  --   --    BILT 0.5  --   --  0.3  --   --    TP 6.5  --   --  6.1  --   --    PHOS 7.3* 7.3* 8.3*  --   --  4.2       Imaging  No results found.      Medications:    Current Facility-Administered Medications:     sodium chloride 0.9 % IV bolus 100 mL, 100 mL, Intravenous, Q30 Min PRN **AND** albumin human (Albumin) 25% injection 25 g, 25 g, Intravenous, PRN Dialysis    epoetin manasa (Epogen, Procrit) 5,000 Units injection, 5,000 Units, Subcutaneous, Once per day on Monday Wednesday Friday    albuterol (Ventolin) (2.5 MG/3ML) 0.083% nebulizer solution 2.5 mg, 2.5 mg, Nebulization, TID    OLANZapine (ZyPREXA) tab 2.5 mg, 2.5 mg, Oral, Nightly    gabapentin (Neurontin) cap 100 mg, 100 mg, Oral, Nightly    pantoprazole (Protonix) 40 mg in sodium chloride 0.9% PF 10 mL IV push, 40 mg, Intravenous, Q12H    insulin degludec 100 units/mL flextouch 22 Units, 22  Units, Subcutaneous, Nightly    insulin aspart (NovoLOG) 100 Units/mL FlexPen 8 Units, 8 Units, Subcutaneous, TID CC    ipratropium-albuterol (Duoneb) 0.5-2.5 (3) MG/3ML inhalation solution 3 mL, 3 mL, Nebulization, Q6H PRN    glucose (Dex4) 15 GM/59ML oral liquid 15 g, 15 g, Oral, Q15 Min PRN **OR** glucose (Glutose) 40% oral gel 15 g, 15 g, Oral, Q15 Min PRN **OR** glucose-vitamin C (Dex-4) chewable tab 4 tablet, 4 tablet, Oral, Q15 Min PRN **OR** dextrose 50% injection 50 mL, 50 mL, Intravenous, Q15 Min PRN **OR** glucose (Dex4) 15 GM/59ML oral liquid 30 g, 30 g, Oral, Q15 Min PRN **OR** glucose (Glutose) 40% oral gel 30 g, 30 g, Oral, Q15 Min PRN **OR** glucose-vitamin C (Dex-4) chewable tab 8 tablet, 8 tablet, Oral, Q15 Min PRN    insulin aspart (NovoLOG) 100 Units/mL FlexPen 1-11 Units, 1-11 Units, Subcutaneous, TID CC and HS    acetaminophen (Tylenol Extra Strength) tab 500 mg, 500 mg, Oral, Q4H PRN    ondansetron (Zofran) 4 MG/2ML injection 4 mg, 4 mg, Intravenous, Q6H PRN    temazepam (Restoril) cap 15 mg, 15 mg, Oral, Nightly PRN    guaiFENesin (Robitussin) 100 MG/5 ML oral liquid 200 mg, 200 mg, Oral, Q4H PRN    benzonatate (Tessalon) cap 200 mg, 200 mg, Oral, TID PRN    vancomycin (Vancocin) cap 125 mg, 125 mg, Oral, Daily    amiodarone (Pacerone) tab 200 mg, 200 mg, Oral, Daily    amLODIPine (Norvasc) tab 10 mg, 10 mg, Oral, Daily    atorvastatin (Lipitor) tab 10 mg, 10 mg, Oral, Daily    calcitriol (Rocaltrol) cap 0.25 mcg, 0.25 mcg, Oral, Q MWF    hydrALAZINE (Apresoline) tab 25 mg, 25 mg, Oral, TID    sodium bicarbonate tab 650 mg, 650 mg, Oral, BID    levETIRAcetam (Keppra) 500 mg/5mL injection 500 mg, 500 mg, Intravenous, Q12H    Allergies:  Allergies   Allergen Reactions    Penicillins UNKNOWN       Input/Output:    Intake/Output Summary (Last 24 hours) at 4/9/2024 1413  Last data filed at 4/9/2024 1300  Gross per 24 hour   Intake 1194 ml   Output 4250 ml   Net -3056 ml          ASSESSMENT/PLAN:    Assessment   Patient Active Problem List   Diagnosis    Chronic subdural hematoma (HCC)    CKD (chronic kidney disease) stage 4, GFR 15-29 ml/min (HCC)    Anemia in stage 4 chronic kidney disease (HCC)    Hyponatremia    Anemia    Acute renal failure (ARF) (HCC)    Acute kidney injury (HCC)    Metabolic acidosis    Respiratory alkalosis    Hyperglycemia    Dyspnea, unspecified type    Influenzal bronchitis    Palliative care by specialist    Stage 5 chronic kidney disease not on chronic dialysis (HCC)    ESRD on hemodialysis (HCC)       Overall better with HD.  Volume status improved.  Just completed 3rd HD earlier today.  OK for discharge from my perspective.  Out  pt HD scheduled for T,T,S in [place].  They know they will be asigned a new nephrologist.             4/9/2024  Bryce Hirsch MD

## 2024-04-09 NOTE — DISCHARGE SUMMARY
[facility]  part of [facility]    Discharge Summary    Ramón Bedoya Patient Status:  Inpatient    3/21/1950 MRN [id number]   Location [facility]5W Attending Avel Camp MD   Hosp Day # 7 PCP LEONEL CORTES     Date of Admission: 2024 Disposition:   Inpt Physical Rehab Facility or Physical Rehab Unit     Date of Discharge:  2024     Admitting Diagnosis:   Dyspnea, unspecified type [R06.00]    Hospital Discharge Diagnoses:    Multifocal pneumonia   Influenza A infection  Acute hypoxemic respiratory failure   Acute kidney injury on CKD 4  Metabolic acidosis  Hyponatremia  Hypertensive nephropathy  Diabetic nephropathy  Acute on chronic Anemia  Anemia of chronic kidney disease  Iron def anemia   Elevated troponin  Bradycardia  DM2  Hyperglycemia due to steroids   HTN  HLD  H/o SDH  H/o A.fib        Problem List:     Patient Active Problem List   Diagnosis    Chronic subdural hematoma (HCC)    CKD (chronic kidney disease) stage 4, GFR 15-29 ml/min (HCC)    Anemia in stage 4 chronic kidney disease (HCC)    Hyponatremia    Anemia    Acute renal failure (ARF) (HCC)    Acute kidney injury (HCC)    Metabolic acidosis    Respiratory alkalosis    Hyperglycemia    Dyspnea, unspecified type    Influenzal bronchitis    Palliative care by specialist    Stage 5 chronic kidney disease not on chronic dialysis (HCC)    ESRD on hemodialysis (HCC)       Physical Exam:      /61 (BP Location: Left arm)   Pulse 80   Temp 98.2 °F (36.8 °C) (Oral)   Resp 20   Wt 198 lb 3.2 oz (89.9 kg)   SpO2 98%   BMI 35.11 kg/m²     Gen:  NAD.  A and O x  3  CV:   RRR.  No m/g/r  Pulm:   CTA bilat  Abd:   +bs, soft, NT, ND  LE:  No c/c/e  Neuro:  nonfocal      Reason for Admission:   Acute bronchitis, hypoxemia.     HISTORY OF PRESENT ILLNESS:  The patient is a 74-year-old  male with known past medical history of near end-stage renal disease.  Traveled with his family to [place] and on the  way back he started developing wheezing and shortness of breath.  The patient has persistent symptoms.  Brought in today to the emergency department for evaluation.  Upon arrival, pulse ox was 65% on room air.  He was in respiratory distress upon arrival.  CBC showed white blood cell count of 11.0 with left shift, hemoglobin 8.9.  Arterial blood gas showed pH of 7.34 and pCO2 of 34.  Chemistry showed sodium 134, bicarb 18, BUN 77, creatinine 5.32, GFR 11 which is slightly below his baseline.  Usually he runs in the teens.  GeneXpert viral panel was positive for influenza A.  Troponin was 68.  The patient was started on IV Solu-Medrol, nebulizer treatments, and furosemide.  He was given doxycycline and ceftriaxone.  He will be admitted to hospital for further management.  Blood cultures were obtained.  Placed on BiPAP support.    Hospital Course:     Multifocal pneumonia   Influenza A infection  Acute hypoxemic respiratory failure  - resolved  - Influenza A positive on arrival, hold Tamiflu since out of window.   - CXR multifocal PNA with improvement   - prednisone discontinued.   - Completed Rocephin and azithromycin.   - on RA now.   - Pulm was on consult.   - blood cx x 2 NGTD.   - IS/Flutter valve.      Acute kidney injury on CKD 4  Metabolic acidosis  Hyponatremia  Hypertensive nephropathy  Diabetic nephropathy  - baseline Cr around 3-3.5,  - renal was on consult  - s/p R internal jugular tunneled catheter 4/7   Had 3 HD sessions.  - SW to set up HD for outpt      Acute on chronic Anemia  Anemia of chronic kidney disease  Iron def anemia   - hgb baseline 8-9 per EMR.   - hgb 8.9 on arrival  - was down to 6.8 --> received 1unit PRBC  - iron low --> Venofer.   - FOB +   - IV PPI BID.   - GI consulted   - no overt bleeding seen. Would hold off on endosopy unless overt bleeding or drop in H/H   - outpt GI f/u     Elevated troponin  - trending down   - likely supple demand ischemia from hypoxia infection       Bradycardia  - HR around 50, lowest 45  - no BB, CCB or clonidine     DM2, worse from steroids.   - A1c 6.6  Now off of steroids.  Resume home regimen.     HTN  HLD  - continue home meds, except losartan     Other medical problems  H/o SDH  H/o A.fib        Consultations:   GI, nephrology, pulmonology, palliative care,     Discharge Condition:  Good    Discharge Medications:      Discharge Medications        CONTINUE taking these medications        Instructions Prescription details   acetaminophen 500 MG Tabs  Commonly known as: Tylenol Extra Strength      Take 1 tablet (500 mg total) by mouth every 4 (four) hours as needed for Fever or Pain.   Quantity: 120 tablet  Refills: 0     amiodarone 200 MG Tabs  Commonly known as: Pacerone      Take 1 tablet (200 mg total) by mouth daily.   Refills: 0     amLODIPine 10 MG Tabs  Commonly known as: Norvasc      Take 1 tablet (10 mg total) by mouth daily.   Quantity: 30 tablet  Refills: 0     atorvastatin 10 MG Tabs  Commonly known as: Lipitor      Take 1 tablet (10 mg total) by mouth daily.   Refills: 0     BD Pen Needle Mini U/F 31G X 5 MM Misc  Generic drug: Insulin Pen Needle      Take 1 Bottle by mouth As Directed.   Refills: 0     calcitriol 0.25 MCG Caps  Commonly known as: Rocaltrol      Take 1 capsule (0.25 mcg total) by mouth Every Monday, Wednesday, and Friday.   Refills: 0     divalproex  MG Tbec DR tab  Commonly known as: Depakote      Take 1 tablet (500 mg total) by mouth 3 (three) times daily.   Refills: 0     Ferrous Sulfate 324 (65 Fe) MG Tbec      Take 1 tablet by mouth daily.   Refills: 0     FreeStyle Rina 2 Franklin Giselle      USE EVERY 6 HOURS   Refills: 0     FreeStyle Rina 2 Sensor Misc      USE 1 SENSOR EVERY 2 WEEKS   Refills: 0     hydrALAZINE 25 MG Tabs  Commonly known as: Apresoline      Take 1 tablet (25 mg total) by mouth 3 (three) times daily.   Refills: 0     insulin glargine 100 UNIT/ML Soln  Commonly known as: Lantus      Inject 6  Units into the skin nightly.   Refills: 0     levETIRAcetam 500 MG Tabs  Commonly known as: Keppra      Take 1 tablet (500 mg total) by mouth 2 (two) times daily.   Refills: 0     Naloxone HCl 4 MG/0.1ML Liqd      4 mg by Nasal route as needed. If patient remains unresponsive, repeat dose in other nostril 2-5 minutes after first dose.   Quantity: 1 kit  Refills: 0     sodium bicarbonate 650 MG Tabs      Take 1 tablet (650 mg total) by mouth 2 (two) times daily.   Refills: 0            STOP taking these medications      furosemide 40 MG Tabs  Commonly known as: Lasix        losartan 25 MG Tabs  Commonly known as: Cozaar                 Follow up Visits:     Follow-up Information       Bishnu Osborne. Schedule an appointment as soon as possible for a visit in 1 week(s).    Specialty: Geriatrics  Contact information:  [address] [address]  [phone]               Eli Park MD Follow up.    Specialty: NEPHROLOGY  Why: As needed for nephrology.  Contact information:  [address]  [address]  [place] [address]  [phone]               Blossom Carlson MD Follow up.    Specialty: GASTROENTEROLOGY  Why: As needed for gastroenterology.  Contact information:  [address]  [place] [address]  [phone]                             Hospital Discharge Diagnoses:  Acute influenza A infection    Lace+ Score: 68  59-90 High Risk  29-58 Medium Risk  0-28   Low Risk.    TCM Follow-Up Recommendation:  LACE > 58: High Risk of readmission after discharge from the hospital.    >35 minutes spent preparing this discharge.    Avel Zaho MD  4/9/2024  3:32 PM

## 2024-04-09 NOTE — PLAN OF CARE
Per Dialysis RN 2L removed.   Patient educated on electrolyte and fluid balance by Arturo RN. Reinforced by this RN at bedside.     Problem: Diabetes/Glucose Control  Goal: Glucose maintained within prescribed range  Description: INTERVENTIONS:  - Monitor Blood Glucose as ordered  - Assess for signs and symptoms of hyperglycemia and hypoglycemia  - Administer ordered medications to maintain glucose within target range  - Assess barriers to adequate nutritional intake and initiate nutrition consult as needed  - Instruct patient on self management of diabetes  Outcome: Progressing     Problem: RESPIRATORY - ADULT  Goal: Achieves optimal ventilation and oxygenation  Description: INTERVENTIONS:  - Assess for changes in respiratory status  - Assess for changes in mentation and behavior  - Position to facilitate oxygenation and minimize respiratory effort  - Oxygen supplementation based on oxygen saturation or ABGs  - Provide Smoking Cessation handout, if applicable  - Encourage broncho-pulmonary hygiene including cough, deep breathe, Incentive Spirometry  - Assess the need for suctioning and perform as needed  - Assess and instruct to report SOB or any respiratory difficulty  - Respiratory Therapy support as indicated  - Manage/alleviate anxiety  - Monitor for signs/symptoms of CO2 retention  Outcome: Progressing     Problem: SAFETY ADULT - FALL  Goal: Free from fall injury  Description: INTERVENTIONS:  - Assess pt frequently for physical needs  - Identify cognitive and physical deficits and behaviors that affect risk of falls.  - [place] fall precautions as indicated by assessment.  - Educate pt/family on patient safety including physical limitations  - Instruct pt to call for assistance with activity based on assessment  - Modify environment to reduce risk of injury  - Provide assistive devices as appropriate  - Consider OT/PT consult to assist with strengthening/mobility  - Encourage toileting schedule  Outcome:  Progressing     Problem: GENITOURINARY - ADULT  Goal: Absence of urinary retention  Description: INTERVENTIONS:  - Assess patient’s ability to void and empty bladder  - Monitor intake/output and perform bladder scan as needed  - Follow urinary retention protocol/standard of care  - Consider collaborating with pharmacy to review patient's medication profile  - Implement strategies to promote bladder emptying  Outcome: Progressing     Problem: NEUROLOGICAL - ADULT  Goal: Achieves stable or improved neurological status  Description: INTERVENTIONS  - Assess for and report changes in neurological status  - Initiate measures to prevent increased intracranial pressure  - Maintain blood pressure and fluid volume within ordered parameters to optimize cerebral perfusion and minimize risk of hemorrhage  - Monitor temperature, glucose, and sodium. Initiate appropriate interventions as ordered  Outcome: Progressing  Goal: Absence of seizures  Description: INTERVENTIONS  - Monitor for seizure activity  - Administer anti-seizure medications as ordered  - Monitor neurological status  Outcome: Progressing     Problem: Patient/Family Goals  Goal: Patient/Family Long Term Goal  Description: Patient's Long Term Goal: respiratory relief    Interventions:  - continuous bipap  -frequent staff rounding  -respiratory meds  -monitor O2 sats  - See additional Care Plan goals for specific interventions  Outcome: Progressing  Goal: Patient/Family Short Term Goal  Description: Patient's Short Term Goal: discharge home    Interventions:   - Monitor vitals  - Monitor appropriate labs  - Administer medications as ordered  - Follow MD's orders  - Update patient on plan of care   - Discharge planning     - See additional Care Plan goals for specific interventions  Outcome: Progressing     Problem: METABOLIC/FLUID AND ELECTROLYTES - ADULT  Goal: Electrolytes maintained within normal limits  Description: INTERVENTIONS:  - Monitor labs and rhythm and  assess patient for signs and symptoms of electrolyte imbalances  - Administer electrolyte replacement as ordered  - Monitor response to electrolyte replacements, including rhythm and repeat lab results as appropriate  - Fluid restriction as ordered  - Instruct patient on fluid and nutrition restrictions as appropriate  Outcome: Progressing  Goal: Hemodynamic stability and optimal renal function maintained  Description: INTERVENTIONS:  - Monitor labs and assess for signs and symptoms of volume excess or deficit  - Monitor intake, output and patient weight  - Monitor urine specific gravity, serum osmolarity and serum sodium as indicated or ordered  - Monitor response to interventions for patient's volume status, including labs, urine output, blood pressure (other measures as available)  - Encourage oral intake as appropriate  - Instruct patient on fluid and nutrition restrictions as appropriate  Outcome: Progressing     Problem: PAIN - ADULT  Goal: Verbalizes/displays adequate comfort level or patient's stated pain goal  Description: INTERVENTIONS:  - Encourage pt to monitor pain and request assistance  - Assess pain using appropriate pain scale  - Administer analgesics based on type and severity of pain and evaluate response  - Implement non-pharmacological measures as appropriate and evaluate response  - Consider cultural and social influences on pain and pain management  - Manage/alleviate anxiety  - Utilize distraction and/or relaxation techniques  - Monitor for opioid side effects  - Notify MD/LIP if interventions unsuccessful or patient reports new pain  - Anticipate increased pain with activity and pre-medicate as appropriate  Outcome: Progressing

## 2024-04-09 NOTE — PLAN OF CARE
Problem: Diabetes/Glucose Control  Goal: Glucose maintained within prescribed range  Description: INTERVENTIONS:  - Monitor Blood Glucose as ordered  - Assess for signs and symptoms of hyperglycemia and hypoglycemia  - Administer ordered medications to maintain glucose within target range  - Assess barriers to adequate nutritional intake and initiate nutrition consult as needed  - Instruct patient on self management of diabetes  Outcome: Progressing     Problem: RESPIRATORY - ADULT  Goal: Achieves optimal ventilation and oxygenation  Description: INTERVENTIONS:  - Assess for changes in respiratory status  - Assess for changes in mentation and behavior  - Position to facilitate oxygenation and minimize respiratory effort  - Oxygen supplementation based on oxygen saturation or ABGs  - Provide Smoking Cessation handout, if applicable  - Encourage broncho-pulmonary hygiene including cough, deep breathe, Incentive Spirometry  - Assess the need for suctioning and perform as needed  - Assess and instruct to report SOB or any respiratory difficulty  - Respiratory Therapy support as indicated  - Manage/alleviate anxiety  - Monitor for signs/symptoms of CO2 retention  Outcome: Progressing     Problem: SAFETY ADULT - FALL  Goal: Free from fall injury  Description: INTERVENTIONS:  - Assess pt frequently for physical needs  - Identify cognitive and physical deficits and behaviors that affect risk of falls.  - [place] fall precautions as indicated by assessment.  - Educate pt/family on patient safety including physical limitations  - Instruct pt to call for assistance with activity based on assessment  - Modify environment to reduce risk of injury  - Provide assistive devices as appropriate  - Consider OT/PT consult to assist with strengthening/mobility  - Encourage toileting schedule  Outcome: Progressing     Problem: GENITOURINARY - ADULT  Goal: Absence of urinary retention  Description: INTERVENTIONS:  - Assess patient’s  ability to void and empty bladder  - Monitor intake/output and perform bladder scan as needed  - Follow urinary retention protocol/standard of care  - Consider collaborating with pharmacy to review patient's medication profile  - Implement strategies to promote bladder emptying  Outcome: Progressing     Problem: NEUROLOGICAL - ADULT  Goal: Achieves stable or improved neurological status  Description: INTERVENTIONS  - Assess for and report changes in neurological status  - Initiate measures to prevent increased intracranial pressure  - Maintain blood pressure and fluid volume within ordered parameters to optimize cerebral perfusion and minimize risk of hemorrhage  - Monitor temperature, glucose, and sodium. Initiate appropriate interventions as ordered  Outcome: Progressing  Goal: Absence of seizures  Description: INTERVENTIONS  - Monitor for seizure activity  - Administer anti-seizure medications as ordered  - Monitor neurological status  Outcome: Progressing     Problem: Patient/Family Goals  Goal: Patient/Family Long Term Goal  Description: Patient's Long Term Goal: respiratory relief    Interventions:  - continuous bipap  -frequent staff rounding  -respiratory meds  -monitor O2 sats  - See additional Care Plan goals for specific interventions  Outcome: Progressing  Goal: Patient/Family Short Term Goal  Description: Patient's Short Term Goal: discharge home    Interventions:   - Monitor vitals  - Monitor appropriate labs  - Administer medications as ordered  - Follow MD's orders  - Update patient on plan of care   - Discharge planning     - See additional Care Plan goals for specific interventions  Outcome: Progressing     Problem: METABOLIC/FLUID AND ELECTROLYTES - ADULT  Goal: Electrolytes maintained within normal limits  Description: INTERVENTIONS:  - Monitor labs and rhythm and assess patient for signs and symptoms of electrolyte imbalances  - Administer electrolyte replacement as ordered  - Monitor response  to electrolyte replacements, including rhythm and repeat lab results as appropriate  - Fluid restriction as ordered  - Instruct patient on fluid and nutrition restrictions as appropriate  Outcome: Progressing  Goal: Hemodynamic stability and optimal renal function maintained  Description: INTERVENTIONS:  - Monitor labs and assess for signs and symptoms of volume excess or deficit  - Monitor intake, output and patient weight  - Monitor urine specific gravity, serum osmolarity and serum sodium as indicated or ordered  - Monitor response to interventions for patient's volume status, including labs, urine output, blood pressure (other measures as available)  - Encourage oral intake as appropriate  - Instruct patient on fluid and nutrition restrictions as appropriate  Outcome: Progressing     Problem: PAIN - ADULT  Goal: Verbalizes/displays adequate comfort level or patient's stated pain goal  Description: INTERVENTIONS:  - Encourage pt to monitor pain and request assistance  - Assess pain using appropriate pain scale  - Administer analgesics based on type and severity of pain and evaluate response  - Implement non-pharmacological measures as appropriate and evaluate response  - Consider cultural and social influences on pain and pain management  - Manage/alleviate anxiety  - Utilize distraction and/or relaxation techniques  - Monitor for opioid side effects  - Notify MD/LIP if interventions unsuccessful or patient reports new pain  - Anticipate increased pain with activity and pre-medicate as appropriate  Outcome: Progressing     No acute changes overnight, vital signs being monitored, frequent rounding by nursing staff, appropriate safety precautions in place, call light within reach, dialysis session for 4/9/24 scheduled.

## 2024-04-09 NOTE — CM/SW NOTE
04/09/24 1400   Discharge disposition   Expected discharge disposition Home-Health   Post Acute Care Provider Residential   Outpatient services Palliative;Dialysis  ([facility], [facility] for outpatient)   Discharge transportation Private car     Pt discussed during nursing rounds. Pt is stable for [place] today. MD [place] order entered. [facility] will provide RN and therapy services at [place], liaison Dalila notified of dc home today. [facility] will provide CPC at [place], jaime Almazan notified of [place] home today. [facility] will provide outpatient HD services at [place], TTS at 10:20am. CM sent 2nd set of HD flowsheets in [facility] and faxed to [facility] at [phone]. Pt's daughter will provide transport at [place].    Plan: Home w/spouse with [facility], [facility] for CPC, and [facility] for outpatient HD today.    / to remain available for support and/or discharge planning.     ADALBERTO Zhu    [phone]

## 2024-04-10 ENCOUNTER — PATIENT MESSAGE (OUTPATIENT)
Dept: SURGERY | Facility: CLINIC | Age: 74
End: 2024-04-10

## 2024-04-10 ENCOUNTER — TELEPHONE (OUTPATIENT)
Dept: SURGERY | Facility: CLINIC | Age: 74
End: 2024-04-10

## 2024-04-10 NOTE — TELEPHONE ENCOUNTER
Also see [facility] msg from 4-10-24.     Patient was last seen on 3-20-24 by BOWEN Torres.  Per OV note:  \"Ramón Bedoya is a very pleasant 73 year old male who presents today for: s/p bilateral crani, SDH   The patient endorses no headaches.  He states his tremors are continuing to improve.  He is on Depakote.  He was on Depakote from a fall in July.  He had arrived at the hospital given tremulousness.  He was diagnosed with subdural collections and was taken to surgery.  He has been on Depakote since.  He has not established with neurology.  Depakote management by his PCP he states.  He would like to become established with neurology to see if he can come off of the Depakote.  Besides tremulousness, no seizure activity he states.  He has not had any seizure-like activity other than tremors since surgical intervention.  PLAN:   1. Medication: None prescribed   2. Imaging:                 - Reviewed today:                              - CT head:                                            - Agree with radiology, stable hairline subdural fluid collections noted, likely chronic subdural hematomas vs hygromas.                 - Ordered today:                              - CT head:                                            - Given persistent SDH vs hygroma, will repeat head CT in 3 months. Possibility that the patient may have a chronic subdural hygroma that may not completely resolve. If CT head stable/improved in June and the patient is neurologically stable, will plan for no further CT imaging from a NSGY stand point unless a change in neuro status. Clearance from a NSGY stand point to fly to [facility] pending repeat head CT in June  3. Follow up in 3 months or call or follow up sooner or go to the ED for any new, worsening or concerning signs or symptoms \"      Patient was hospitalized for reasons unrelated to neurosurgery from 4-2-24 to 4-9-24.  Neurosurgery was not consulted during this hospitalization.   We have not  prescribed this to him in the past.  Depakote is managed by his PCP.      Called Kristen and discussed that we will defer depakote/keppra with patient's PCP or neurologist as we are not prescribing either at this time.  She was appreciative.

## 2024-04-10 NOTE — TELEPHONE ENCOUNTER
msg was sent by ERYN Salvador to patient./family.      See TE telephone call from today as well.  Received call from [facility] ERYN Peterson regarding medications.  Informed her we are not managing her meds and to contact either the neurologist or the PCP.  Confirmed PCP on file.  She was appreciative.

## 2024-04-10 NOTE — TELEPHONE ENCOUNTER
Kristen from [facility], is requesting to have divalproex  MG medication sent to pharmacy due to patient was not discharged from hospital on 04/09/2024 with medication.     Preferred pharmacy:  [id number] - [facility] [address] [phone], [phone] [[id number]]     Kristen would like a call when prescription has been sent over to the office.

## 2024-04-10 NOTE — TELEPHONE ENCOUNTER
From: Ramón Bedoya  To: Eli Torres  Sent: 4/10/2024 10:56 AM CDT  Subject: Medication error?    Dear Dr. Torres,    We are looking for your help reviewing hospital discharge paperwork on 4/9 for Ramón.    He has two antiseizure meds after discharge.  Can you help us know which stays and which goes?    Dad decided to get dialysis and had pneumonia.    He has left hospital without a doctor speaking to him.    The meds list is attached.    We got a new prescription divalproex Dr 500 1 tablet 3x a day. Does this replace the keppra?

## 2024-04-10 NOTE — TELEPHONE ENCOUNTER
Rodolfo message received by patient's family.    Concern regarding anti seizure medications after discharge on 4.9.24   Hospitalist was DR. Herron      It does not appear that neurosurgery consulted during hospialization from 4.2.24-4.9.24      Patient was seen in office on 3.20.24 for SDH  Patient did hav a bilateral craniotomy by  on 8.17.23  It does not appear pt has ever received Keppra from out office or team.    Routed to provider for input.

## 2024-04-10 NOTE — TELEPHONE ENCOUNTER
Patient was recommended to see neurology per OV from January. Has the patient followed up? If not I recommend doing so and defer medication therapy to neurology.    Please advise, thank you

## 2024-08-26 ENCOUNTER — HOSPITAL ENCOUNTER (OUTPATIENT)
Dept: INTERVENTIONAL RADIOLOGY/VASCULAR | Facility: HOSPITAL | Age: 74
Discharge: HOME | End: 2024-08-26
Attending: NURSE PRACTITIONER | Admitting: STUDENT IN AN ORGANIZED HEALTH CARE EDUCATION/TRAINING PROGRAM
Payer: MEDICARE

## 2024-08-26 VITALS
DIASTOLIC BLOOD PRESSURE: 59 MMHG | SYSTOLIC BLOOD PRESSURE: 153 MMHG | WEIGHT: 198 LBS | HEART RATE: 55 BPM | OXYGEN SATURATION: 95 % | HEIGHT: 63 IN | BODY MASS INDEX: 35.08 KG/M2 | TEMPERATURE: 97 F

## 2024-08-26 DIAGNOSIS — N18.6 ESRD (END STAGE RENAL DISEASE) (HCC): ICD-10-CM

## 2024-08-26 PROCEDURE — 36415 COLL VENOUS BLD VENIPUNCTURE: CPT

## 2024-08-26 PROCEDURE — 36589 REMOVAL TUNNELED CV CATH: CPT | Performed by: RADIOLOGY

## 2024-08-26 PROCEDURE — 0JPVXXZ REMOVAL OF TUNNELED VASCULAR ACCESS DEVICE FROM UPPER EXTREMITY SUBCUTANEOUS TISSUE AND FASCIA, EXTERNAL APPROACH: ICD-10-PCS | Performed by: NURSE PRACTITIONER

## 2024-08-26 RX ORDER — LIDOCAINE HYDROCHLORIDE 20 MG/ML
INJECTION, SOLUTION INFILTRATION; PERINEURAL
Status: COMPLETED
Start: 2024-08-26 | End: 2024-08-26

## 2024-08-26 RX ORDER — MIDAZOLAM HYDROCHLORIDE 1 MG/ML
INJECTION INTRAMUSCULAR; INTRAVENOUS
Status: DISCONTINUED
Start: 2024-08-26 | End: 2024-08-26 | Stop reason: WASHOUT

## 2024-08-26 NOTE — IVS NOTE
DISCHARGE NOTE     Pt is able to sit up and ambulate without difficulty.   Pt voided.  Right chest procedural site remains dry and intact with good circulation, motion and sensation.   No signs or symptoms of bleeding/hematoma noted.   Pt denies any pain or discomfort at this time.  Instructions provided, patient/family verbalizes understanding.   Dr. Slaughter spoke with patient/family post procedure.     Pt discharge via wheelchair to [facility]      Follow up Appointment: n/a    New Prescription: n/a

## 2024-08-26 NOTE — H&P
History & Physical Examination    Patient Name: Ramón Bedoya  MRN: [id number]  CSN: [id number]  YOB: 1950    Diagnosis: ESRD on HD    Present Illness: ESRD on HD via L UE AVF.  Permacath no longer necessary and to be removed.    Medications Prior to Admission   Medication Sig Dispense Refill Last Dose    hydrALAZINE 25 MG Oral Tab Take 1 tablet (25 mg total) by mouth 3 (three) times daily.   8/26/2024    insulin glargine 100 UNIT/ML Subcutaneous Solution Inject 6 Units into the skin nightly.   8/25/2024    sodium bicarbonate 650 MG Oral Tab Take 1 tablet (650 mg total) by mouth 2 (two) times daily.   8/26/2024    levETIRAcetam 500 MG Oral Tab Take 1 tablet (500 mg total) by mouth 2 (two) times daily.   8/26/2024    atorvastatin 10 MG Oral Tab Take 1 tablet (10 mg total) by mouth daily.   8/25/2024    amiodarone 200 MG Oral Tab Take 1 tablet (200 mg total) by mouth daily.   8/26/2024    amLODIPine 10 MG Oral Tab Take 1 tablet (10 mg total) by mouth daily. 30 tablet 0 8/26/2024    calcitriol 0.25 MCG Oral Cap Take 1 capsule (0.25 mcg total) by mouth Every Monday, Wednesday, and Friday.   8/26/2024    BD PEN NEEDLE MINI U/F 31G X 5 MM Does not apply Misc Take 1 Bottle by mouth As Directed.       Continuous Blood Gluc Sensor (FREESTYLE NANDO 2 SENSOR) Does not apply Misc USE 1 SENSOR EVERY 2 WEEKS       Continuous Blood Gluc  (FREESTYLE NANDO 2 READER) Does not apply Device USE EVERY 6 HOURS       acetaminophen 500 MG Oral Tab Take 1 tablet (500 mg total) by mouth every 4 (four) hours as needed for Fever or Pain. 120 tablet 0 Unknown    Naloxone HCl 4 MG/0.1ML Nasal Liquid 4 mg by Nasal route as needed. If patient remains unresponsive, repeat dose in other nostril 2-5 minutes after first dose. 1 kit 0 Unknown     Current Facility-Administered Medications   Medication Dose Route Frequency    fentaNYL (Sublimaze) 50 mcg/mL injection        midazolam (Versed) 2 MG/2ML injection           Allergies:    Allergies   Allergen Reactions    Penicillins UNKNOWN       Past Medical History:    Chronic kidney disease, stage 4 (severe) (HCC)    Diabetes (HCC)    Essential hypertension    TBI (traumatic brain injury) (HCC)     Past Surgical History:   Procedure Laterality Date    Brain surgery  08/17/2023    bilat craniotomy for evacuation of subdural hematoma     No family history on file.  Social History     Tobacco Use    Smoking status: Never    Smokeless tobacco: Never   Substance Use Topics    Alcohol use: Not Currently       SYSTEM Check if Review is Normal Check if Physical Exam is Normal If not normal, please explain:   HEENT [x ] [x ]    NECK & BACK [x ] [x ] R internal jugular Permacath     HEART [x ] [x ]    LUNGS [x ] [ x]    ABDOMEN [x ] [ x]          EXTREMITIES [x ] [ x] L AVF c thrill           [ x ] I have discussed the risks and benefits and alternatives with the patient/family.  They understand and agree to proceed with plan of care.  [ x ] I have reviewed the History and Physical done within the last 30 days.  Any changes noted above.    Ric Slaughter MD  8/26/2024  9:33 AM

## 2024-08-26 NOTE — PROGRESS NOTES
Patients tunneled Dialysis line removed bedside  by DILCIA Davis. Patient tolerated well. Report to Serina.

## 2024-08-26 NOTE — PROCEDURES
[facility]  part of Merged with [facility]  Procedure Note    Ramónarmida Bedoya Patient Status:  Outpatient    3/21/1950 MRN [id number]   Location [facility] INTERVENTIONAL SUITES Attending Dasha Crocker Day # 0 PCP LEONEL CORTES     Procedure: tunneled hemodialysis catheter removal    Anesthesia:  Local    Blood Loss:  Minimal     Complications:  None    Conclusion: Successful right chest tunneled hemodialysis catheter removal    Linda Davis, BASIA  2024

## 2024-08-26 NOTE — DISCHARGE INSTRUCTIONS
INTERVENTIONAL RADIOLOGY  [facility]  [phone]     Patient Name:  Ramón Bedoya    Procedure:  Tunneled Central Venous Catheter Removal    Site Care: Dermabond (skin glue) has been applied to your incision.  Do not scrub the area.  Allow the Dermabond to flake off on its own.  You may shower in 48 hours.                                        Activity/Diet  No heavy lifting or strenuous activity for 48 hours.  Drink plenty of fluids, unless you have otherwise been told to restrict your fluid intake.  Do not drink alcohol for 24 hours.  Do not drive,  operate heavy machinery, make important decisions or sign legal documents today.    Medications:  Take acetaminophen if needed for pain. Do not exceed 4000mg of acetaminophen in a 24-hour period., Do not take blood thinners, aspirin, or Plavix for 24 hours., and Make no changes to your existing medications.    Contact Interventional Radiology at [phone] if you have severe/unrelieved pain, fever, chills, dizziness/lightheadedness, or drainage/bleeding from your incision site.

## 2024-12-06 ENCOUNTER — HOSPITAL ENCOUNTER (OUTPATIENT)
Dept: CT IMAGING | Facility: HOSPITAL | Age: 74
Discharge: HOME | End: 2024-12-06
Attending: PHYSICIAN ASSISTANT
Payer: MEDICARE

## 2024-12-06 DIAGNOSIS — S06.5XAA SDH (SUBDURAL HEMATOMA) (HCC): ICD-10-CM

## 2024-12-06 DIAGNOSIS — Z98.890 S/P CRANIOTOMY: ICD-10-CM

## 2024-12-06 PROCEDURE — 70450 CT HEAD/BRAIN W/O DYE: CPT | Performed by: PHYSICIAN ASSISTANT

## 2024-12-18 ENCOUNTER — OFFICE VISIT (OUTPATIENT)
Dept: SURGERY | Facility: CLINIC | Age: 74
End: 2024-12-18
Payer: MEDICARE

## 2024-12-18 VITALS — HEART RATE: 90 BPM | SYSTOLIC BLOOD PRESSURE: 110 MMHG | DIASTOLIC BLOOD PRESSURE: 66 MMHG

## 2024-12-18 DIAGNOSIS — R41.3 MEMORY LOSS: ICD-10-CM

## 2024-12-18 DIAGNOSIS — Z98.890 S/P CRANIOTOMY: Primary | ICD-10-CM

## 2024-12-18 PROCEDURE — 99213 OFFICE O/P EST LOW 20 MIN: CPT | Performed by: NEUROLOGICAL SURGERY

## 2024-12-18 NOTE — PROGRESS NOTES
Neurosurgery Clinic Visit  2024    Ramón Bedoya PCP:  LEONEL CORTES    3/21/1950 MRN [id number]     HISTORY OF PRESENT ILLNESS:  Ramón Bedoya is a(n) 74 year old male who presents today in postoperative follow-up.  In 2023 I performed bilateral craniotomies for subdural evacuation.  He is accompanied by his daughter.  They requested CT scan, because he has had some mild confusion, as well as memory loss, over the past several months.      PHYSICAL EXAMINATION:  Vital Signs:  /66 (BP Location: Right arm, Patient Position: Sitting, Cuff Size: adult)   Pulse 90   On examination, there is no focal neurologic deficit.  Alert and oriented x 3.  No pronator drift.  The incisions well-healed.      REVIEW OF STUDIES:    CT scan of the head was performed 2024.  Images personally reviewed.  This demonstrates expected postoperative changes.  There is a trivial amount of residual subdural fluid, without mass effect or brain compression.      ASSESSMENT and PLAN:  1.  Bilateral subdural hematomas, status postcraniotomy for evacuation.  Overall, the patient is doing well.  No indication for further surgical intervention.    The confusion and memory loss may reflect residua of the head trauma, or dementia.  I have made a referral to neurology for further evaluation.    I reviewed my findings and recommendations in detail.  All questions answered.  I would be happy to see him back on an as-needed basis.        Time spent on counseling/coordination of care:  15 Minutes    Total time spent with patient:  15 Minutes        2024  1:29 PM     This report was dictated using voice recognition technology.  Errors in syntax or recognition may occur, and should not be construed to change the meaning of a sentence.

## 2024-12-18 NOTE — PATIENT INSTRUCTIONS
Refill policies:    Allow 2-3 business days for refills; controlled substances may take longer.  Contact your pharmacy at least 5 days prior to running out of medication and have them send an electronic request or submit request through the “request refill” option in your [employer] account.  Refills are not addressed on weekends; covering physicians do not authorize routine medications on weekends.  No narcotics or controlled substances are refilled after noon on Fridays or by on call physicians.  By law, narcotics must be electronically prescribed.  A 30 day supply with no refills is the maximum allowed.  If your prescription is due for a refill, you may be due for a follow up appointment.  To best provide you care, patients receiving routine medications need to be seen at least once a year.  Patients receiving narcotic/controlled substance medications need to be seen at least once every 3 months.  In the event that your preferred pharmacy does not have the requested medication in stock (e.g. Backordered), it is your responsibility to find another pharmacy that has the requested medication available.  We will gladly send a new prescription to that pharmacy at your request.    Scheduling Tests:    If your physician has ordered radiology tests such as MRI or CT scans, please contact Central Scheduling at [phone] right away to schedule the test.  Once scheduled, the [facility] Centralized Referral Team will work with your insurance carrier to obtain pre-certification or prior authorization.  Depending on your insurance carrier, approval may take 3-10 days.  It is highly recommended patients assure they have received an authorization before having a test performed.  If test is done without insurance authorization, patient may be responsible for the entire amount billed.      Precertification and Prior Authorizations:  If your physician has recommended that you have a procedure or additional testing performed the [facility]  Centralized Referral Team will contact your insurance carrier to obtain pre-certification or prior authorization.    You are strongly encouraged to contact your insurance carrier to verify that your procedure/test has been approved and is a COVERED benefit.  Although the [facility] Centralized Referral Team does its due diligence, the insurance carrier gives the disclaimer that \"Although the procedure is authorized, this does not guarantee payment.\"    Ultimately the patient is responsible for payment.   Thank you for your understanding in this matter.  Paperwork Completion:  If you require FMLA or disability paperwork for your recovery, please make sure to either drop it off or have it faxed to our office at [phone]. Be sure the form has your name and date of birth on it.  The form will be faxed to our Forms Department and they will complete it for you.  There is a 25$ fee for all forms that need to be filled out.  Please be aware there is a 10-14 day turnaround time.  You will need to sign a release of information (AGUILAR) form if your paperwork does not come with one.  You may call the Forms Department with any questions at [phone].  Their fax number is [phone].

## (undated) DEVICE — FLOSEAL HEMOSTATIC MATRIX, 5ML: Brand: FLOSEAL HEMOSTATIC MATRIX

## (undated) DEVICE — SUT VICRYL 3-0 CP-2 J761D

## (undated) DEVICE — SOL NACL IRRIG 0.9% 1000ML BTL

## (undated) DEVICE — CODMAN® DISPOSABLE PERFORATOR 14MM: Brand: CODMAN®

## (undated) DEVICE — E-Z BUTTON SWITCH PENCIL

## (undated) DEVICE — CURAD NONADHERANT PAD 3X8

## (undated) DEVICE — 1010 S-DRAPE TOWEL DRAPE 10/BX: Brand: STERI-DRAPE™

## (undated) DEVICE — Device

## (undated) DEVICE — MEDI-VAC NON-CONDUCTIVE SUCTION TUBING: Brand: CARDINAL HEALTH

## (undated) DEVICE — GAMMEX® PI HYBRID SIZE 7.5, STERILE POWDER-FREE SURGICAL GLOVE, POLYISOPRENE AND NEOPRENE BLEND: Brand: GAMMEX

## (undated) DEVICE — GAUZE TRAY STERILE 4X4 12PLY

## (undated) DEVICE — GOWN SURG AERO BLUE PERF XLG

## (undated) DEVICE — DRAIN INCS 7MM 20CMX7MM SIL

## (undated) DEVICE — SOLUTION SURG DURAPREP 26ML

## (undated) DEVICE — ABSORBABLE HEMOSTAT (OXIDIZED REGENERATED CELLULOSE, U.S.P.): Brand: SURGICEL

## (undated) DEVICE — SYRINGE,EAR/ULCER, 2 OZ, STERILE: Brand: MEDLINE

## (undated) DEVICE — PEN: MARKING STD PT 100/CS: Brand: MEDICAL ACTION INDUSTRIES

## (undated) DEVICE — SUT NUROLON 4-0 TF C584D

## (undated) DEVICE — SUCTION CANISTER, 3000CC,SAFELINER: Brand: DEROYAL

## (undated) DEVICE — PROXIMATE SKIN STAPLERS (35 WIDE) CONTAINS 35 STAINLESS STEEL STAPLES (FIXED HEAD): Brand: PROXIMATE

## (undated) DEVICE — SUT VICRYL 2-0 CT-2 J726D

## (undated) DEVICE — 3M(TM) MEDIPORE(TM) H SOFT CLOTH TAPE 2866: Brand: 3M™ MEDIPORE™

## (undated) DEVICE — CRANIOTOMY: Brand: MEDLINE INDUSTRIES, INC.

## (undated) DEVICE — EVACUATOR URO RELIVAC 100CC

## (undated) NOTE — LETTER
[place] [address]  Authorization for Invasive Procedures  Date: 4/6/24           Time: 1402    I hereby authorize Erica Moss MD, my physician and his/her assistants (if applicable), which may include medical students, residents, and/or fellows, to perform the following surgical operation/ procedure and administer such anesthesia as may be determined necessary by my physician: Tunneled Catheter Placement  on Ramón Aureliano  2.   I recognize that during the surgical operation/procedure, unforeseen conditions may necessitate additional or different procedures than those listed above.  I, therefore, further authorize and request that the above-named surgeon, assistants, or designees perform such procedures as are, in their judgment, necessary and desirable.    3.   My surgeon/physician has discussed prior to my surgery the potential benefits, risks and side effects of this procedure; the likelihood of achieving goals; and potential problems that might occur during recuperation.  They also discussed reasonable alternatives to the procedure, including risks, benefits, and side effects related to the alternatives and risks related to not receiving this procedure.  I have had all my questions answered and I acknowledge that no guarantee has been made as to the result that may be obtained.    4.   Should the need arise during my operation/procedure, which includes change of level of care prior to discharge, I also consent to the administration of blood and/or blood products.  Further, I understand that despite careful testing and screening of blood or blood products by collecting agencies, I may still be subject to ill effects as a result of receiving a blood transfusion and/or blood products.  The following are some, but not all, of the potential risks that can occur: fever and allergic reactions, hemolytic reactions, transmission of diseases such as Hepatitis, AIDS and Cytomegalovirus (CMV)  and fluid overload.  In the event that I wish to have an autologous transfusion of my own blood, or a directed donor transfusion, I will discuss this with my physician.   Check only if Refusing Blood or Blood Products  I understand refusal of blood or blood products as deemed necessary by my physician may have serious consequences to my condition to include possible death. I hereby assume responsibility for my refusal and release the hospital, its personnel, and my physicians from any responsibility for the consequences of my refusal.         o  Refuse         5.   I authorize the use of any specimen, organs, tissues, body parts or foreign objects that may be removed from my body during the operation/procedure for diagnosis, research or teaching purposes and their subsequent disposal by hospital authorities.  I also authorize the release of specimen test results and/or written reports to my treating physician on the hospital medical staff or other referring or consulting physicians involved in my care, at the discretion of the Pathologist or my treating physician.    6.   I consent to the photographing or videotaping of the operations or procedures to be performed, including appropriate portions of my body for medical, scientific, or educational purposes, provided my identity is not revealed by the pictures or by descriptive texts accompanying them.  If the procedure has been photographed/videotaped, the surgeon will obtain the original picture, image, videotape or CD.  The hospital will not be responsible for storage, release or maintenance of the picture, image, tape or CD.    7.   I consent to the presence of a  or observers in the operating room as deemed necessary by my physician or their designees.    8.   I recognize that in the event my procedure results in extended X-Ray/fluoroscopy time, I may develop a skin reaction.    9. If I have a Do Not Attempt Resuscitation (DNAR) order in place,  that status will be suspended while in the operating room, procedural suite, and during the recovery period unless otherwise explicitly stated by me (or a person authorized to consent on my behalf). The surgeon or my attending physician will determine when the applicable recovery period ends for purposes of reinstating the DNAR order.  10. Patients having a sterilization procedure: I understand that if the procedure is successful the results will be permanent and it will therefore be impossible for me to inseminate, conceive, or bear children.  I also understand that the procedure is intended to result in sterility, although the result has not been guaranteed.   11. I acknowledge that my physician has explained sedation/analgesia administration to me including the risk and benefits I consent to the administration of sedation/analgesia as may be necessary or desirable in the judgment of my physician.    I CERTIFY THAT I HAVE READ AND FULLY UNDERSTAND THE ABOVE CONSENT TO OPERATION and/or OTHER PROCEDURE.        ____________________________________       _________________________________      ______________________________  Signature of Patient         Signature of Responsible Person        Printed Name of Responsible Person        ____________________________________      _________________________________      ______________________________       Signature of Witness          Relationship to Patient                       Date                                       Time  Patient Name: Ramón Bedoya  : 3/21/1950    Reviewed: 2024   Printed: 2024  Medical Record #: [id number] Page 1 of 2             STATEMENT OF PHYSICIAN My signature below affirms that prior to the time of the procedure; I have explained to the patient and/or his/her legal representative, the risks and benefits involved in the proposed treatment and any reasonable alternative to the proposed treatment. I have also explained the risks and  benefits involved in refusal of the proposed treatment and alternatives to the proposed treatment and have answered the patient's questions. If I have a significant financial interest in a co-management agreement or a significant financial interest in any product or implant, or other significant relationship used in this procedure/surgery, I have disclosed this and had a discussion with my patient.     _______________________________________________________________ _____________________________  (Signature of Physician)                                                                                         (Date)                                   (Time)  Patient Name: Ramón Bedoya  : 3/21/1950    Reviewed: 2024   Printed: 2024  Medical Record #: [id number] Page 2 of 2

## (undated) NOTE — LETTER
[address] [place] [address], [place]  Authorization for Surgical Operation and Procedure                                                                                           I hereby authorize Dr Nithin Huerta, my physician and his/her assistants (if applicable), which may include medical students, residents, and/or fellows, to perform the following surgical operation/ procedure and administer such anesthesia as may be determined necessary by my physician: Bilateral lucrecia holes versus craniotomies for subdural hematoma evacuation  on [facility]   2. I recognize that during the surgical operation/procedure, unforeseen conditions may necessitate additional or different procedures than those listed above. I, therefore, further authorize and request that the above-named surgeon, assistants, or designees perform such procedures as are, in their judgment, necessary and desirable. 3.   My surgeon/physician has discussed prior to my surgery the potential benefits, risks and side effects of this procedure; the likelihood of achieving goals; and potential problems that might occur during recuperation. They also discussed reasonable alternatives to the procedure, including risks, benefits, and side effects related to the alternatives and risks related to not receiving this procedure. I have had all my questions answered and I acknowledge that no guarantee has been made as to the result that may be obtained. 4.   Should the need arise during my operation/procedure, which includes change of level of care prior to discharge, I also consent to the administration of blood and/or blood products. Further, I understand that despite careful testing and screening of blood or blood products by collecting agencies, I may still be subject to ill effects as a result of receiving a blood transfusion and/or blood products.   The following are some, but not all, of the potential risks that can occur: fever and allergic reactions, hemolytic reactions, transmission of diseases such as Hepatitis, AIDS and Cytomegalovirus (CMV) and fluid overload. In the event that I wish to have an autologous transfusion of my own blood, or a directed donor transfusion, I will discuss this with my physician. Check only if Refusing Blood or Blood Products  I understand refusal of blood or blood products as deemed necessary by my physician may have serious consequences to my condition to include possible death. I hereby assume responsibility for my refusal and release the hospital, its personnel, and my physicians from any responsibility for the consequences of my refusal.    o  Refuse   5. I authorize the use of any specimen, organs, tissues, body parts or foreign objects that may be removed from my body during the operation/procedure for diagnosis, research or teaching purposes and their subsequent disposal by hospital authorities. I also authorize the release of specimen test results and/or written reports to my treating physician on the hospital medical staff or other referring or consulting physicians involved in my care, at the discretion of the Pathologist or my treating physician. 6.   I consent to the photographing or videotaping of the operations or procedures to be performed, including appropriate portions of my body for medical, scientific, or educational purposes, provided my identity is not revealed by the pictures or by descriptive texts accompanying them. If the procedure has been photographed/videotaped, the surgeon will obtain the original picture, image, videotape or CD. The hospital will not be responsible for storage, release or maintenance of the picture, image, tape or CD.    7.   I consent to the presence of a  or observers in the operating room as deemed necessary by my physician or their designees.     8.   I recognize that in the event my procedure results in extended X-Ray/fluoroscopy time, I may develop a skin reaction. 9. If I have a Do Not Attempt Resuscitation (DNAR) order in place, that status will be suspended while in the operating room, procedural suite, and during the recovery period unless otherwise explicitly stated by me (or a person authorized to consent on my behalf). The surgeon or my attending physician will determine when the applicable recovery period ends for purposes of reinstating the DNAR order. 10. Patients having a sterilization procedure: I understand that if the procedure is successful the results will be permanent and it will therefore be impossible for me to inseminate, conceive, or bear children. I also understand that the procedure is intended to result in sterility, although the result has not been guaranteed. 11. I acknowledge that my physician has explained sedation/analgesia administration to me including the risk and benefits I consent to the administration of sedation/analgesia as may be necessary or desirable in the judgment of my physician. I CERTIFY THAT I HAVE READ AND FULLY UNDERSTAND THE ABOVE CONSENT TO OPERATION and/or OTHER PROCEDURE.     _________________________________________ _________________________________     ___________________________________  Signature of Patient     Signature of Responsible Person                   Printed Name of Responsible Person                              _________________________________________ ______________________________        ___________________________________  Signature of Witness         Date  Time         Relationship to Patient    STATEMENT OF PHYSICIAN My signature below affirms that prior to the time of the procedure; I have explained to the patient and/or his/her legal representative, the risks and benefits involved in the proposed treatment and any reasonable alternative to the proposed treatment.  I have also explained the risks and benefits involved in refusal of the proposed treatment and alternatives to the proposed treatment and have answered the patient's questions.  If I have a significant financial interest in a co-management agreement or a significant financial interest in any product or implant, or other significant relationship used in this procedure/surgery, I have disclosed this and had a discussion with my patient.     _______________________________________________________________ _____________________________  Dottie Pastel of Physician)                                                                                         (Date)                                   (Time)  Patient Name: Benito Corrales    : 3/21/1950   Printed: 2023      Medical Record #: [id number]                                              Page 1 of 1

## (undated) NOTE — LETTER
[facility]SW/SE  [address]  [facility] [address]  [phone]    Blood Transfusion Consent    In the course of your treatment, it may become necessary to administer a transfusion of blood or blood components. This form provides basic information concerning this procedure and, if signed by you, authorizes its administration. By signing this form, you agree that all of your questions about the administration of blood or blood products have been answered by the ordering medical professional or designee.    Description of Procedure  Blood is introduced into one of your veins, commonly in the arm, using a sterilized disposable needle. The amount of blood transfused, and whether the transfusion will be of blood or blood components is a judgement the physician will make based on your particular needs.    Risks  The transfusion is a common procedure of low risk.  MINOR AND TEMPORARY REACTIONS ARE NOT UNCOMMON, including a slight bruise, swelling or local reaction in the area where the needle pierces your skin, or a nonserious reaction to the transfused material itself, including headache, fever or mild skin reaction, such as rash.  Serious reactions are possible, though very unlikely, and include severe allergic reaction (shock) and destruction (hemolysis) of transfused blood cells.  Infectious diseases which are known to be transmitted by blood transfusion include certain types of viral Hepatitis(liver infection from a virus), Human Immunodeficiency Virus (HIV-1,2) infection, a viral infection known to cause Acquired Immunodeficiency Syndrome (AIDS), as well as certain other bacterial, viral, and parasitic diseases. While a minimal risk of acquiring an infectious disease from transfused blood exists, in accordance with the Federal and State law, all due care has been taken in donor selection and testing to avoid transmission of disease.    Alternatives  If loss of blood poses serious threats during your  treatment, THERE IS NO EFFECTIVE ALTERNATIVE TO BLOOD TRANSFUSION. However, if you have any further questions on this matter, your provider will fully explain the alternatives to you if it has not already been done.    I, ______________________________, have read/had read to me the above. I understand the matters bearing on the decision whether or not to authorize a transfusion of blood or blood components. I have no questions which have not been answered to my full satisfaction. I hereby consent to such transfusion as my physician may deem necessary or advisable in the course of my treatment.    ______________________________________________                    ___________________________  (Signature of Patient or Responsible party in case of minor,                 (Printed Name of Patient or incompetent, or unconscious patient)              Responsible Party)    ___________________________               _____________________  (Relationship to Patient if not self)                                    (Date and Time)    __________________________                                                           ______________________              (Signature of Witness)               (Printed Name of Witness)     Language line ()    Telephone/Verbal/Video Consent    __________________________                     ____________________  (Signature of 2nd Witness           (Printed Name of 2nd  Telephone/Verbal/Video Consent)           Witness)    Patient Name: Ramón Bedoya     : 3/21/1950                 Printed: April 3, 2024     Medical Record #: [id number]      Rev: 2023